# Patient Record
Sex: MALE | Race: WHITE | NOT HISPANIC OR LATINO | Employment: FULL TIME | URBAN - METROPOLITAN AREA
[De-identification: names, ages, dates, MRNs, and addresses within clinical notes are randomized per-mention and may not be internally consistent; named-entity substitution may affect disease eponyms.]

---

## 2018-04-05 ENCOUNTER — OFFICE VISIT (OUTPATIENT)
Dept: PRIMARY CARE CLINIC | Facility: CLINIC | Age: 38
End: 2018-04-05
Payer: COMMERCIAL

## 2018-04-05 VITALS
RESPIRATION RATE: 12 BRPM | HEIGHT: 72 IN | OXYGEN SATURATION: 100 % | DIASTOLIC BLOOD PRESSURE: 65 MMHG | HEART RATE: 80 BPM | WEIGHT: 172.75 LBS | TEMPERATURE: 98 F | SYSTOLIC BLOOD PRESSURE: 113 MMHG | BODY MASS INDEX: 23.4 KG/M2

## 2018-04-05 DIAGNOSIS — M25.50 ARTHRALGIA OF MULTIPLE JOINTS: ICD-10-CM

## 2018-04-05 DIAGNOSIS — Z00.00 PHYSICAL EXAM, ANNUAL: Primary | ICD-10-CM

## 2018-04-05 PROCEDURE — 99999 PR PBB SHADOW E&M-NEW PATIENT-LVL III: CPT | Mod: PBBFAC,,, | Performed by: NURSE PRACTITIONER

## 2018-04-05 PROCEDURE — 99385 PREV VISIT NEW AGE 18-39: CPT | Mod: SA,S$GLB,, | Performed by: NURSE PRACTITIONER

## 2018-04-05 RX ORDER — MELOXICAM 7.5 MG/1
TABLET ORAL
Qty: 60 TABLET | Refills: 2 | Status: SHIPPED | OUTPATIENT
Start: 2018-04-05 | End: 2019-09-18

## 2018-04-05 RX ORDER — MELOXICAM 15 MG/1
15 TABLET ORAL 2 TIMES DAILY
COMMUNITY
End: 2018-04-05

## 2018-04-05 NOTE — PROGRESS NOTES
Subjective:       Patient ID: Storm Hinds is a 38 y.o. male.    Chief Complaint: Establish Care    Patient presents to clinic today requesting to re-establish as a primary care patient.  He is well known to this provider and was previously followed as a primary care patient.  He has a known history of intermittent joint pain (knees, elbows, wrists and ankles after going up stairs/ladders multiple times at work)  and has been using Mobic on an as needed basis.  He is an  and works out of town in [place] but lives in [place] with his wife and children. He has no acute complaints today.     Diet/Exercise: He does not follow any specific diet or exercise plan  Eye: due for exam. Last one was 1 year ago.   Dental: He is followed by Dr. Aleena Tan for his dental care and does attend routine exams.    Immunizations: Up to date  Labs/Annual Exam: Due for labs and exam will be completed today.           Review of Systems   Constitutional: Positive for fatigue (experiences intermittent fatigue and daytime sleepiness when working off hour shifts. ). Negative for appetite change, chills, diaphoresis, fever and unexpected weight change.   HENT: Negative.  Negative for congestion.    Eyes: Negative.    Respiratory: Negative.    Cardiovascular: Negative.    Gastrointestinal: Negative.    Endocrine: Negative.    Genitourinary: Negative.    Musculoskeletal: Positive for arthralgias. Negative for back pain, gait problem, joint swelling, myalgias and neck pain.   Skin: Negative.    Allergic/Immunologic: Negative.    Neurological: Negative.    Hematological: Negative.    Psychiatric/Behavioral: Negative.    All other systems reviewed and are negative.      Objective:      Physical Exam   Constitutional: He is oriented to person, place, and time. He appears well-developed and well-nourished. No distress.   HENT:   Head: Normocephalic and atraumatic.   Right Ear: External ear normal.   Left Ear: External ear normal.   Nose:  Nose normal.   Mouth/Throat: Oropharynx is clear and moist. No oropharyngeal exudate.   Eyes: Conjunctivae and EOM are normal. Pupils are equal, round, and reactive to light. Right eye exhibits no discharge. Left eye exhibits no discharge. No scleral icterus.   Neck: Normal range of motion. Neck supple. No tracheal deviation present. No thyromegaly present.   Cardiovascular: Normal rate, regular rhythm, normal heart sounds and intact distal pulses.  Exam reveals no gallop and no friction rub.    No murmur heard.  Pulmonary/Chest: Effort normal and breath sounds normal. No respiratory distress.   Abdominal: Soft. Bowel sounds are normal. He exhibits no distension. There is no tenderness.   Musculoskeletal: Normal range of motion. He exhibits no edema, tenderness or deformity.   Lymphadenopathy:     He has no cervical adenopathy.   Neurological: He is alert and oriented to person, place, and time. He displays normal reflexes. No cranial nerve deficit or sensory deficit. Coordination normal.   Skin: Skin is warm and dry. Capillary refill takes less than 2 seconds. No rash noted. He is not diaphoretic. No erythema. No pallor.   Psychiatric: He has a normal mood and affect. His behavior is normal. Judgment and thought content normal.   Nursing note and vitals reviewed.      Assessment:       1. Physical exam, annual    2. Arthralgia of multiple joints        Plan:       Physical exam, annual  -     Lipid panel; Future; Expected date: 04/05/2018  -     CBC auto differential; Future; Expected date: 04/05/2018  -     Comprehensive metabolic panel; Future; Expected date: 04/05/2018  -     URINALYSIS; Future; Expected date: 04/05/201    Overall doing well.  Will undergo labs within the next week.  We will call with any acutely abnormal results.   Arthralgia of multiple joints  -     meloxicam (MOBIC) 7.5 MG tablet; Take 1 or 2 tablets daily by mouth as needed for joint pain.  Dispense: 60 tablet; Refill: 2  -      Sedimentation rate, manual; Future; Expected date: 04/05/2018  -     Rheumatoid factor; Future; Expected date: 04/05/2018  Will refill rx for as needed use.  Cautioned patient on risks associated with NSAID use including CV and renal risks and instructed patient to not take any other NSAID while taking Mobic.  He was encouraged to rotate medication such as acetaminophen arthritis in lieu of daily Mobic.  Will check SED rate and RF with labs.  Take RX with food to avoid GI upset/risk for bleeding.      I have reviewed the patient's past medical/surgical and social histories and updated as appropriate. Medications were reviewed and discussed as appropriate including side effects and risks versus benefit.     Plan of care was reviewed and agreed upon with the patient.  An opportunity to ask questions was provided and explanation given. Patient verbalized understanding on all information reviewed and discussed.  The patient will follow up 6 months or sooner. If symptoms worsen patient may call for ASAP appointment or report to the emergency department for further evaluation.

## 2018-04-05 NOTE — PATIENT INSTRUCTIONS
Erythrocyte Sedimentation Rate  Does this test have other names?  ESR, sed rate  What is this test?  Erythrocyte sedimentation rate (ESR) is a blood test. It measures how quickly erythrocytes, or red blood cells, separate from a blood sample that has been treated so the blood will not clot. During this test, a small amount of your blood will be put in an upright tube. A  will measure the rate that your red blood cells settle toward the bottom of the tube after 1 hour.  If you have a condition that causes inflammation or cell damage, your red blood cells tend to clump together. This makes them heavier, so they settle faster. The faster your red blood cells settle and fall, the higher your ESR. A high ESR tells your healthcare provider that you may have an active disease process in your body.   Why do I need this test?  You may need this blood test if you have symptoms of one of the diseases that may cause ESR to go up.  You may also need this test if you have already been diagnosed with a disease that causes a high ESR. The test can allow your healthcare provider to see how well you are responding to treatment.  The ESR blood test is most useful for diagnosing or monitoring diseases that cause pain and swelling from inflammation. Other symptoms may include fever and weight loss. These diseases include:  · Temporal arteritis  · Rheumatoid arthritis  · Polymyalgia rheumatica  ESR is not used as a screening test in people who do not have symptoms or to diagnose disease because many conditions can cause it to increase. It might also go up in many normal cases. ESR doesn't tell your healthcare provider whether you have a specific disease. It only suggests that you may have an active disease process in your body.   What other tests might I have along with this test?  You may have other tests if your healthcare provider is doing this test to diagnose a disease.  Your provider may do an ESR alone if he or she  is monitoring a disease you already have.  Because ESR tells your provider only what is happening right now, you may need to have the test repeated over time.   What do my test results mean?  A result for a lab test may be affected by many things, including the method the laboratory uses to do the test. If your test results are different from the normal value, you may not have a problem. To learn what the results mean for you, talk with your healthcare provider.  ESR is measured in millimeters per hour (mm/h). The normal values are:  · 0 to 10 mm/h in children  · 0 to 15 mm/h in men younger than 50  · 0 to 20 mm/h in men older than 50  · 0 to 20 mm/h in women younger than 50  · 0 to 30 mm/h in women older than 50  ESR above 100 mm/h is most likely caused by an active disease. For instance, you may have:  · A disease that causes inflammation in your body  · Active infection  · Cancer  · Heart disease  · Kidney disease  · Blood disease  · Diabetes  · Collagen vascular disease  How is this test done?  The test requires a blood sample, which is drawn through a needle from a vein in your arm.  Does this test pose any risks?  Taking a blood sample with a needle carries risks that include bleeding, infection, bruising, or feeling dizzy. When the needle pricks your arm, you may feel a slight stinging sensation or pain. Afterward, the site may be slightly sore.   What might affect my test results?  Many things that are not active diseases can increase your ESR. These include:  · Pregnancy  · Old age  · Being female  · Having a menstrual period  · Having recently eaten a fatty meal  · Being obese  · Taking certain medicines  How do I get ready for this test?  You don't need to prepare for this test. Be sure your healthcare provider knows about all medicines, herbs, vitamins, and supplements you are taking. This includes medicines that don't need a prescription and any illicit drugs you may use. Tell your provider if you ate a  fatty meal recently, if you are having your period, or if you may be pregnant.   © 9680-9239 [employer]. [address] [place] [address], [place] [address]. All rights reserved. This information is not intended as a substitute for professional medical care. Always follow your healthcare professional's instructions.

## 2018-04-07 ENCOUNTER — LAB VISIT (OUTPATIENT)
Dept: LAB | Facility: HOSPITAL | Age: 38
End: 2018-04-07
Attending: NURSE PRACTITIONER
Payer: COMMERCIAL

## 2018-04-07 DIAGNOSIS — Z00.00 PHYSICAL EXAM, ANNUAL: ICD-10-CM

## 2018-04-07 DIAGNOSIS — M25.50 ARTHRALGIA OF MULTIPLE JOINTS: ICD-10-CM

## 2018-04-07 LAB
ALBUMIN SERPL BCP-MCNC: 4.4 G/DL
ALP SERPL-CCNC: 55 U/L
ALT SERPL W/O P-5'-P-CCNC: 71 U/L
ANION GAP SERPL CALC-SCNC: 7 MMOL/L
AST SERPL-CCNC: 43 U/L
BASOPHILS # BLD AUTO: 0.02 K/UL
BASOPHILS NFR BLD: 0.3 %
BILIRUB SERPL-MCNC: 0.7 MG/DL
BUN SERPL-MCNC: 17 MG/DL
CALCIUM SERPL-MCNC: 9.7 MG/DL
CHLORIDE SERPL-SCNC: 103 MMOL/L
CHOLEST SERPL-MCNC: 247 MG/DL
CHOLEST/HDLC SERPL: 4 {RATIO}
CO2 SERPL-SCNC: 29 MMOL/L
CREAT SERPL-MCNC: 0.7 MG/DL
DIFFERENTIAL METHOD: ABNORMAL
EOSINOPHIL # BLD AUTO: 0.3 K/UL
EOSINOPHIL NFR BLD: 4.2 %
ERYTHROCYTE [DISTWIDTH] IN BLOOD BY AUTOMATED COUNT: 13.3 %
ERYTHROCYTE [SEDIMENTATION RATE] IN BLOOD BY WESTERGREN METHOD: 1 MM/HR
EST. GFR  (AFRICAN AMERICAN): >60 ML/MIN/1.73 M^2
EST. GFR  (NON AFRICAN AMERICAN): >60 ML/MIN/1.73 M^2
GLUCOSE SERPL-MCNC: 86 MG/DL
HCT VFR BLD AUTO: 40.8 %
HDLC SERPL-MCNC: 61 MG/DL
HDLC SERPL: 24.7 %
HGB BLD-MCNC: 13.7 G/DL
IMM GRANULOCYTES # BLD AUTO: 0.03 K/UL
IMM GRANULOCYTES NFR BLD AUTO: 0.5 %
LDLC SERPL CALC-MCNC: 152.4 MG/DL
LYMPHOCYTES # BLD AUTO: 2.4 K/UL
LYMPHOCYTES NFR BLD: 35.6 %
MCH RBC QN AUTO: 29.7 PG
MCHC RBC AUTO-ENTMCNC: 33.6 G/DL
MCV RBC AUTO: 88 FL
MONOCYTES # BLD AUTO: 0.6 K/UL
MONOCYTES NFR BLD: 8.6 %
NEUTROPHILS # BLD AUTO: 3.4 K/UL
NEUTROPHILS NFR BLD: 50.8 %
NONHDLC SERPL-MCNC: 186 MG/DL
NRBC BLD-RTO: 0 /100 WBC
PLATELET # BLD AUTO: 268 K/UL
PMV BLD AUTO: 9.7 FL
POTASSIUM SERPL-SCNC: 4.1 MMOL/L
PROT SERPL-MCNC: 7.5 G/DL
RBC # BLD AUTO: 4.62 M/UL
RHEUMATOID FACT SERPL-ACNC: <10 IU/ML
SODIUM SERPL-SCNC: 139 MMOL/L
TRIGL SERPL-MCNC: 168 MG/DL
WBC # BLD AUTO: 6.61 K/UL

## 2018-04-07 PROCEDURE — 86431 RHEUMATOID FACTOR QUANT: CPT

## 2018-04-07 PROCEDURE — 80061 LIPID PANEL: CPT

## 2018-04-07 PROCEDURE — 85651 RBC SED RATE NONAUTOMATED: CPT

## 2018-04-07 PROCEDURE — 36415 COLL VENOUS BLD VENIPUNCTURE: CPT

## 2018-04-07 PROCEDURE — 80053 COMPREHEN METABOLIC PANEL: CPT

## 2018-04-07 PROCEDURE — 85025 COMPLETE CBC W/AUTO DIFF WBC: CPT

## 2018-04-09 ENCOUNTER — PATIENT MESSAGE (OUTPATIENT)
Dept: PRIMARY CARE CLINIC | Facility: CLINIC | Age: 38
End: 2018-04-09

## 2018-04-09 DIAGNOSIS — E78.2 ELEVATED CHOLESTEROL WITH ELEVATED TRIGLYCERIDES: ICD-10-CM

## 2018-04-09 DIAGNOSIS — M25.50 ARTHRALGIA OF MULTIPLE JOINTS: Primary | ICD-10-CM

## 2018-04-09 DIAGNOSIS — R74.8 ELEVATED LIVER ENZYMES: ICD-10-CM

## 2018-04-09 RX ORDER — DICLOFENAC SODIUM 10 MG/G
2 GEL TOPICAL 3 TIMES DAILY PRN
Qty: 100 G | Refills: 1 | Status: SHIPPED | OUTPATIENT
Start: 2018-04-09 | End: 2018-04-19

## 2019-09-18 ENCOUNTER — HOSPITAL ENCOUNTER (OUTPATIENT)
Dept: RADIOLOGY | Facility: HOSPITAL | Age: 39
Discharge: HOME | End: 2019-09-18
Attending: FAMILY MEDICINE
Payer: COMMERCIAL

## 2019-09-18 ENCOUNTER — OFFICE VISIT (OUTPATIENT)
Dept: FAMILY MEDICINE | Facility: CLINIC | Age: 39
End: 2019-09-18
Payer: COMMERCIAL

## 2019-09-18 ENCOUNTER — HOSPITAL ENCOUNTER (OUTPATIENT)
Dept: CARDIOLOGY | Facility: HOSPITAL | Age: 39
Discharge: HOME | End: 2019-09-18
Attending: FAMILY MEDICINE
Payer: COMMERCIAL

## 2019-09-18 VITALS
OXYGEN SATURATION: 97 % | HEART RATE: 68 BPM | TEMPERATURE: 98 F | HEIGHT: 72 IN | RESPIRATION RATE: 16 BRPM | BODY MASS INDEX: 26.79 KG/M2 | DIASTOLIC BLOOD PRESSURE: 68 MMHG | WEIGHT: 197.81 LBS | SYSTOLIC BLOOD PRESSURE: 112 MMHG

## 2019-09-18 DIAGNOSIS — M54.50 ACUTE MIDLINE LOW BACK PAIN WITHOUT SCIATICA: ICD-10-CM

## 2019-09-18 DIAGNOSIS — R07.89 CHEST PRESSURE: ICD-10-CM

## 2019-09-18 DIAGNOSIS — Z76.89 ENCOUNTER TO ESTABLISH CARE: Primary | ICD-10-CM

## 2019-09-18 DIAGNOSIS — M62.838 MUSCLE SPASM: ICD-10-CM

## 2019-09-18 DIAGNOSIS — M54.6 ACUTE MIDLINE THORACIC BACK PAIN: ICD-10-CM

## 2019-09-18 DIAGNOSIS — M54.2 NECK PAIN: ICD-10-CM

## 2019-09-18 DIAGNOSIS — K21.9 GASTROESOPHAGEAL REFLUX DISEASE, ESOPHAGITIS PRESENCE NOT SPECIFIED: ICD-10-CM

## 2019-09-18 PROCEDURE — 72100 XR LUMBAR SPINE AP AND LATERAL: ICD-10-PCS | Mod: 26,,, | Performed by: RADIOLOGY

## 2019-09-18 PROCEDURE — 93005 ELECTROCARDIOGRAM TRACING: CPT

## 2019-09-18 PROCEDURE — 3008F BODY MASS INDEX DOCD: CPT | Mod: CPTII,S$GLB,, | Performed by: FAMILY MEDICINE

## 2019-09-18 PROCEDURE — 72100 X-RAY EXAM L-S SPINE 2/3 VWS: CPT | Mod: TC,FY

## 2019-09-18 PROCEDURE — 72040 XR CERVICAL SPINE AP LATERAL: ICD-10-PCS | Mod: 26,,, | Performed by: RADIOLOGY

## 2019-09-18 PROCEDURE — 72040 X-RAY EXAM NECK SPINE 2-3 VW: CPT | Mod: TC,FY

## 2019-09-18 PROCEDURE — 72070 X-RAY EXAM THORAC SPINE 2VWS: CPT | Mod: 26,,, | Performed by: RADIOLOGY

## 2019-09-18 PROCEDURE — 3008F PR BODY MASS INDEX (BMI) DOCUMENTED: ICD-10-PCS | Mod: CPTII,S$GLB,, | Performed by: FAMILY MEDICINE

## 2019-09-18 PROCEDURE — 99214 OFFICE O/P EST MOD 30 MIN: CPT | Mod: S$GLB,,, | Performed by: FAMILY MEDICINE

## 2019-09-18 PROCEDURE — 72070 X-RAY EXAM THORAC SPINE 2VWS: CPT | Mod: TC,FY

## 2019-09-18 PROCEDURE — 99214 PR OFFICE/OUTPT VISIT, EST, LEVL IV, 30-39 MIN: ICD-10-PCS | Mod: S$GLB,,, | Performed by: FAMILY MEDICINE

## 2019-09-18 PROCEDURE — 72040 X-RAY EXAM NECK SPINE 2-3 VW: CPT | Mod: 26,,, | Performed by: RADIOLOGY

## 2019-09-18 PROCEDURE — 72100 X-RAY EXAM L-S SPINE 2/3 VWS: CPT | Mod: 26,,, | Performed by: RADIOLOGY

## 2019-09-18 PROCEDURE — 72070 XR THORACIC SPINE AP LATERAL: ICD-10-PCS | Mod: 26,,, | Performed by: RADIOLOGY

## 2019-09-18 RX ORDER — DIAZEPAM 5 MG/1
5 TABLET ORAL DAILY PRN
Qty: 5 TABLET | Refills: 0 | Status: SHIPPED | OUTPATIENT
Start: 2019-09-18 | End: 2019-09-26 | Stop reason: SDUPTHER

## 2019-09-18 RX ORDER — METHOCARBAMOL 750 MG/1
TABLET, FILM COATED ORAL
COMMUNITY
Start: 2019-09-17 | End: 2019-09-18 | Stop reason: ALTCHOICE

## 2019-09-18 RX ORDER — HYDROCODONE BITARTRATE AND ACETAMINOPHEN 7.5; 325 MG/1; MG/1
TABLET ORAL
COMMUNITY
Start: 2019-09-17 | End: 2019-09-18 | Stop reason: ALTCHOICE

## 2019-09-18 RX ORDER — CYCLOBENZAPRINE HCL 10 MG
10 TABLET ORAL 2 TIMES DAILY PRN
Qty: 20 TABLET | Refills: 0 | Status: SHIPPED | OUTPATIENT
Start: 2019-09-18 | End: 2019-09-28
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
## 2019-09-18 NOTE — LETTER
September 18, 2019      [facility]  [address] [place] [id number]  Phone: [phone]  Fax: [phone]       Patient: Storm Hinds   YOB: 1980  Date of Visit: 09/18/2019    To Whom It May Concern:    Storm Hinds  was at [facility] on 09/18/2019. He may return to work/school on 9/23/19 with no restrictions. If you have any questions or concerns, or if I can be of further assistance, please do not hesitate to contact me.    Sincerely,    Katie Wasserman MD

## 2019-09-19 ENCOUNTER — TELEPHONE (OUTPATIENT)
Dept: FAMILY MEDICINE | Facility: CLINIC | Age: 39
End: 2019-09-19

## 2019-09-19 NOTE — TELEPHONE ENCOUNTER
----- Message from Jethro Stark sent at 9/19/2019  8:37 AM CDT -----  Contact: Sarah Griffith [phone]  Type: Needs Medical Advice    Who Called: Sarah Griffith [phone]    Symptoms (please be specific):  Still has pain.    How long has patient had these symptoms:  Since last visit    Medication:cyclobenzaprine (FLEXERIL) 10 MG tablet 20 tablet 0 9/18/2019 9/28/2019   Sig - Route: Take 1 tablet (10 mg total) by mouth 2 (two) times daily as needed for Muscle spasms. - Oral   Sent to pharmacy as: cyclobenzaprine (FLEXERIL) 10 MG tablet     Pharmacy name and phone #:      [address] [address] [place] [id number] [address]  Phone: [phone] Fax: [phone]    Best Call Back Number: Sarah Griffith [phone]    Additional Information: Advised still has pain taking this medication. Would like to know what else he could do for his condition regarding his pain. Please call.

## 2019-09-19 NOTE — TELEPHONE ENCOUNTER
CALL PT. THIS AFTERNOON NO ANSWER. WILL TRY LATER PT WOULD LIKE SOMETHING ELSE FOR PAIN THAN WHAT HE IS TAKING. PLEASE ADVISE.    THANKS.

## 2019-09-20 ENCOUNTER — TELEPHONE (OUTPATIENT)
Dept: FAMILY MEDICINE | Facility: CLINIC | Age: 39
End: 2019-09-20

## 2019-09-20 NOTE — TELEPHONE ENCOUNTER
----- Message from Aleena Espinosa sent at 9/20/2019  9:51 AM CDT -----  Contact: pt's wife  [phone]  Patient's wife called back to the office she just missed a call from the nurse will you be able to call back before 12:15 then after 2:15 today.

## 2019-09-20 NOTE — TELEPHONE ENCOUNTER
PT. WANT TO KNOW IF HE COULD TAKE THE VALIUM AND FLEXERIL TOGETHER. VALIUM ORDERED FOR BREAK THROUGH PAIN AS PRESCRIBED. WIFE STATES SHE WILL LET  KNOW AND SHE IS OK WITH THE ORDER.+

## 2019-09-23 ENCOUNTER — OFFICE VISIT (OUTPATIENT)
Dept: FAMILY MEDICINE | Facility: CLINIC | Age: 39
End: 2019-09-23
Payer: COMMERCIAL

## 2019-09-23 ENCOUNTER — HOSPITAL ENCOUNTER (OUTPATIENT)
Dept: RADIOLOGY | Facility: HOSPITAL | Age: 39
Discharge: HOME | End: 2019-09-23
Attending: FAMILY MEDICINE
Payer: COMMERCIAL

## 2019-09-23 ENCOUNTER — PATIENT MESSAGE (OUTPATIENT)
Dept: FAMILY MEDICINE | Facility: CLINIC | Age: 39
End: 2019-09-23

## 2019-09-23 ENCOUNTER — TELEPHONE (OUTPATIENT)
Dept: FAMILY MEDICINE | Facility: CLINIC | Age: 39
End: 2019-09-23

## 2019-09-23 VITALS
BODY MASS INDEX: 26.95 KG/M2 | RESPIRATION RATE: 18 BRPM | DIASTOLIC BLOOD PRESSURE: 83 MMHG | SYSTOLIC BLOOD PRESSURE: 130 MMHG | TEMPERATURE: 98 F | OXYGEN SATURATION: 96 % | HEIGHT: 72 IN | WEIGHT: 199 LBS | HEART RATE: 110 BPM

## 2019-09-23 DIAGNOSIS — R93.7 ABNORMAL X-RAY OF LUMBAR SPINE: ICD-10-CM

## 2019-09-23 DIAGNOSIS — R29.898 RIGHT LEG WEAKNESS: ICD-10-CM

## 2019-09-23 DIAGNOSIS — R20.0 RIGHT LEG NUMBNESS: ICD-10-CM

## 2019-09-23 DIAGNOSIS — M54.2 NECK PAIN: ICD-10-CM

## 2019-09-23 DIAGNOSIS — R29.898 RIGHT LEG WEAKNESS: Primary | ICD-10-CM

## 2019-09-23 DIAGNOSIS — R20.0 LEFT ARM NUMBNESS: ICD-10-CM

## 2019-09-23 DIAGNOSIS — M48.00 NEURAL FORAMINAL STENOSIS, MULTILEVEL: Primary | ICD-10-CM

## 2019-09-23 DIAGNOSIS — M54.41 ACUTE MIDLINE LOW BACK PAIN WITH RIGHT-SIDED SCIATICA: ICD-10-CM

## 2019-09-23 PROCEDURE — 72148 MRI LUMBAR SPINE WITHOUT CONTRAST: ICD-10-PCS | Mod: 26,,, | Performed by: RADIOLOGY

## 2019-09-23 PROCEDURE — 72148 MRI LUMBAR SPINE W/O DYE: CPT | Mod: 26,,, | Performed by: RADIOLOGY

## 2019-09-23 PROCEDURE — 96372 THER/PROPH/DIAG INJ SC/IM: CPT | Mod: S$GLB,,, | Performed by: FAMILY MEDICINE

## 2019-09-23 PROCEDURE — 99214 OFFICE O/P EST MOD 30 MIN: CPT | Mod: 25,S$GLB,, | Performed by: FAMILY MEDICINE

## 2019-09-23 PROCEDURE — 96372 PR INJECTION,THERAP/PROPH/DIAG2ST, IM OR SUBCUT: ICD-10-PCS | Mod: S$GLB,,, | Performed by: FAMILY MEDICINE

## 2019-09-23 PROCEDURE — 99214 PR OFFICE/OUTPT VISIT, EST, LEVL IV, 30-39 MIN: ICD-10-PCS | Mod: 25,S$GLB,, | Performed by: FAMILY MEDICINE

## 2019-09-23 PROCEDURE — 72141 MRI CERVICAL SPINE WITHOUT CONTRAST: ICD-10-PCS | Mod: 26,,, | Performed by: RADIOLOGY

## 2019-09-23 PROCEDURE — 3008F PR BODY MASS INDEX (BMI) DOCUMENTED: ICD-10-PCS | Mod: CPTII,S$GLB,, | Performed by: FAMILY MEDICINE

## 2019-09-23 PROCEDURE — 72148 MRI LUMBAR SPINE W/O DYE: CPT | Mod: TC

## 2019-09-23 PROCEDURE — 72141 MRI NECK SPINE W/O DYE: CPT | Mod: TC

## 2019-09-23 PROCEDURE — 3008F BODY MASS INDEX DOCD: CPT | Mod: CPTII,S$GLB,, | Performed by: FAMILY MEDICINE

## 2019-09-23 PROCEDURE — 72141 MRI NECK SPINE W/O DYE: CPT | Mod: 26,,, | Performed by: RADIOLOGY

## 2019-09-23 RX ORDER — GABAPENTIN 300 MG/1
300 CAPSULE ORAL NIGHTLY
Qty: 30 CAPSULE | Refills: 0 | Status: SHIPPED | OUTPATIENT
Start: 2019-09-23 | End: 2019-10-27 | Stop reason: SDUPTHER

## 2019-09-23 RX ORDER — NAPROXEN 500 MG/1
500 TABLET ORAL 2 TIMES DAILY WITH MEALS
Qty: 60 TABLET | Refills: 0 | Status: SHIPPED | OUTPATIENT
Start: 2019-09-23 | End: 2019-09-30

## 2019-09-23 RX ORDER — DEXAMETHASONE SODIUM PHOSPHATE 4 MG/ML
4 INJECTION, SOLUTION INTRA-ARTICULAR; INTRALESIONAL; INTRAMUSCULAR; INTRAVENOUS; SOFT TISSUE
Status: COMPLETED | OUTPATIENT
Start: 2019-09-23 | End: 2019-09-23

## 2019-09-23 RX ORDER — PREDNISONE 20 MG/1
20 TABLET ORAL DAILY
Qty: 5 TABLET | Refills: 0 | Status: SHIPPED | OUTPATIENT
Start: 2019-09-23 | End: 2019-09-28

## 2019-09-23 RX ADMIN — DEXAMETHASONE SODIUM PHOSPHATE 4 MG: 4 INJECTION, SOLUTION INTRA-ARTICULAR; INTRALESIONAL; INTRAMUSCULAR; INTRAVENOUS; SOFT TISSUE at 10:09
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
## 2019-09-24 ENCOUNTER — PATIENT MESSAGE (OUTPATIENT)
Dept: FAMILY MEDICINE | Facility: CLINIC | Age: 39
End: 2019-09-24

## 2019-09-26 ENCOUNTER — PATIENT MESSAGE (OUTPATIENT)
Dept: FAMILY MEDICINE | Facility: CLINIC | Age: 39
End: 2019-09-26

## 2019-09-26 ENCOUNTER — PATIENT MESSAGE (OUTPATIENT)
Dept: CARDIOLOGY | Facility: CLINIC | Age: 39
End: 2019-09-26

## 2019-09-26 DIAGNOSIS — M62.838 MUSCLE SPASM: ICD-10-CM

## 2019-09-26 RX ORDER — DIAZEPAM 5 MG/1
5 TABLET ORAL DAILY PRN
Qty: 5 TABLET | Refills: 0 | Status: SHIPPED | OUTPATIENT
Start: 2019-09-26 | End: 2019-10-21

## 2019-09-26 NOTE — TELEPHONE ENCOUNTER
This is the patient that wants short term paperwork. Let me know if you need me to do anything else. Thank you

## 2019-09-26 NOTE — TELEPHONE ENCOUNTER
Patient stated the medication was not sent to pharmacy. I looked and the medication is there but no confirmation noted pramod tmed  was sent. Please advise. Thank you

## 2019-09-27 RX ORDER — DIAZEPAM 5 MG/1
TABLET ORAL
Qty: 5 TABLET | Refills: 0 | OUTPATIENT
Start: 2019-09-27

## 2019-10-02 ENCOUNTER — OFFICE VISIT (OUTPATIENT)
Dept: NEUROSURGERY | Facility: CLINIC | Age: 39
End: 2019-10-02
Payer: COMMERCIAL

## 2019-10-02 ENCOUNTER — HOSPITAL ENCOUNTER (OUTPATIENT)
Dept: RADIOLOGY | Facility: HOSPITAL | Age: 39
Discharge: HOME | End: 2019-10-02
Attending: PHYSICIAN ASSISTANT
Payer: COMMERCIAL

## 2019-10-02 VITALS
BODY MASS INDEX: 27.55 KG/M2 | WEIGHT: 203.38 LBS | HEART RATE: 116 BPM | HEIGHT: 72 IN | DIASTOLIC BLOOD PRESSURE: 75 MMHG | SYSTOLIC BLOOD PRESSURE: 132 MMHG

## 2019-10-02 DIAGNOSIS — R29.818 FOCAL NEUROLOGICAL DEFICIT: ICD-10-CM

## 2019-10-02 DIAGNOSIS — R51.9 CHRONIC NONINTRACTABLE HEADACHE, UNSPECIFIED HEADACHE TYPE: ICD-10-CM

## 2019-10-02 DIAGNOSIS — G95.9 MYELOPATHY: Primary | ICD-10-CM

## 2019-10-02 DIAGNOSIS — M54.50 LUMBAR BACK PAIN: ICD-10-CM

## 2019-10-02 DIAGNOSIS — G95.9 MYELOPATHY: ICD-10-CM

## 2019-10-02 DIAGNOSIS — G89.29 CHRONIC NONINTRACTABLE HEADACHE, UNSPECIFIED HEADACHE TYPE: ICD-10-CM

## 2019-10-02 DIAGNOSIS — G44.89 CHRONIC MIXED HEADACHE SYNDROME: ICD-10-CM

## 2019-10-02 PROCEDURE — 99204 OFFICE O/P NEW MOD 45 MIN: CPT | Mod: S$GLB,,, | Performed by: PHYSICIAN ASSISTANT

## 2019-10-02 PROCEDURE — 99999 PR PBB SHADOW E&M-EST. PATIENT-LVL III: ICD-10-PCS | Mod: PBBFAC,,, | Performed by: PHYSICIAN ASSISTANT

## 2019-10-02 PROCEDURE — 3008F BODY MASS INDEX DOCD: CPT | Mod: CPTII,S$GLB,, | Performed by: PHYSICIAN ASSISTANT

## 2019-10-02 PROCEDURE — 70553 MRI BRAIN STEM W/O & W/DYE: CPT | Mod: TC

## 2019-10-02 PROCEDURE — 99999 PR PBB SHADOW E&M-EST. PATIENT-LVL III: CPT | Mod: PBBFAC,,, | Performed by: PHYSICIAN ASSISTANT

## 2019-10-02 PROCEDURE — 72146 MRI CHEST SPINE W/O DYE: CPT | Mod: TC

## 2019-10-02 PROCEDURE — A9585 GADOBUTROL INJECTION: HCPCS | Performed by: PHYSICIAN ASSISTANT

## 2019-10-02 PROCEDURE — 3008F PR BODY MASS INDEX (BMI) DOCUMENTED: ICD-10-PCS | Mod: CPTII,S$GLB,, | Performed by: PHYSICIAN ASSISTANT

## 2019-10-02 PROCEDURE — 25500020 PHARM REV CODE 255: Performed by: PHYSICIAN ASSISTANT

## 2019-10-02 PROCEDURE — 99204 PR OFFICE/OUTPT VISIT, NEW, LEVL IV, 45-59 MIN: ICD-10-PCS | Mod: S$GLB,,, | Performed by: PHYSICIAN ASSISTANT

## 2019-10-02 RX ORDER — GADOBUTROL 604.72 MG/ML
9 INJECTION INTRAVENOUS
Status: COMPLETED | OUTPATIENT
Start: 2019-10-02 | End: 2019-10-02

## 2019-10-02 RX ADMIN — GADOBUTROL 9 ML: 604.72 INJECTION INTRAVENOUS at 05:10

## 2019-10-02 NOTE — LETTER
October 2, 2019      Katie Wasserman MD  [address] [facility] [place] [address]           [place] [facility] Neurosurgery  [address] [place] [address]  Phone: [phone]  Fax: [phone]          Patient: Storm Hinds   MR Number: [id number]   YOB: 1980   Date of Visit: 10/2/2019       Dear Dr. Katie Wasserman:    Thank you for referring Storm Hinds to me for evaluation. Attached you will find relevant portions of my assessment and plan of care.    If you have questions, please do not hesitate to call me. I look forward to following Storm Hinds along with you.    Sincerely,    LIZ Pruitt  CC:  No Recipients    If you would like to receive this communication electronically, please contact [email or web address] or [phone] to request more information on [employer] access.    For providers and/or their staff who would like to refer a patient to [facility], please contact us through our one-stop-shop provider referral line, [facility], at [phone].    If you feel you have received this communication in error or would no longer like to receive these types of communications, please e-mail [email or web address]
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
## 2019-10-03 RX ORDER — TIZANIDINE 4 MG/1
4 TABLET ORAL EVERY 6 HOURS PRN
Qty: 40 TABLET | Refills: 1 | Status: SHIPPED | OUTPATIENT
Start: 2019-10-03 | End: 2019-10-21 | Stop reason: SDUPTHER

## 2019-10-03 RX ORDER — METHYLPREDNISOLONE 4 MG/1
TABLET ORAL
Qty: 1 PACKAGE | Refills: 0 | Status: SHIPPED | OUTPATIENT
Start: 2019-10-03 | End: 2019-10-21

## 2019-10-07 ENCOUNTER — LAB VISIT (OUTPATIENT)
Dept: LAB | Facility: HOSPITAL | Age: 39
End: 2019-10-07
Attending: FAMILY MEDICINE
Payer: COMMERCIAL

## 2019-10-07 ENCOUNTER — OFFICE VISIT (OUTPATIENT)
Dept: FAMILY MEDICINE | Facility: CLINIC | Age: 39
End: 2019-10-07
Payer: COMMERCIAL

## 2019-10-07 VITALS
HEIGHT: 72 IN | DIASTOLIC BLOOD PRESSURE: 84 MMHG | OXYGEN SATURATION: 97 % | RESPIRATION RATE: 16 BRPM | SYSTOLIC BLOOD PRESSURE: 129 MMHG | BODY MASS INDEX: 27.58 KG/M2 | WEIGHT: 203.63 LBS | HEART RATE: 110 BPM

## 2019-10-07 DIAGNOSIS — R29.818 FOCAL NEUROLOGICAL DEFICIT: ICD-10-CM

## 2019-10-07 DIAGNOSIS — M62.838 MUSCLE SPASM: ICD-10-CM

## 2019-10-07 DIAGNOSIS — M62.838 MUSCLE SPASM: Primary | ICD-10-CM

## 2019-10-07 PROCEDURE — 3008F BODY MASS INDEX DOCD: CPT | Mod: CPTII,S$GLB,, | Performed by: FAMILY MEDICINE

## 2019-10-07 PROCEDURE — 99214 PR OFFICE/OUTPT VISIT, EST, LEVL IV, 30-39 MIN: ICD-10-PCS | Mod: S$GLB,,, | Performed by: FAMILY MEDICINE

## 2019-10-07 PROCEDURE — 3008F PR BODY MASS INDEX (BMI) DOCUMENTED: ICD-10-PCS | Mod: CPTII,S$GLB,, | Performed by: FAMILY MEDICINE

## 2019-10-07 PROCEDURE — 36415 COLL VENOUS BLD VENIPUNCTURE: CPT

## 2019-10-07 PROCEDURE — 99214 OFFICE O/P EST MOD 30 MIN: CPT | Mod: S$GLB,,, | Performed by: FAMILY MEDICINE

## 2019-10-07 PROCEDURE — 86341 ISLET CELL ANTIBODY: CPT

## 2019-10-07 RX ORDER — BACLOFEN 5 MG/1
5 TABLET ORAL 3 TIMES DAILY
Qty: 90 TABLET | Refills: 0 | Status: SHIPPED | OUTPATIENT
Start: 2019-10-07 | End: 2019-10-07 | Stop reason: SDUPTHER

## 2019-10-07 RX ORDER — BACLOFEN 5 MG/1
5 TABLET ORAL 3 TIMES DAILY
Qty: 90 TABLET | Refills: 0 | Status: SHIPPED | OUTPATIENT
Start: 2019-10-07 | End: 2019-11-11 | Stop reason: SDUPTHER

## 2019-10-07 NOTE — LETTER
October 7, 2019    Storm Hinds  [address] [place] [address]         [facility] [address] [place] [address]  Phone: [phone]  Fax: [phone] October 7, 2019     Patient: Storm Hinds   YOB: 1980   Date of Visit: 10/7/2019       To Whom It May Concern:    It is my medical opinion that Storm Hinds should remain out of work until 10/21/19.    If you have any questions or concerns, please don't hesitate to call.    Sincerely,        Katie Wasserman MD

## 2019-10-08 NOTE — PROGRESS NOTES
[facility] - Clinic Note    Subjective      Mr. Hinds is a 39 y.o. male who presents to clinic for Follow-up and Back Pain (tremors on the L side of body)    Patient continues to have muscle spasms and back pain. The patient was evaluated by neuro surgery who determined that there was no neurosurgical intervention at this time.  They obtained MRIs of his thoracic spine and brain which did not show any deficits.  Since our last visit his neck pain has improved and the tingling in his arms has improved however now he has tremors in his right leg and extreme spasms in his left leg and his ft.  His foot contracts to an outward position.  He is taking tizanidine 4 times a day which helps for about an hour and then as it wears off his spasms return.  The Valium helped but made him foggy.  The gabapentin is helping some with pain at night.    PMH Storm has a past medical history of Acid reflux.   PSX Storm has no past surgical history on file.    Storm's family history includes Cancer in his father; Hypertension in his mother, paternal grandfather, and paternal grandmother; Stroke in his mother.   SH Storm reports that he has quit smoking. He has quit using smokeless tobacco. He reports that he does not drink alcohol or use drugs.   ALG Storm has No Known Allergies.   MED Storm has a current medication list which includes the following prescription(s): gabapentin, methylprednisolone, ranitidine, tizanidine, baclofen, diazepam, and diclofenac sodium.     Review of Systems   Constitutional: Negative for chills and fever.   Musculoskeletal: Positive for myalgias. Negative for neck pain.   Neurological: Positive for tremors and numbness.     Objective     /84 (BP Location: Right arm, Patient Position: Sitting, BP Method: X-Large (Automatic))   Pulse 110   Resp 16   Ht 6' (1.829 m)   Wt 92.4 kg (203 lb 9.6 oz)   SpO2 97%   BMI 27.61 kg/m²     Physical Exam   Constitutional: He appears well-developed  and well-nourished. No distress.   Eyes: Right eye exhibits no discharge. Left eye exhibits no discharge.   Cardiovascular: Normal rate, regular rhythm and normal heart sounds.   Pulmonary/Chest: Effort normal and breath sounds normal. He has no wheezes. He has no rales.   Neurological: He is alert. He exhibits abnormal muscle tone (Increased muscle tone in the left foot with an outward contraction).   Tremors in the right leg   Skin: Skin is warm and dry.   Psychiatric: He has a normal mood and affect.   Nursing note and vitals reviewed.     Assessment/Plan     1. Muscle spasm  Glutamic acid decarboxylase    Ambulatory referral to Neurology    baclofen (LIORESAL) 5 mg Tab tablet    DISCONTINUED: baclofen (LIORESAL) 5 mg Tab tablet   2. Focal neurological deficit  Glutamic acid decarboxylase    Ambulatory referral to Neurology     Unclear etiology of extreme muscle spasms.  Given new spasm of foot and numbness will refer to Neurology.  Will also check glutamic acid decarboxylase antibody for stiff person syndrome.  He has not had any injuries to suggest tetanus.  Will start baclofen 5 mg t.i.d. and follow up.    Follow up in about 2 weeks (around 10/21/2019) for Follow-up.    Future Appointments   Date Time Provider Department Center   10/21/2019  4:20 PM Katie Wasserman MD [facility]       Elliott Wasserman MD  Family Medicine  [facility]

## 2019-10-09 ENCOUNTER — TELEPHONE (OUTPATIENT)
Dept: NEUROLOGY | Facility: CLINIC | Age: 39
End: 2019-10-09

## 2019-10-09 NOTE — TELEPHONE ENCOUNTER
----- Message from Ligia Carrillo sent at 10/9/2019  3:47 PM CDT -----  Contact: Gladis Hinds (Spouse)  Gladis Hinds (Spouse) calling states PCP put in a referral for th ept to come in to see someone for muscles spasms on 10/7/19 and they have have not heard anything so they were wanting to schedule something cause pt is out of work till this is done....[phone]

## 2019-10-10 ENCOUNTER — PATIENT MESSAGE (OUTPATIENT)
Dept: FAMILY MEDICINE | Facility: CLINIC | Age: 39
End: 2019-10-10

## 2019-10-11 ENCOUNTER — PATIENT MESSAGE (OUTPATIENT)
Dept: FAMILY MEDICINE | Facility: CLINIC | Age: 39
End: 2019-10-11

## 2019-10-14 LAB — GAD65 AB SER-SCNC: 0 NMOL/L

## 2019-10-21 ENCOUNTER — OFFICE VISIT (OUTPATIENT)
Dept: FAMILY MEDICINE | Facility: CLINIC | Age: 39
End: 2019-10-21
Payer: COMMERCIAL

## 2019-10-21 ENCOUNTER — LAB VISIT (OUTPATIENT)
Dept: LAB | Facility: HOSPITAL | Age: 39
End: 2019-10-21
Attending: FAMILY MEDICINE
Payer: COMMERCIAL

## 2019-10-21 VITALS
TEMPERATURE: 98 F | BODY MASS INDEX: 27.85 KG/M2 | WEIGHT: 205.63 LBS | SYSTOLIC BLOOD PRESSURE: 107 MMHG | HEART RATE: 102 BPM | HEIGHT: 72 IN | RESPIRATION RATE: 15 BRPM | OXYGEN SATURATION: 97 % | DIASTOLIC BLOOD PRESSURE: 73 MMHG

## 2019-10-21 DIAGNOSIS — M62.838 MUSCLE SPASM: ICD-10-CM

## 2019-10-21 DIAGNOSIS — R29.818 FOCAL NEUROLOGICAL DEFICIT: Primary | ICD-10-CM

## 2019-10-21 DIAGNOSIS — R29.818 FOCAL NEUROLOGICAL DEFICIT: ICD-10-CM

## 2019-10-21 DIAGNOSIS — M48.00 NEURAL FORAMINAL STENOSIS, MULTILEVEL: ICD-10-CM

## 2019-10-21 DIAGNOSIS — R26.9 GAIT ABNORMALITY: ICD-10-CM

## 2019-10-21 LAB
ALBUMIN SERPL BCP-MCNC: 4.5 G/DL (ref 3.5–5.2)
ALP SERPL-CCNC: 69 U/L (ref 55–135)
ALT SERPL W/O P-5'-P-CCNC: 37 U/L (ref 10–44)
ANION GAP SERPL CALC-SCNC: 10 MMOL/L (ref 8–16)
AST SERPL-CCNC: 29 U/L (ref 10–40)
BASOPHILS # BLD AUTO: 0.01 K/UL (ref 0–0.2)
BASOPHILS NFR BLD: 0.1 % (ref 0–1.9)
BILIRUB SERPL-MCNC: 0.6 MG/DL (ref 0.1–1)
BUN SERPL-MCNC: 18 MG/DL (ref 6–20)
CALCIUM SERPL-MCNC: 9.2 MG/DL (ref 8.7–10.5)
CHLORIDE SERPL-SCNC: 106 MMOL/L (ref 95–110)
CK SERPL-CCNC: 78 U/L (ref 20–200)
CO2 SERPL-SCNC: 23 MMOL/L (ref 23–29)
CREAT SERPL-MCNC: 1.1 MG/DL (ref 0.5–1.4)
CRP SERPL-MCNC: 0.09 MG/DL (ref 0–0.75)
DIFFERENTIAL METHOD: ABNORMAL
EOSINOPHIL # BLD AUTO: 0.3 K/UL (ref 0–0.5)
EOSINOPHIL NFR BLD: 3.8 % (ref 0–8)
ERYTHROCYTE [DISTWIDTH] IN BLOOD BY AUTOMATED COUNT: 13.1 % (ref 11.5–14.5)
ERYTHROCYTE [SEDIMENTATION RATE] IN BLOOD BY WESTERGREN METHOD: 10 MM/HR (ref 0–10)
EST. GFR  (AFRICAN AMERICAN): >60 ML/MIN/1.73 M^2
EST. GFR  (NON AFRICAN AMERICAN): >60 ML/MIN/1.73 M^2
GLUCOSE SERPL-MCNC: 92 MG/DL (ref 70–110)
HCT VFR BLD AUTO: 40.7 % (ref 40–54)
HGB BLD-MCNC: 13.6 G/DL (ref 14–18)
IMM GRANULOCYTES # BLD AUTO: 0.03 K/UL (ref 0–0.04)
IMM GRANULOCYTES NFR BLD AUTO: 0.4 % (ref 0–0.5)
LYMPHOCYTES # BLD AUTO: 2.3 K/UL (ref 1–4.8)
LYMPHOCYTES NFR BLD: 27.2 % (ref 18–48)
MCH RBC QN AUTO: 29.6 PG (ref 27–31)
MCHC RBC AUTO-ENTMCNC: 33.4 G/DL (ref 32–36)
MCV RBC AUTO: 89 FL (ref 82–98)
MONOCYTES # BLD AUTO: 0.6 K/UL (ref 0.3–1)
MONOCYTES NFR BLD: 7.2 % (ref 4–15)
NEUTROPHILS # BLD AUTO: 5.2 K/UL (ref 1.8–7.7)
NEUTROPHILS NFR BLD: 61.3 % (ref 38–73)
NRBC BLD-RTO: 0 /100 WBC
PLATELET # BLD AUTO: 289 K/UL (ref 150–350)
PMV BLD AUTO: 8.6 FL (ref 9.2–12.9)
POTASSIUM SERPL-SCNC: 4 MMOL/L (ref 3.5–5.1)
PROT SERPL-MCNC: 8 G/DL (ref 6–8.4)
RBC # BLD AUTO: 4.6 M/UL (ref 4.6–6.2)
SODIUM SERPL-SCNC: 139 MMOL/L (ref 136–145)
T4 FREE SERPL-MCNC: 0.88 NG/DL (ref 0.71–1.51)
TSH SERPL DL<=0.005 MIU/L-ACNC: 0.32 UIU/ML (ref 0.34–5.6)
WBC # BLD AUTO: 8.43 K/UL (ref 3.9–12.7)

## 2019-10-21 PROCEDURE — 3008F BODY MASS INDEX DOCD: CPT | Mod: CPTII,S$GLB,, | Performed by: FAMILY MEDICINE

## 2019-10-21 PROCEDURE — 3008F PR BODY MASS INDEX (BMI) DOCUMENTED: ICD-10-PCS | Mod: CPTII,S$GLB,, | Performed by: FAMILY MEDICINE

## 2019-10-21 PROCEDURE — 82550 ASSAY OF CK (CPK): CPT

## 2019-10-21 PROCEDURE — 36415 COLL VENOUS BLD VENIPUNCTURE: CPT

## 2019-10-21 PROCEDURE — 80053 COMPREHEN METABOLIC PANEL: CPT

## 2019-10-21 PROCEDURE — 99214 OFFICE O/P EST MOD 30 MIN: CPT | Mod: S$GLB,,, | Performed by: FAMILY MEDICINE

## 2019-10-21 PROCEDURE — 85651 RBC SED RATE NONAUTOMATED: CPT

## 2019-10-21 PROCEDURE — 85025 COMPLETE CBC W/AUTO DIFF WBC: CPT

## 2019-10-21 PROCEDURE — 86038 ANTINUCLEAR ANTIBODIES: CPT

## 2019-10-21 PROCEDURE — 99214 PR OFFICE/OUTPT VISIT, EST, LEVL IV, 30-39 MIN: ICD-10-PCS | Mod: S$GLB,,, | Performed by: FAMILY MEDICINE

## 2019-10-21 PROCEDURE — 84439 ASSAY OF FREE THYROXINE: CPT

## 2019-10-21 PROCEDURE — 84443 ASSAY THYROID STIM HORMONE: CPT

## 2019-10-21 PROCEDURE — 86140 C-REACTIVE PROTEIN: CPT

## 2019-10-21 RX ORDER — TIZANIDINE 4 MG/1
4 TABLET ORAL EVERY 6 HOURS PRN
Qty: 40 TABLET | Refills: 1 | Status: SHIPPED | OUTPATIENT
Start: 2019-10-21 | End: 2019-11-21

## 2019-10-21 RX ORDER — ROPINIROLE 0.5 MG/1
TABLET, FILM COATED ORAL
COMMUNITY
Start: 2019-10-15 | End: 2019-11-21

## 2019-10-21 NOTE — PROGRESS NOTES
[facility] - Clinic Note    Subjective      Mr. Hinds is a 39 y.o. male who presents to clinic for Follow-up (ringing in ears x2w; requesting refill on Zanaflex) and Headache (x3d)    Patient presents for follow-up.  He saw Neurology in [place] and they started him on ropinirole.  They are testing him for Parkinson's.  He states that his muscle spasms and rigidity has improved since starting the ropinirole but is still present.  His tremors have improved since starting baclofen.  He still feels weak on the left side.  He also reports a headache and ringing in the ears since his last visit.  He had vitamin B12 and folate checked which were normal.    PMH Storm has a past medical history of Acid reflux.   PSXH Storm has no past surgical history on file.    Storm's family history includes Cancer in his father; Hypertension in his mother, paternal grandfather, and paternal grandmother; Stroke in his mother.   SH Storm reports that he has quit smoking. He has quit using smokeless tobacco. He reports that he does not drink alcohol or use drugs.   ALG Storm has No Known Allergies.   MED Storm has a current medication list which includes the following prescription(s): baclofen, gabapentin, ranitidine, ropinirole, tizanidine, and diclofenac sodium.     Review of Systems   Constitutional: Negative for chills and fever.   HENT: Negative for congestion and rhinorrhea.    Respiratory: Negative for cough.    Musculoskeletal: Positive for arthralgias and gait problem.     Objective     /73 (BP Location: Right arm, Patient Position: Sitting, BP Method: X-Large (Automatic))   Pulse 102   Temp 98.2 °F (36.8 °C) (Oral)   Resp 15   Ht 6' (1.829 m)   Wt 93.3 kg (205 lb 9.6 oz)   SpO2 97%   BMI 27.88 kg/m²     Physical Exam   Constitutional: He appears well-developed and well-nourished. No distress.   HENT:   Right Ear: External ear normal.   Left Ear: External ear normal.   Eyes: Right eye exhibits no  discharge. Left eye exhibits no discharge.   Cardiovascular: Normal rate, regular rhythm, normal heart sounds and intact distal pulses.   Pulmonary/Chest: Effort normal and breath sounds normal. He has no wheezes. He has no rales.   Musculoskeletal: He exhibits no edema.   Gait is still abnormal but improved from last visit.  Muscular spasm has improved.  Strength is reduced on the left side but improved from previous exam   Neurological: He is alert.   Skin: Skin is warm and dry.   Psychiatric: He has a normal mood and affect.   Nursing note and vitals reviewed.     Assessment/Plan     1. Focal neurological deficit  GABRIEL Screen w/Reflex    Comprehensive metabolic panel    TSH    T4, free    CBC auto differential    Sedimentation rate    C-reactive protein    CK   2. Muscle spasm  tiZANidine (ZANAFLEX) 4 MG tablet   3. Neural foraminal stenosis, multilevel  Ambulatory Referral to Physical/Occupational Therapy   4. Gait abnormality  Ambulatory Referral to Physical/Occupational Therapy     Unclear etiology of patient's symptoms but some modest improvement.  Will check blood work.  If negative will consider EMG.  Refer to physical therapy.    Follow up in about 2 weeks (around 11/4/2019) for follow up.    Future Appointments   Date Time Provider Department Center   10/21/2019  4:20 PM [facility]   10/21/2019  5:15 PM [facility]   11/5/2019  4:40 PM [facility]   12/4/2019  1:00 PM Param Pierre MD [facility] NEURO [facility]       Elliott Wasserman MD  Family Medicine  [facility]

## 2019-10-21 NOTE — LETTER
October 21, 2019    Storm Hinds  [address] [place] [place] [address]         [facility] [address] [place] [address]  Phone: [phone]  Fax: [phone] October 21, 2019     Patient: Storm Hinds   YOB: 1980   Date of Visit: 10/21/2019       To Whom It May Concern:    It is my medical opinion that Storm Hinds should remain out of work until 11/5/19.    If you have any questions or concerns, please don't hesitate to call.    Sincerely,        Elliott Wasserman MD

## 2019-10-22 ENCOUNTER — PATIENT MESSAGE (OUTPATIENT)
Dept: FAMILY MEDICINE | Facility: CLINIC | Age: 39
End: 2019-10-22

## 2019-10-22 LAB — ANA SER QL IF: NORMAL

## 2019-10-23 DIAGNOSIS — R26.9 GAIT ABNORMALITY: ICD-10-CM

## 2019-10-23 DIAGNOSIS — R29.818 FOCAL NEUROLOGICAL DEFICIT: Primary | ICD-10-CM

## 2019-10-27 DIAGNOSIS — R20.0 LEFT ARM NUMBNESS: ICD-10-CM

## 2019-10-27 DIAGNOSIS — R20.0 RIGHT LEG NUMBNESS: ICD-10-CM

## 2019-10-28 RX ORDER — GABAPENTIN 300 MG/1
CAPSULE ORAL
Qty: 30 CAPSULE | Refills: 0 | Status: SHIPPED | OUTPATIENT
Start: 2019-10-28 | End: 2019-12-23 | Stop reason: SDUPTHER

## 2019-10-28 RX ORDER — GABAPENTIN 300 MG/1
CAPSULE ORAL
Qty: 30 CAPSULE | Refills: 0 | Status: SHIPPED | OUTPATIENT
Start: 2019-10-28 | End: 2019-10-28 | Stop reason: SDUPTHER

## 2019-10-29 ENCOUNTER — TELEPHONE (OUTPATIENT)
Dept: FAMILY MEDICINE | Facility: CLINIC | Age: 39
End: 2019-10-29

## 2019-10-29 ENCOUNTER — PATIENT MESSAGE (OUTPATIENT)
Dept: FAMILY MEDICINE | Facility: CLINIC | Age: 39
End: 2019-10-29

## 2019-10-30 NOTE — TELEPHONE ENCOUNTER
Izabella Oliveira. Doesn't do nerve conduction studies. I've messaged via portal the patient if he sees a Neurologist and if so we can refer to him for this test.

## 2019-10-30 NOTE — TELEPHONE ENCOUNTER
Patient has an appt with Dr. Georgia Callejas in [place] on 11/19/2019 at 12:00 for nerve conduction studies. She is an [facility]. Patient notified.

## 2019-11-05 ENCOUNTER — OFFICE VISIT (OUTPATIENT)
Dept: FAMILY MEDICINE | Facility: CLINIC | Age: 39
End: 2019-11-05
Payer: COMMERCIAL

## 2019-11-05 VITALS
TEMPERATURE: 98 F | DIASTOLIC BLOOD PRESSURE: 77 MMHG | HEART RATE: 78 BPM | OXYGEN SATURATION: 99 % | RESPIRATION RATE: 16 BRPM | WEIGHT: 206 LBS | SYSTOLIC BLOOD PRESSURE: 118 MMHG | BODY MASS INDEX: 27.9 KG/M2 | HEIGHT: 72 IN

## 2019-11-05 DIAGNOSIS — R29.818 FOCAL NEUROLOGICAL DEFICIT: ICD-10-CM

## 2019-11-05 DIAGNOSIS — F43.21 SITUATIONAL DEPRESSION: Primary | ICD-10-CM

## 2019-11-05 DIAGNOSIS — R26.9 GAIT ABNORMALITY: ICD-10-CM

## 2019-11-05 PROCEDURE — 3008F PR BODY MASS INDEX (BMI) DOCUMENTED: ICD-10-PCS | Mod: CPTII,S$GLB,, | Performed by: FAMILY MEDICINE

## 2019-11-05 PROCEDURE — 3008F BODY MASS INDEX DOCD: CPT | Mod: CPTII,S$GLB,, | Performed by: FAMILY MEDICINE

## 2019-11-05 PROCEDURE — 99214 PR OFFICE/OUTPT VISIT, EST, LEVL IV, 30-39 MIN: ICD-10-PCS | Mod: S$GLB,,, | Performed by: FAMILY MEDICINE

## 2019-11-05 PROCEDURE — 99214 OFFICE O/P EST MOD 30 MIN: CPT | Mod: S$GLB,,, | Performed by: FAMILY MEDICINE

## 2019-11-05 RX ORDER — BUPROPION HYDROCHLORIDE 150 MG/1
150 TABLET ORAL DAILY
Qty: 30 TABLET | Refills: 1 | Status: SHIPPED | OUTPATIENT
Start: 2019-11-05 | End: 2020-01-02

## 2019-11-05 NOTE — LETTER
November 5, 2019    Storm Hinds  [address] [place] [address]         [facility] [address] [place] [address]  Phone: [phone]  Fax: [phone] November 5, 2019     Patient: Storm Hinds   YOB: 1980   Date of Visit: 11/5/2019       To Whom It May Concern:    It is my medical opinion that Storm Hinds should remain out of work until 11/26/19. He has future appointments and tests as below:    Future Appointments   Date Center   11/11/2019 [facility]   11/19/2019 [place]     11/21/2019 [facility]       If you have any questions or concerns, please don't hesitate to call.    Sincerely,        Elliott Wasserman MD

## 2019-11-06 NOTE — PROGRESS NOTES
[facility] - Clinic Note    Subjective      Mr. Hinds is a 39 y.o. male who presents to clinic for Follow-up and Headache (x2d ago)    Patient reports that since our last visit his symptoms have not really changed.  He has not started physical therapy because they have not called him yet.  He reports intermittent headaches.  He has not been feeling like doing anything.    PMH Storm has a past medical history of Acid reflux.   PSXH Storm has no past surgical history on file.   FH Storm's family history includes Cancer in his father; Hypertension in his mother, paternal grandfather, and paternal grandmother; Stroke in his mother.   SH Storm reports that he has quit smoking. He has quit using smokeless tobacco. He reports that he does not drink alcohol or use drugs.   ALG Storm has No Known Allergies.   MED Storm has a current medication list which includes the following prescription(s): baclofen, gabapentin, ranitidine, ropinirole, tizanidine, bupropion, and diclofenac sodium.     Review of Systems   Constitutional: Negative for chills and fever.   HENT: Negative for congestion and rhinorrhea.    Respiratory: Negative for cough.    Musculoskeletal: Positive for arthralgias and gait problem.   Psychiatric/Behavioral: Negative for suicidal ideas.     Objective     /77 (BP Location: Left arm, Patient Position: Sitting, BP Method: X-Large (Automatic))   Pulse 78   Temp 97.9 °F (36.6 °C) (Oral)   Resp 16   Ht 6' (1.829 m)   Wt 93.4 kg (206 lb)   SpO2 99%   BMI 27.94 kg/m²     Physical Exam   Constitutional: He appears well-developed and well-nourished. No distress.   HENT:   Right Ear: External ear normal.   Left Ear: External ear normal.   Eyes: Right eye exhibits no discharge. Left eye exhibits no discharge.   Cardiovascular: Normal rate, regular rhythm, normal heart sounds and intact distal pulses.   Pulmonary/Chest: Effort normal and breath sounds normal. He has no wheezes. He has no rales.    Musculoskeletal: He exhibits no edema.   Gait is still abnormal but improved from last visit.  Muscular spasm has improved.    Neurological: He is alert.   Skin: Skin is warm and dry.   Psychiatric: He has a normal mood and affect.   Nursing note and vitals reviewed.     Assessment/Plan     1. Situational depression  buPROPion (WELLBUTRIN XL) 150 MG TB24 tablet   2. Focal neurological deficit     3. Gait abnormality       Patient is scheduled to have an EMG and follow-up with Neurology later this month.  PHQ 9:  22. Will start Wellbutrin for patient's depression symptoms.    Follow up in about 3 weeks (around 11/26/2019) for follow up.    Future Appointments   Date Time Provider Department Center   11/11/2019  2:00 PM Chay Jones MD [facility] NEURO [facility]   11/19/2019 12:00 PM Georgia Callejas DO [address] DAGO [place] [facility]   11/26/2019  8:00 AM Elliott Wasserman MD [facility]         Elliott Wasserman MD  Family Medicine  [facility]

## 2019-11-08 ENCOUNTER — PATIENT OUTREACH (OUTPATIENT)
Dept: ADMINISTRATIVE | Facility: OTHER | Age: 39
End: 2019-11-08

## 2019-11-11 ENCOUNTER — PATIENT MESSAGE (OUTPATIENT)
Dept: FAMILY MEDICINE | Facility: CLINIC | Age: 39
End: 2019-11-11

## 2019-11-11 ENCOUNTER — PATIENT MESSAGE (OUTPATIENT)
Dept: NEUROLOGY | Facility: CLINIC | Age: 39
End: 2019-11-11

## 2019-11-11 ENCOUNTER — OFFICE VISIT (OUTPATIENT)
Dept: NEUROLOGY | Facility: CLINIC | Age: 39
End: 2019-11-11
Payer: COMMERCIAL

## 2019-11-11 VITALS
BODY MASS INDEX: 28.44 KG/M2 | HEART RATE: 112 BPM | SYSTOLIC BLOOD PRESSURE: 113 MMHG | WEIGHT: 210 LBS | HEIGHT: 72 IN | DIASTOLIC BLOOD PRESSURE: 76 MMHG

## 2019-11-11 DIAGNOSIS — G24.9 DYSTONIA: Primary | ICD-10-CM

## 2019-11-11 DIAGNOSIS — M62.838 MUSCLE SPASM: ICD-10-CM

## 2019-11-11 PROCEDURE — 3008F BODY MASS INDEX DOCD: CPT | Mod: CPTII,S$GLB,, | Performed by: PSYCHIATRY & NEUROLOGY

## 2019-11-11 PROCEDURE — 99205 OFFICE O/P NEW HI 60 MIN: CPT | Mod: S$GLB,,, | Performed by: PSYCHIATRY & NEUROLOGY

## 2019-11-11 PROCEDURE — 99999 PR PBB SHADOW E&M-EST. PATIENT-LVL III: ICD-10-PCS | Mod: PBBFAC,,, | Performed by: PSYCHIATRY & NEUROLOGY

## 2019-11-11 PROCEDURE — 99205 PR OFFICE/OUTPT VISIT, NEW, LEVL V, 60-74 MIN: ICD-10-PCS | Mod: S$GLB,,, | Performed by: PSYCHIATRY & NEUROLOGY

## 2019-11-11 PROCEDURE — 3008F PR BODY MASS INDEX (BMI) DOCUMENTED: ICD-10-PCS | Mod: CPTII,S$GLB,, | Performed by: PSYCHIATRY & NEUROLOGY

## 2019-11-11 PROCEDURE — 99999 PR PBB SHADOW E&M-EST. PATIENT-LVL III: CPT | Mod: PBBFAC,,, | Performed by: PSYCHIATRY & NEUROLOGY

## 2019-11-11 RX ORDER — BACLOFEN 5 MG/1
5 TABLET ORAL 3 TIMES DAILY
Qty: 90 TABLET | Refills: 0 | Status: SHIPPED | OUTPATIENT
Start: 2019-11-11 | End: 2019-12-23 | Stop reason: SDUPTHER

## 2019-11-11 RX ORDER — TRIHEXYPHENIDYL HYDROCHLORIDE 2 MG/1
TABLET ORAL
Qty: 60 TABLET | Refills: 11 | Status: SHIPPED | OUTPATIENT
Start: 2019-11-11 | End: 2019-12-06

## 2019-11-11 NOTE — LETTER
November 12, 2019      Elliott Wasserman MD  [address] [facility] [place] [address]           [facility]  [address]  [place] [address]  Phone: [phone]  Fax: [phone]          Patient: Storm Hinds   MR Number: [id number]   YOB: 1980   Date of Visit: 11/11/2019       Dear Dr. Elliott Wasserman:    Thank you for referring Storm Hinds to me for evaluation. Attached you will find relevant portions of my assessment and plan of care.    If you have questions, please do not hesitate to call me. I look forward to following Storm Hinds along with you.    Sincerely,    Niki Cunningham MD    Enclosure  CC:  No Recipients    If you would like to receive this communication electronically, please contact [email or web address] or [phone] to request more information on [email or web address] Link access.    For providers and/or their staff who would like to refer a patient to [facility], please contact us through our one-stop-shop provider referral line, [facility], at [phone].    If you feel you have received this communication in error or would no longer like to receive these types of communications, please e-mail [email or web address]

## 2019-11-12 ENCOUNTER — PATIENT OUTREACH (OUTPATIENT)
Dept: ADMINISTRATIVE | Facility: HOSPITAL | Age: 39
End: 2019-11-12

## 2019-11-12 PROBLEM — G24.9 DYSTONIA: Status: ACTIVE | Noted: 2019-11-12

## 2019-11-12 NOTE — PROGRESS NOTES
NEUROLOGY OUTPATIENT CLINIC NOTE    Patient Name:  Storm Hinds  Patient MRN:  [id number]    HPI:  Patient is a 39 y.o. male with PMHx of chronic low back pain and HLD who presents to [facility] 11/11/19 for 2 months of painful LLE spasticity affecting gait and keeping patient from working as an .  Patient states that he was going to his son's football game on 09/16/19 when he started to have some pain in the LLE.  He ignored it, but noticed at the game that the leg continued to get tighter and more painful throughout the game.  He agreed to go to the doctor to establish care and saw his new PCP Dr. Wasserman on 9/18/19.  XRs of the spine following this visit were unrevealing and he followed up with MRIs of the spine which also did not seem to explain the muscle spasms.  Patient notes that the leg stays in an extended posture with the knee almost locked (though he can force it to flex) and the foot with an upgoing great toe, but fanning of the other toes and slight eversion of the foot.  Wife states he has been walking around at home by leaning with his R side against the wall to allow his LLE to stay extended while bearing some weight.  Associated symptoms include LLE cramping and sharp/shooting pains, some subjective hyposthesia of LLE, LLE feeling cold intermittently, some erythema of LLE intermittently, diffuse tremors, mild RLE muscle spasms, intermittent jaw clenching, some constipation, decreased urinary output, intermittent tinnitus, and intermittent blurring of vision.  Patient has been started on baclofen and tizanidine which help to relieve the symptoms somewhat but also are very sedating to the patient--states that he would not be able to take these at work.  He saw a neurologist in [place] who they state did not spend much time with them, stated that he likely had early Parkinson's, and started ropinirole 0.5 mg tid.  Since starting the ropinirole, patient has had some  depressed mood and does not feel that it has made any difference in his LLE.  Patient denies family hx of PD, other neurologic disease, or autoimmune disease.  He does have the recent constipation and depression (now on bupriopion) but denies REM sleep behavior disorder or constipation.  He and his wife have noticed a fine tremor in all extremities, low amplitude but high frequency with no clear relieving or exacerbating factors.    ROS:  General:  No fever, no chills, + fatigue, no change in weight  HEENT:  No headache, no changes in vision  Respiratory:  No cough, no SOB  Cardiovascular:  No chest pain, no palpitations  GI:  No abdominal pain, no n/v/d, +occasional constipation relieved by laxative  :  +decreased UOP with voiding  Skin:  No rashes, no pruritus, no wounds  Musculoskeletal:  + myalgias, no arthralgias  Hematologic:  No easy bruising or bleeding  Endocrine:  No heat or cold intolerance, no polyuria/polydipsia  Neuro:  + tremors, no focal weakness, no paresthesias  Psych:  No anxiety, + depression    PMHx:  Patient Active Problem List   Diagnosis    Arthralgia of multiple joints    Elevated liver enzymes    Elevated cholesterol with elevated triglycerides     PSHx:  No past surgical history on file.    Medications:  Current Outpatient Medications on File Prior to Visit   Medication Sig Dispense Refill    buPROPion (WELLBUTRIN XL) 150 MG TB24 tablet Take 1 tablet (150 mg total) by mouth once daily. 30 tablet 1    gabapentin (NEURONTIN) 300 MG capsule TAKE 1 CAPSULE BY MOUTH ONCE DAILY IN THE EVENING 30 capsule 0    ranitidine (ZANTAC) 300 MG tablet Take 1 tablet (300 mg total) by mouth every evening. 30 tablet 11    rOPINIRole (REQUIP) 0.5 MG tablet       tiZANidine (ZANAFLEX) 4 MG tablet Take 1 tablet (4 mg total) by mouth every 6 (six) hours as needed. 40 tablet 1    diclofenac sodium (VOLTAREN) 1 % Gel Apply 2 g topically 3 (three) times daily as needed. 100 g 1     No current  facility-administered medications on file prior to visit.      Allergies:  Review of patient's allergies indicates:  No Known Allergies     Family Hx:  Leukemia--father  Stroke--maternal grandmother    Social Hx:  Patient is  with three kids.  Works as an , very much missing his work from being out the past 2 months.  Denies tobacco use, rare (almost no) EtOH use, no illicits.  Has a superstition that all the men in his family that get sick before or during March end up dying in March if they are not better, jokes that he has to get the LLE issues fixed before March or else he'll die.    Physical Exam:  /76   Pulse (!) 112   Ht 6' (1.829 m)   Wt 95.3 kg (210 lb)   BMI 28.48 kg/m²   General:  Well-developed, well-nourished, nad  HEENT:  NCAT, PERRL, EOMI, oropharygneal membranes non-erythematous/without exudate  Neck:  Supple, normal ROM without nuchal rigidity, but some jaw clenching  Respiratory:  Symmetric expansion, no increased wob  CVS:  No LE edema.  Peripheral pulses 2+ and symmetric--radial, dorsalis pedis, posterior tibial.  GI:  Abd soft, non-distended  Skin:  No visible rashes or wounds  Psych:  Pleasant, cooperative with exam.  Speech and thought content appropriate though seems to have some jaw clenching which affects how he is speaking off and on throughout exam.  Neurologic Exam:  Mental Status:  AAOx3.  Converses easily.  Able to spell 'world' forward and backward without error.  Cranial Nerves:  PERRLA, EOMI.  Facial movement intact and symmetric.  Tongue protrudes midline, palate raises symmetrically.  Trapezius 5/5 bilaterally.  Motor:  Markedly increased tone in LLE, mild increase in tone throughout other extremities. BUE 5/5 throughout.  RLE hip flexion 5/5, knee flexion/extension 5/5, dorsiflexion/plantarflexion 5/5.  LLE hip flexion limited 2/2 pain and muscle tone but 4-/5, knee flexion 5/5, knee extension limited by tone 3/5, dorsiflexion of the foot 3/5 and  limited by tone, plantarflexion of the foot 4/5.  Sensory:  Diminished vibratory sensation in LLE compared to RLE at digits.  Subjective decreased temperature sensation in LLE.  Reflexes:  Bilateral biceps, brachioradialis reflexes 2+.  Bilateral patellar reflexes 3+.  LLE with upgoing toe at baseline, no change with testing for Babinski.  No ankle clonus though difficult to test with increased tone.  Coordination:  +low amplitude, high frequency tremor in BUE.  Slight slowing in LUE on KAIN, finger tapping.  FNF intact without dysmetria or ataxia.  Gait:  LLE remains extended with patient compensating by leaning with trunk toward the right.  Avoids putting weight on LLE.    Labs:  Results: CBC:   Lab Results   Component Value Date/Time    WBC 8.43 10/21/2019 02:42 PM    RBC 4.60 10/21/2019 02:42 PM    HGB 13.6 (L) 10/21/2019 02:42 PM    HCT 40.7 10/21/2019 02:42 PM     10/21/2019 02:42 PM    MCV 89 10/21/2019 02:42 PM    MCH 29.6 10/21/2019 02:42 PM    MCHC 33.4 10/21/2019 02:42 PM     CMP:   Lab Results   Component Value Date/Time    GLU 92 10/21/2019 02:42 PM    CALCIUM 9.2 10/21/2019 02:42 PM    ALBUMIN 4.5 10/21/2019 02:42 PM    PROT 8.0 10/21/2019 02:42 PM     10/21/2019 02:42 PM    K 4.0 10/21/2019 02:42 PM    CO2 23 10/21/2019 02:42 PM     10/21/2019 02:42 PM    BUN 18 10/21/2019 02:42 PM    CREATININE 1.1 10/21/2019 02:42 PM    ALKPHOS 69 10/21/2019 02:42 PM    ALT 37 10/21/2019 02:42 PM    AST 29 10/21/2019 02:42 PM    BILITOT 0.6 10/21/2019 02:42 PM     Imagin19 MRI C spine w/o contrast:  1. Multilevel degenerative disc disease from C3-C5 resulting in moderate bilateral neural foraminal narrowing.  2. Degenerative disc disease at C5-C6 resulting in mild bilateral neural foraminal narrowing.  3. Degenerative disc disease at C6-C7 resulting in mild central spinal canal stenosis with severe narrowing of the right neural foramen and moderate narrowing the left neural  foramen.    09/23/19 MRI L spine w/o contrast:  1. Sacralization of L5.  2. Grade 1 retrolisthesis of L4 on L5.  3. Degenerative disc disease at L4-L5 with a small central focal disc protrusion resulting in mild central spinal canal stenosis with mild bilateral neural foraminal narrowing.  4. Signal abnormality within the posterior central L4-L5 disc consistent with a small annular fissure.    10/02/19 MRI T spine w/o contrast:  Essentially normal MRI of the thoracic spine.    10/02/19 MRI Brain w/ w/o contrast:  No acute intracranial process. Normal appearing MRI of the Brain.    Additional Diagnotic Testing:  N/a    ASSESSMENT/PLAN:  Patient is a 39 y.o. male with a PMHx of chronic low back pain and HLD who presents to [facility] clinic 11/11/19 due to 2 month course of painful LLE spasticity affecting gait.    Chief Complaint   Patient presents with    Consult     Problem List Items Addressed This Visit        1 - High    Dystonia - Primary    Overview     Onset of dystonic posture in LLE 09/16/19.         Current Assessment & Plan     -Discussed with movement disorder specialist Dr. Jackson  -Concern for dystonia, possible early sign of PD  -Patient has FIONA Scan scheduled, will follow up results  -Trial of trihexyphenidyl 2 mg bid (starting with half tablet bid for first week)  -If having residual symptoms, will consider starting sinemet  -Return to clinic in 2 months--will see with Dr. Jackson if possible   -Patient to wean off of tizanidine and then baclofen as tolerated         Relevant Medications    trihexyphenidyl (ARTANE) 2 MG tablet        Niki Cunningham MD  Pager:  [phone] [place] [address]  [phone]

## 2019-11-12 NOTE — ASSESSMENT & PLAN NOTE
-Discussed with movement disorder specialist Dr. Jackson  -Concern for dystonia, possible early sign of PD  -Patient has FIONA Scan scheduled, will follow up results  -Trial of trihexyphenidyl 2 mg bid (starting with half tablet bid for first week)  -If having residual symptoms, will consider starting sinemet  -Return to clinic in 2 months--will see with Dr. Jackson if possible   -Patient to wean off of tizanidine and then baclofen as tolerated

## 2019-11-13 ENCOUNTER — PATIENT MESSAGE (OUTPATIENT)
Dept: NEUROLOGY | Facility: CLINIC | Age: 39
End: 2019-11-13

## 2019-11-19 ENCOUNTER — PROCEDURE VISIT (OUTPATIENT)
Dept: PHYSICAL MEDICINE AND REHAB | Facility: CLINIC | Age: 39
End: 2019-11-19
Payer: COMMERCIAL

## 2019-11-19 ENCOUNTER — TELEPHONE (OUTPATIENT)
Dept: PHYSICAL MEDICINE AND REHAB | Facility: CLINIC | Age: 39
End: 2019-11-19

## 2019-11-19 DIAGNOSIS — R29.818 FOCAL NEUROLOGICAL DEFICIT: ICD-10-CM

## 2019-11-19 DIAGNOSIS — R26.9 GAIT ABNORMALITY: ICD-10-CM

## 2019-11-19 PROCEDURE — 95910 NRV CNDJ TEST 7-8 STUDIES: CPT | Mod: S$GLB,,, | Performed by: PHYSICAL MEDICINE & REHABILITATION

## 2019-11-19 PROCEDURE — 95910 PR NERVE CONDUCTION STUDY; 7-8 STUDIES: ICD-10-PCS | Mod: S$GLB,,, | Performed by: PHYSICAL MEDICINE & REHABILITATION

## 2019-11-19 PROCEDURE — 95886 MUSC TEST DONE W/N TEST COMP: CPT | Mod: S$GLB,,, | Performed by: PHYSICAL MEDICINE & REHABILITATION

## 2019-11-19 PROCEDURE — 95886 PR EMG COMPLETE, W/ NERVE CONDUCTION STUDIES, 5+ MUSCLES: ICD-10-PCS | Mod: S$GLB,,, | Performed by: PHYSICAL MEDICINE & REHABILITATION

## 2019-11-19 NOTE — PROCEDURES
Procedures       [facility]  Physical Medicine and Rehabilitation   [address] [place] [address], [address]  [place] [address]             Patient: Storm Hinds   Patient ID: [id number]   Sex:     Date of Birth:     Age:     Notes:     Last visit date: 11/19/2019         Visit date and time: 11/19/2019 12:04   Patient Age on Visit Date:     Referring Physician:     Diagnoses:         Temors. Weakness. Blurry vision. Difficulty swallowing in certain positions  Sensory NCS      Nerve / Sites Rec. Site Onset Lat Peak Lat Ref. NP Amp Ref. PP Amp Ref. Segments Distance Velocity     ms ms ms µV µV µV µV  cm m/s   R Sural - Ankle (Calf)      Calf Ankle 3.02 3.91 ?4.20 8.7 ?5.0 13.9 ?5.0 Calf - Ankle 14 46   L Sural - Ankle (Calf)      Calf Ankle 3.13 3.75 ?4.20 8.2 ?5.0 5.8 ?5.0 Calf - Ankle 14 45   R Superficial peroneal - Ankle      Lat leg Ankle 2.19 3.07 ?4.40 5.5 ?5.0 7.8 ?5.0 Lat leg - Ankle 14 64   L Superficial peroneal - Ankle      Lat leg Ankle 2.34 2.86 ?4.40 2.7 ?5.0 9.5 ?5.0 Lat leg - Ankle 14 60       Motor NCS      Nerve / Sites Muscle Latency Ref. Amplitude Ref. Amp % Duration Segments Distance Lat Diff Velocity Ref.     ms ms mV mV % ms  cm ms m/s m/s   R Peroneal - EDB      Ankle EDB 4.06 ?6.20 5.5 ?2.0 100 6.87 Ankle - EDB 8         Fib head EDB 11.72  4.9  89.1 6.88 Fib head - Ankle 35 7.66 46 ?39      Pop fossa EDB 12.92  4.7  86.1 6.93 Pop fossa - Fib head 10 1.20 83 ?39   L Peroneal - EDB      Ankle EDB 4.11 ?6.20 3.0 ?2.0 100 6.98 Ankle - EDB 8         Fib head EDB 12.14  2.7  89 7.19 Fib head - Ankle 37 8.02 46 ?39      Pop fossa EDB 13.85  2.5  82.1  Pop fossa - Fib head 10 1.72 58 ?39   R Tibial - AH      Ankle AH 3.33 ?6.00 11.7 ?3.0 100 7.40 Ankle - AH 8         Pop fossa AH 12.71  0.5  4.17 7.03 Pop fossa - Ankle 40 9.38 43 ?39   L Tibial - AH      Ankle AH 4.53 ?6.00 3.3 ?3.0 100 6.25 Ankle - AH 8         Pop fossa AH 13.23  2.0  61.7  Pop fossa - Ankle 34 8.70 39 ?39       F   Wave      Nerve Fmin Ref.    ms ms   R Tibial - AH 55.89 ?58.00   L Tibial - AH 52.60 ?58.00       EMG Summary Table     Spontaneous MUAP Recruitment   Muscle IA Fib PSW Fasc H.F. Amp Dur. PPP Pattern   L. Triceps brachii 1+ None None None None 2+ 2+ 2+ Reduced   R. Triceps brachii 1+ None None None None 2+ 2+ 2+ Reduced   L. Deltoid 1+ None None None None 1+ 1+ 1+ Reduced   R. Deltoid 1+ None None None None 1+ 1+ 1+ Discrete   L. Biceps brachii 1+ None 1+ None None 2+ 2+ 2+ Reduced   R. Biceps brachii 1+ None None None None 1+ 1+ 1+ Reduced   L. Extensor carpi radialis brevis 1+ None 1+ None None 2+ 2+ 2+ Reduced   R. Extensor carpi radialis brevis 1+ None 1+ None None 1+ 1+ 1+ Reduced   L. First dorsal interosseous 1+ None 2+ None None 2+ 2+ 2+ Reduced   R. First dorsal interosseous 1+ None 1+ None None 2+ 2+ 2+ Reduced   L. Rectus femoris 1+ None 2+ None None 2+ 2+ 2+ Reduced   R. Rectus femoris 1+ None None 1+ None 2+ 2+ 2+ Reduced   L. Biceps femoris (short head) 1+ None 2+ None None 2+ 2+ 2+ Reduced   R. Biceps femoris (short head) 1+ None None None None 2+ 2+ 2+ Reduced   L. Gastrocnemius (Medial head) 1+ None 1+ None None 2+ 2+ 2+ Reduced   R. Gastrocnemius (Medial head) 1+ None None None None 1+ 1+ 1+ Reduced   L. Tibialis anterior 1+ None 1+ None None 2+ 1+ 2+ Reduced   R. Tibialis anterior 1+ None None None None 2+ 2+ 2+ Reduced   L. Vastus lateralis 1+ None None None None 2+ 2+ 2+ Reduced   R. Vastus lateralis 1+ None 1+ None None 2+ 2+ 2+ Reduced       Summary    The motor conduction test was normal in all 4 of the tested nerves: R Peroneal - EDB, L Peroneal - EDB, R Tibial - AH, L Tibial - AH.    The sensory conduction test was performed on 4 nerve(s). The results were normal in 3 nerve(s): R Sural - Ankle (Calf), L Sural - Ankle (Calf), R Superficial peroneal - Ankle. Results outside the specified normal range were found in 1 nerve(s), as follows:   In the L Superficial peroneal - Ankle study  o the  peak amplitude result was reduced for Lat leg stimulation    The F wave study was normal in all 2 of the tested nerves: R Tibial - AH, L Tibial - AH.    The needle EMG study was abnormal in all 20 tested muscles.   Abnormal spontaneous/insertional activity was found in L. Triceps brachii, R. Triceps brachii, L. Deltoid, R. Deltoid, L. Biceps brachii, R. Biceps brachii, L. Extensor carpi radialis brevis, R. Extensor carpi radialis brevis, L. First dorsal interosseous, R. First dorsal interosseous, L. Rectus femoris, R. Rectus femoris, L. Biceps femoris (short head), R. Biceps femoris (short head), L. Gastrocnemius (Medial head), R. Gastrocnemius (Medial head), L. Tibialis anterior, R. Tibialis anterior, L. Vastus lateralis, R. Vastus lateralis.   The MUP waveform abnormality was found in L. Triceps brachii, R. Triceps brachii, L. Deltoid, R. Deltoid, L. Biceps brachii, R. Biceps brachii, L. Extensor carpi radialis brevis, R. Extensor carpi radialis brevis, L. First dorsal interosseous, R. First dorsal interosseous, L. Rectus femoris, R. Rectus femoris, L. Biceps femoris (short head), R. Biceps femoris (short head), L. Gastrocnemius (Medial head), R. Gastrocnemius (Medial head), L. Tibialis anterior, R. Tibialis anterior, L. Vastus lateralis, R. Vastus lateralis.   Abnormal interference pattern was found in L. Triceps brachii, R. Triceps brachii, L. Deltoid, R. Deltoid, L. Biceps brachii, R. Biceps brachii, L. Extensor carpi radialis brevis, R. Extensor carpi radialis brevis, L. First dorsal interosseous, R. First dorsal interosseous, L. Rectus femoris, R. Rectus femoris, L. Biceps femoris (short head), R. Biceps femoris (short head), L. Gastrocnemius (Medial head), R. Gastrocnemius (Medial head), L. Tibialis anterior, R. Tibialis anterior, L. Vastus lateralis, R. Vastus lateralis.          Conclusion:     ABNORMAL STUDY    Every muscle tested had large MUAPs indicative of a neurogenic process. There was ACTIVE  denervation noted as above. Fasiculations were NOT prominent. If imaging of spine is negative, consider a motor neuron disease           ____________________________  Georgia Martini D.O.

## 2019-11-20 ENCOUNTER — PATIENT MESSAGE (OUTPATIENT)
Dept: PHYSICAL MEDICINE AND REHAB | Facility: CLINIC | Age: 39
End: 2019-11-20

## 2019-11-20 ENCOUNTER — TELEPHONE (OUTPATIENT)
Dept: NEUROLOGY | Facility: CLINIC | Age: 39
End: 2019-11-20

## 2019-11-20 NOTE — TELEPHONE ENCOUNTER
----- Message from Olga Melgar RN sent at 11/19/2019 12:59 PM CST -----  Contact: pt @ [phone] or [phone]  Lio, Please contact the patient and request information on who is the company handling his short term disability and contact number. Usually there is a  that faxes a form to MD to complete.     Thanks in advance!  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 Pls fax to  (claim# [id number])

## 2019-11-21 ENCOUNTER — PATIENT MESSAGE (OUTPATIENT)
Dept: NEUROLOGY | Facility: CLINIC | Age: 39
End: 2019-11-21

## 2019-11-21 ENCOUNTER — PATIENT MESSAGE (OUTPATIENT)
Dept: PHYSICAL MEDICINE AND REHAB | Facility: CLINIC | Age: 39
End: 2019-11-21

## 2019-11-21 ENCOUNTER — OFFICE VISIT (OUTPATIENT)
Dept: FAMILY MEDICINE | Facility: CLINIC | Age: 39
End: 2019-11-21
Payer: COMMERCIAL

## 2019-11-21 VITALS
TEMPERATURE: 98 F | SYSTOLIC BLOOD PRESSURE: 125 MMHG | HEART RATE: 91 BPM | RESPIRATION RATE: 15 BRPM | OXYGEN SATURATION: 98 % | BODY MASS INDEX: 29.35 KG/M2 | HEIGHT: 72 IN | WEIGHT: 216.69 LBS | DIASTOLIC BLOOD PRESSURE: 80 MMHG

## 2019-11-21 DIAGNOSIS — G24.9 DYSTONIA: Primary | ICD-10-CM

## 2019-11-21 DIAGNOSIS — F43.21 SITUATIONAL DEPRESSION: ICD-10-CM

## 2019-11-21 PROCEDURE — 3008F BODY MASS INDEX DOCD: CPT | Mod: CPTII,S$GLB,, | Performed by: FAMILY MEDICINE

## 2019-11-21 PROCEDURE — 3008F PR BODY MASS INDEX (BMI) DOCUMENTED: ICD-10-PCS | Mod: CPTII,S$GLB,, | Performed by: FAMILY MEDICINE

## 2019-11-21 PROCEDURE — 99214 OFFICE O/P EST MOD 30 MIN: CPT | Mod: S$GLB,,, | Performed by: FAMILY MEDICINE

## 2019-11-21 PROCEDURE — 99214 PR OFFICE/OUTPT VISIT, EST, LEVL IV, 30-39 MIN: ICD-10-PCS | Mod: S$GLB,,, | Performed by: FAMILY MEDICINE

## 2019-11-21 NOTE — PROGRESS NOTES
[facility] - Clinic Note    Subjective      Mr. Hinds is a 39 y.o. male who presents to clinic for Follow-up    Patient has EMG done earlier this week which showed active denervation and abnormalities and many nerves in the upper and lower extremities.  He was supposed to have the heide test today but because of the way was ordered it was going to cause 3000 dollar so did not have the test done.  He is now off the ropinirole and Zanaflex.  He is taking baclofen as needed.  He is taking the Artane with some improvement in his symptoms.  He does have some difficulty with gait given symptoms in his legs.    PMH Storm has a past medical history of Acid reflux.   PSXH Storm has no past surgical history on file.    Storm's family history includes Cancer in his father; Hypertension in his mother, paternal grandfather, and paternal grandmother; Stroke in his mother.   SH Storm reports that he has quit smoking. He has quit using smokeless tobacco. He reports that he does not drink alcohol or use drugs.   ALG Storm has No Known Allergies.   MED Storm has a current medication list which includes the following prescription(s): baclofen, bupropion, gabapentin, ranitidine, trihexyphenidyl, and diclofenac sodium.     Review of Systems   Constitutional: Negative for chills and fever.   HENT: Negative for congestion and rhinorrhea.    Respiratory: Negative for cough.    Musculoskeletal: Positive for arthralgias and gait problem.   Psychiatric/Behavioral: Negative for suicidal ideas.     Objective     /80 (BP Location: Left arm, Patient Position: Sitting, BP Method: X-Large (Automatic))   Pulse 91   Temp 98.1 °F (36.7 °C) (Oral)   Resp 15   Ht 6' (1.829 m)   Wt 98.3 kg (216 lb 11.2 oz)   SpO2 98%   BMI 29.39 kg/m²     Physical Exam   Constitutional: He appears well-developed and well-nourished. No distress.   HENT:   Right Ear: External ear normal.   Left Ear: External ear normal.   Eyes: Right eye exhibits no  discharge. Left eye exhibits no discharge.   Cardiovascular: Normal rate.   Pulmonary/Chest: Effort normal and breath sounds normal.   Musculoskeletal: He exhibits no edema.   Gait is still abnormal. Muscular spasm has improved.    Neurological: He is alert.   Skin: Skin is warm and dry.   Psychiatric: He has a normal mood and affect.   Nursing note and vitals reviewed.     Assessment/Plan     1. Dystonia     2. Situational depression       Given results of EMG will contact neurologist to see what further needs to be done.  Per wife mood has improved since starting the Wellbutrin.  Will continue for now.  Had scheduled physical therapy for patient to help with his gait.  Recommended patient use a cane so he does not fall.    Follow up in about 3 weeks (around 12/12/2019) for Follow-up.    Future Appointments   Date Time Provider Department Center   12/2/2019 11:00 AM Tye Balbuena PT [facility]   12/12/2019  4:20 PM Elliott Wasserman MD [facility]         Elliott Wasserman MD  Family Medicine  [facility]

## 2019-11-21 NOTE — LETTER
November 21, 2019    Storm Hinds  [address] [place] [address]         [facility] - [facility]  [address] [place] [address] [place] [address]  Phone: [phone]  Fax: [phone] November 21, 2019     Patient: Storm Hinds   YOB: 1980   Date of Visit: 11/21/2019       To Whom It May Concern:    It is my medical opinion that Storm Hinds should remain out of work until 12/12/19.     Future Appointments   Date Time Provider Department Center   12/2/2019 11:00 AM Tye Balbuena PT [facility]       If you have any questions or concerns, please don't hesitate to call.    Sincerely,        Elliott Wasserman MD

## 2019-11-25 ENCOUNTER — PATIENT MESSAGE (OUTPATIENT)
Dept: NEUROLOGY | Facility: CLINIC | Age: 39
End: 2019-11-25

## 2019-11-27 ENCOUNTER — PATIENT MESSAGE (OUTPATIENT)
Dept: FAMILY MEDICINE | Facility: CLINIC | Age: 39
End: 2019-11-27

## 2019-11-27 ENCOUNTER — TELEPHONE (OUTPATIENT)
Dept: FAMILY MEDICINE | Facility: CLINIC | Age: 39
End: 2019-11-27

## 2019-11-27 NOTE — TELEPHONE ENCOUNTER
I have tried messaging the neurologists but have not got a response yet. Continue trying to reach out to them and I will do the same.

## 2019-12-02 ENCOUNTER — PATIENT MESSAGE (OUTPATIENT)
Dept: NEUROLOGY | Facility: CLINIC | Age: 39
End: 2019-12-02

## 2019-12-03 ENCOUNTER — TELEPHONE (OUTPATIENT)
Dept: NEUROLOGY | Facility: CLINIC | Age: 39
End: 2019-12-03

## 2019-12-03 ENCOUNTER — PATIENT MESSAGE (OUTPATIENT)
Dept: NEUROLOGY | Facility: OTHER | Age: 39
End: 2019-12-03

## 2019-12-03 ENCOUNTER — CLINICAL SUPPORT (OUTPATIENT)
Dept: REHABILITATION | Facility: HOSPITAL | Age: 39
End: 2019-12-03
Attending: FAMILY MEDICINE
Payer: COMMERCIAL

## 2019-12-03 DIAGNOSIS — M62.81 MUSCLE WEAKNESS (GENERALIZED): ICD-10-CM

## 2019-12-03 DIAGNOSIS — R26.9 ABNORMALITY OF GAIT AND MOBILITY: ICD-10-CM

## 2019-12-03 PROCEDURE — 97163 PT EVAL HIGH COMPLEX 45 MIN: CPT

## 2019-12-03 PROCEDURE — 97116 GAIT TRAINING THERAPY: CPT

## 2019-12-03 NOTE — PLAN OF CARE
[facility]  PHYSICAL THERAPY EVALUATION / PLAN OF CARE    Name: Storm Hinds  Clinic Number: [id number]    Diagnosis:   Encounter Diagnoses   Name Primary?    Muscle weakness (generalized)     Abnormality of gait and mobility      Physician: Elliott Wasserman MD  Treatment Orders: PT Eval and Treat  Past Medical History:   Diagnosis Date    Acid reflux        Evaluation Date: 12/03/19  Visit # authorized: 12  Authorization period: 12/31/19  Plan of care Expiration: 2/28/20  MD referral: eval and treat    History   Precautions: fall/safety  Medical Diagnosis: multilevel neural foraminal stenosis, gait abnormality  PT Diagnosis: weakness, gait and movement abnormality  History of Present Illness: Storm is a 39 y.o. male that presents to [facility] secondary to what presents as an undiagnosed MND. He is in the process of medical testing to determine cause of his symptoms and course of treatment. Thus far he has had MRI of brain and thoracic/lumbar/cervical spines, EMG and NCS.  Prior Therapy: none for current diagnosis  Nutrition:  Normal  Chief complaint: weakness, gait instability, tremors  Social History: patient lives with his wife and children  Place of Residence (Steps/Adaptations): single story home with 2 steps to enter, walk in shower with grab bars, no seat or shower chair  Previous functional status includes: patient was independent with ADL's and IADL's and was employed full time without restriction or limitations  Current functional status:  Patient is unable to work in his current field, has difficulty performing ADL's, and significant gait instability with increased fall risk  Exercise routine prior to onset: none  DME owned: [employer] (borrowed from acquaintance)  DME recommended: RW, shower chair, wheelchair  Work:  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 He reports symptoms present since september 16th which have gotten progressively worse. He has been to several doctors and is in the process of testing but he still does not know what is wrong with him. He reports significant difficulty performing ADL's and ambulating.     Pain:   Back 5/10, leg 4/10 (can get up to 9/10)  Low back and left leg from the knee down, achilles feels tight, Intermittent numbness in left hand and foot    Objective   Mental status: alert and oriented  - Follows commands: 100% of time   - Speech: no deficits however he speaks through clenched jaw  - Mood/behavior: calm, behavior appropriate to situation     Posture Alignment :slouched posture  Postural examination/scapula alignment: Rounded shoulder, Head forward, Slouched posture and decreased WB through LLE  Joint integrity: intact throughout  Edema: none noted    Sensation: Light Touch: Intact       Proprioception:   Intact  - appearance: well groomed     ROM:   UPPER EXTREMITY--AROM/PROM  (R) UE: WFLs  (L) UE: WFLs         RANGE OF MOTION--LOWER EXTREMITIES  RANGE OF MOTION--LOWER EXTREMITIES   (R) LE: WFLs   Hamstrings Length:moderate restriction  Ankle Dorsiflexion: WFLs     (L) LE: limited as follows: decreased A/PROM secondary to increased muscle tone present  Hamstrings Length: significant restriction  Ankle Dorsiflexion: limited secondary to increased tone with first toe extension and foot eversion posturing    Strength: manual muscle test grades below   Upper Extremity Strength  (R) UE  (L) UE    Shoulder flexion: 5/5 Shoulder flexion: 4+/5   Shoulder Abduction: 5/5 Shoulder abduction: 4+/5   Elbow flexion: 5/5 Elbow flexion: 5/5   Elbow extension: 5/5  Elbow extension: 5/5   Wrist flexion: 5/5 Wrist flexion: 4+/5   Wrist extension: 5/5 Wrist extension: 4+/5    5/5 : 4+/5     Lower Extremity Strength*  Right LE  Left LE    Knee extension: 5/5 Knee extension: 3+/5   Knee flexion: 5/5 Knee flexion: 3-/5   Hip flexion: 5/5 Hip flexion: 2+/5   Hip extension:  5/5 Hip extension: 2-/5   Hip abduction: 5/5 Hip abduction: 2-/5   Hip adduction: 5/5 Hip adduction 3-/5   Ankle dorsiflexion: 5/5 Ankle dorsiflexion: trace   Ankle plantarflexion: 5/5 Ankle plantarflexion: trace   * increased tone with tremors noted bilaterally however greater on left side    Upper abdominals 4/5   Lower abdominals 3+/5   Back extensors 4-/5     Visual/Auditory: tinnitus reported  Tracking:Intact  Acuity:Intact  R/L discrimination: Intact  Visual field: Intact    Coordination:   - fine motor: decreased on left (patient is right hand dominant)  - UE coordination: intact    - LE coordination:  impaired    Functional Mobility (Bed mobility, transfers)  Bed mobility: I  Roll to left: I  Roll to right: I  Supine to prone: I  Scooting to edge of bed: I  Supine to sit: Mod I  Sit to supine: Mod I  Sit to stand:  CGA to min and verbal cueing for technique  Car transfers: Min A    ADL's:  Patient reports he is able to perform his ADL's without assistance however with difficulty and requiring increase time to complete. He has a walk in shower that has several grab bars that he uses to pull himself up with after he sits down on the floor to shower. Shower chair recommended.    Gait Assessment:   - AD used: none at current time  - Assistance: patient uses assist of walls/furniture at home for balance, requires significant assist for community access    GAIT DEVIATIONS:  Storm displays the following deviations with ambulation: unsteady gait with dystonia and inability to weight shift onto left side and weight bear through his LLE. His leg basically collapses causing significant effort and compensation  and increasing risk for falls/injury.     Impairments contributing to deviations: impaired motor control,  impaired balance, impaired coordination, decreased flexibility, abnormal muscle tone, pain and decreased strength    Endurance Deficit: significant impairment in activity tolerance    Evaluation   Timed Up and Go NT   Single Limb Stance R LE 3 sec   Single Limb Stance L LE Unable to perform   Self Selected Walking Speed NT   30 second Chair Rise Unable to perform     Functional Limitations Reports -   Category: mobility  Tool: LEF's  Score: 73.75% impairment (21 points)    TREATMENT:    Patient received gait and transfer training using [employer] as well as RW on level surface in clinic with CGA for balance/safety.     Home Exercises and Patient Education Provided    Education provided re: RW for gait and transfers for energy conservation, joint protection, and decreased fall risk; shower chair  - fall prevention techniques  - progress towards goals   - role of therapy in multi - disciplinary team, goals for therapy  Pt educated on condition, POC, and expectations in therapy.  No spiritual or educational barriers to learning provided    Home exercises: none provided at this visit  Pt will be provided HEP during course of treatment with progressions as appropriate. Pt was advised to perform these exercises free of pain, and to stop performing them if pain occurs.   Storm demonstrated good  understanding of the education provided.     Assessment   This is a 39 y.o. male referred to outpatient physical therapy and presents with a medical diagnosis of insidious inset of progressive weakness and gait instability since September and demonstrates limitations as described in the problem list. Storm's immediate needs are to secure AE/AD to address safe gait and transfers. Patient demonstrates improved gait stability using RW for energy conservation, joint protection, and decrease fall risk. Discussed with patient and spouse who  are both very emotional at this time however verbalized agreement. PT will notify referring MD of assessed equipment needs.  Pt rehab potential is Guarded at this time pending further medical evaluation and work up. Pt will benefit from skilled outpatient Physical Therapy to address the deficits stated above and in the chart below, provide pt/family education, and to maximize pt's level of independence in the home and community environment. .     Anticipated barriers to physical therapy: progressive nature of symptoms    Medical necessity is demonstrated by the following IMPAIRMENTS/PROBLEM LIST:    weakness, impaired endurance, impaired self care skills, impaired functional mobility, gait instability, impaired balance, decreased lower extremity function, pain, abnormal tone, decreased ROM, impaired coordination, impaired fine motor, impaired joint extensibility and impaired muscle length    Pt's spiritual, cultural and educational needs considered and pt agreeable to plan of care and goals as stated below:     GOALS:    Long Term Goals: 6 weeks    Pain: Decrease pain to 3/10 to allow for improved ADL's and functional mobility  Strength: Improve strength in LLE and core muscles to by at least 1/2 muscle grade for improved transfer and gait stability  Flexibility: Improve LLE flexibility/ROM to within functional limits  Endurance: Tolerate 45 min treatment session with no more than 2 rest breaks   Functional scale: Improve score on LEFS to 62.5%   Walking: Increase walking distance and/or duration to > / = 300 ft using RW and SBA with minimal compensation and no LOB   Postures: Increase standing duration to 20 min with AD and no increased pain reported  Transfers: Perform car, sit to stand and supine <> sit transfers with supervision and using safe technique  Exercise: demonstrate independence with home exercise program to maintain gains made in therapy       Plan     Pt will be treated by physical therapy 2 times a  week for 6 weeks for Pt Education, HEP, therapeutic exercises, neuromuscular re-education, joint mobilizations, modalities prn to achieve established goals. Storm may at times be seen by a PTA as part of the Rehab Team.     Cont PT for 6 weeks.     Tye Balbuena, PT 12/5/2019     I certify the need for these services furnished under this plan of treatment and while under my care.  ____________________________________ Physician/Referring Practitioner   Date of Signature

## 2019-12-04 PROBLEM — R26.9 ABNORMALITY OF GAIT AND MOBILITY: Status: ACTIVE | Noted: 2019-12-04

## 2019-12-04 PROBLEM — M62.81 MUSCLE WEAKNESS (GENERALIZED): Status: ACTIVE | Noted: 2019-12-04

## 2019-12-04 NOTE — PROGRESS NOTES
PT met face to face with Lukasz Nunn PTA to discuss patient's treatment plan and progress towards established goals.  Treatment will be continued as described in initial report/eval and progress notes.  Patient will be seen by physical therapist every sixth visit and minimally once per month.    Additional information: n/a

## 2019-12-05 ENCOUNTER — HOSPITAL ENCOUNTER (EMERGENCY)
Facility: HOSPITAL | Age: 39
Discharge: HOME | End: 2019-12-05
Attending: EMERGENCY MEDICINE
Payer: COMMERCIAL

## 2019-12-05 ENCOUNTER — CLINICAL SUPPORT (OUTPATIENT)
Dept: REHABILITATION | Facility: HOSPITAL | Age: 39
End: 2019-12-05
Attending: FAMILY MEDICINE
Payer: COMMERCIAL

## 2019-12-05 VITALS
DIASTOLIC BLOOD PRESSURE: 73 MMHG | OXYGEN SATURATION: 96 % | HEIGHT: 72 IN | BODY MASS INDEX: 29.26 KG/M2 | RESPIRATION RATE: 21 BRPM | TEMPERATURE: 99 F | HEART RATE: 92 BPM | WEIGHT: 216 LBS | SYSTOLIC BLOOD PRESSURE: 115 MMHG

## 2019-12-05 DIAGNOSIS — M62.81 MUSCLE WEAKNESS (GENERALIZED): ICD-10-CM

## 2019-12-05 DIAGNOSIS — R26.9 ABNORMALITY OF GAIT AND MOBILITY: ICD-10-CM

## 2019-12-05 DIAGNOSIS — R11.0 NAUSEA: ICD-10-CM

## 2019-12-05 DIAGNOSIS — R25.2 SPASTICITY: Primary | ICD-10-CM

## 2019-12-05 DIAGNOSIS — G24.9 DYSTONIA: Primary | ICD-10-CM

## 2019-12-05 LAB
ALBUMIN SERPL BCP-MCNC: 4.7 G/DL (ref 3.5–5.2)
ALP SERPL-CCNC: 74 U/L (ref 55–135)
ALT SERPL W/O P-5'-P-CCNC: 46 U/L (ref 10–44)
ANION GAP SERPL CALC-SCNC: 14 MMOL/L (ref 8–16)
APTT BLDCRRT: 32.9 SEC (ref 21–32)
AST SERPL-CCNC: 43 U/L (ref 10–40)
BASOPHILS # BLD AUTO: 0.04 K/UL (ref 0–0.2)
BASOPHILS NFR BLD: 0.4 % (ref 0–1.9)
BILIRUB SERPL-MCNC: 0.8 MG/DL (ref 0.1–1)
BUN SERPL-MCNC: 15 MG/DL (ref 6–20)
CALCIUM SERPL-MCNC: 9 MG/DL (ref 8.7–10.5)
CHLORIDE SERPL-SCNC: 105 MMOL/L (ref 95–110)
CO2 SERPL-SCNC: 18 MMOL/L (ref 23–29)
CREAT SERPL-MCNC: 0.9 MG/DL (ref 0.5–1.4)
DIFFERENTIAL METHOD: ABNORMAL
EOSINOPHIL # BLD AUTO: 0.5 K/UL (ref 0–0.5)
EOSINOPHIL NFR BLD: 4.4 % (ref 0–8)
ERYTHROCYTE [DISTWIDTH] IN BLOOD BY AUTOMATED COUNT: 12.6 % (ref 11.5–14.5)
EST. GFR  (AFRICAN AMERICAN): >60 ML/MIN/1.73 M^2
EST. GFR  (NON AFRICAN AMERICAN): >60 ML/MIN/1.73 M^2
GLUCOSE SERPL-MCNC: 94 MG/DL (ref 70–110)
HCT VFR BLD AUTO: 43.1 % (ref 40–54)
HGB BLD-MCNC: 14.8 G/DL (ref 14–18)
IMM GRANULOCYTES # BLD AUTO: 0.03 K/UL (ref 0–0.04)
IMM GRANULOCYTES NFR BLD AUTO: 0.3 % (ref 0–0.5)
INFLUENZA A, MOLECULAR: NEGATIVE
INFLUENZA B, MOLECULAR: NEGATIVE
INR PPP: 1.2 (ref 0.8–1.2)
LIPASE SERPL-CCNC: 32 U/L (ref 4–60)
LYMPHOCYTES # BLD AUTO: 3.1 K/UL (ref 1–4.8)
LYMPHOCYTES NFR BLD: 29.9 % (ref 18–48)
MCH RBC QN AUTO: 29.8 PG (ref 27–31)
MCHC RBC AUTO-ENTMCNC: 34.3 G/DL (ref 32–36)
MCV RBC AUTO: 87 FL (ref 82–98)
MONOCYTES # BLD AUTO: 0.8 K/UL (ref 0.3–1)
MONOCYTES NFR BLD: 7.4 % (ref 4–15)
NEUTROPHILS # BLD AUTO: 6 K/UL (ref 1.8–7.7)
NEUTROPHILS NFR BLD: 57.6 % (ref 38–73)
NRBC BLD-RTO: 0 /100 WBC
PLATELET # BLD AUTO: 327 K/UL (ref 150–350)
PMV BLD AUTO: 9.1 FL (ref 9.2–12.9)
POCT GLUCOSE: 79 MG/DL (ref 70–110)
POTASSIUM SERPL-SCNC: 4.1 MMOL/L (ref 3.5–5.1)
PROT SERPL-MCNC: 8.1 G/DL (ref 6–8.4)
PROTHROMBIN TIME: 12.7 SEC (ref 9–12.5)
RBC # BLD AUTO: 4.97 M/UL (ref 4.6–6.2)
SODIUM SERPL-SCNC: 137 MMOL/L (ref 136–145)
SPECIMEN SOURCE: NORMAL
TROPONIN I SERPL DL<=0.01 NG/ML-MCNC: <0.01 NG/ML (ref 0.02–0.5)
TSH SERPL DL<=0.005 MIU/L-ACNC: 1.07 UIU/ML (ref 0.34–5.6)
WBC # BLD AUTO: 10.32 K/UL (ref 3.9–12.7)

## 2019-12-05 PROCEDURE — 96361 HYDRATE IV INFUSION ADD-ON: CPT

## 2019-12-05 PROCEDURE — 82962 GLUCOSE BLOOD TEST: CPT

## 2019-12-05 PROCEDURE — 99285 EMERGENCY DEPT VISIT HI MDM: CPT | Mod: 25

## 2019-12-05 PROCEDURE — 93005 ELECTROCARDIOGRAM TRACING: CPT

## 2019-12-05 PROCEDURE — 70450 CT HEAD/BRAIN W/O DYE: CPT | Mod: TC

## 2019-12-05 PROCEDURE — 85610 PROTHROMBIN TIME: CPT

## 2019-12-05 PROCEDURE — 97140 MANUAL THERAPY 1/> REGIONS: CPT

## 2019-12-05 PROCEDURE — 70450 CT HEAD WITHOUT CONTRAST: ICD-10-PCS | Mod: 26,,, | Performed by: RADIOLOGY

## 2019-12-05 PROCEDURE — 85025 COMPLETE CBC W/AUTO DIFF WBC: CPT

## 2019-12-05 PROCEDURE — 70450 CT HEAD/BRAIN W/O DYE: CPT | Mod: 26,,, | Performed by: RADIOLOGY

## 2019-12-05 PROCEDURE — 80053 COMPREHEN METABOLIC PANEL: CPT

## 2019-12-05 PROCEDURE — 85730 THROMBOPLASTIN TIME PARTIAL: CPT

## 2019-12-05 PROCEDURE — 84484 ASSAY OF TROPONIN QUANT: CPT

## 2019-12-05 PROCEDURE — 63600175 PHARM REV CODE 636 W HCPCS: Performed by: FAMILY MEDICINE

## 2019-12-05 PROCEDURE — 84443 ASSAY THYROID STIM HORMONE: CPT

## 2019-12-05 PROCEDURE — 36415 COLL VENOUS BLD VENIPUNCTURE: CPT

## 2019-12-05 PROCEDURE — 97110 THERAPEUTIC EXERCISES: CPT

## 2019-12-05 PROCEDURE — 97116 GAIT TRAINING THERAPY: CPT

## 2019-12-05 PROCEDURE — 96376 TX/PRO/DX INJ SAME DRUG ADON: CPT

## 2019-12-05 PROCEDURE — 87502 INFLUENZA DNA AMP PROBE: CPT

## 2019-12-05 PROCEDURE — 63600175 PHARM REV CODE 636 W HCPCS: Performed by: EMERGENCY MEDICINE

## 2019-12-05 PROCEDURE — 83690 ASSAY OF LIPASE: CPT

## 2019-12-05 PROCEDURE — 96374 THER/PROPH/DIAG INJ IV PUSH: CPT

## 2019-12-05 PROCEDURE — 96375 TX/PRO/DX INJ NEW DRUG ADDON: CPT

## 2019-12-05 RX ORDER — LORAZEPAM 1 MG/1
1 TABLET ORAL EVERY 12 HOURS PRN
Qty: 30 TABLET | Refills: 0 | Status: SHIPPED | OUTPATIENT
Start: 2019-12-05 | End: 2019-12-23 | Stop reason: SDUPTHER

## 2019-12-05 RX ORDER — ONDANSETRON 2 MG/ML
4 INJECTION INTRAMUSCULAR; INTRAVENOUS
Status: COMPLETED | OUTPATIENT
Start: 2019-12-05 | End: 2019-12-05

## 2019-12-05 RX ORDER — LORAZEPAM 2 MG/ML
1 INJECTION INTRAMUSCULAR
Status: COMPLETED | OUTPATIENT
Start: 2019-12-05 | End: 2019-12-05

## 2019-12-05 RX ORDER — LORAZEPAM 2 MG/ML
0.5 INJECTION INTRAMUSCULAR
Status: COMPLETED | OUTPATIENT
Start: 2019-12-05 | End: 2019-12-05

## 2019-12-05 RX ADMIN — SODIUM CHLORIDE 1000 ML: 0.9 INJECTION, SOLUTION INTRAVENOUS at 06:12

## 2019-12-05 RX ADMIN — LORAZEPAM 1 MG: 2 INJECTION INTRAMUSCULAR; INTRAVENOUS at 06:12

## 2019-12-05 RX ADMIN — LORAZEPAM 0.5 MG: 2 INJECTION INTRAMUSCULAR; INTRAVENOUS at 07:12

## 2019-12-05 RX ADMIN — ONDANSETRON 4 MG: 2 INJECTION INTRAMUSCULAR; INTRAVENOUS at 06:12

## 2019-12-05 NOTE — ED TRIAGE NOTES
Escorted from physical therapy by therapist, reports acute onset of diaphoretic, nausea, delay response during first session for undiagnosed episodes of weakness, pt has been follow up with neurology, MRI, spinal test, currently awaiting rule out of parkinson, pt has L sided weakness, increased tone, bp at physical therapy 129/86, currently alert, delay response in speech, tremors, L sided body stiffness, denies pain, pt's wife edward reports weakness since spt 16th 2019

## 2019-12-06 ENCOUNTER — TELEPHONE (OUTPATIENT)
Dept: NEUROLOGY | Facility: CLINIC | Age: 39
End: 2019-12-06

## 2019-12-06 ENCOUNTER — PATIENT MESSAGE (OUTPATIENT)
Dept: FAMILY MEDICINE | Facility: CLINIC | Age: 39
End: 2019-12-06

## 2019-12-06 DIAGNOSIS — G24.9 DYSTONIA: ICD-10-CM

## 2019-12-06 RX ORDER — TRIHEXYPHENIDYL HYDROCHLORIDE 2 MG/1
2 TABLET ORAL
Qty: 120 TABLET | Refills: 11 | Status: ON HOLD | OUTPATIENT
Start: 2019-12-06 | End: 2019-12-11 | Stop reason: HOSPADM

## 2019-12-06 NOTE — ED PROVIDER NOTES
Encounter Date: 12/5/2019       History     Chief Complaint   Patient presents with    Nausea     escorted from physical therapy, staff reports acute onset diaphoretic, nausea during first session, pt is in physical therapy for undiagnosed episodes of weakness,      39-year-old male presents brought in from here in the hospital at the rehab Department where he was receiving physical therapy, the wife states that he seems to be getting worse with increased difficulty speaking he is currently under evaluation and treatment for a progressing neuromuscular disorder he has been seen at [facility] by 2 sets of neurologist including MRI scans of the head in the entire spinal cord he has recently been placed on Artane, baclofen, and Neurontin, he has increased difficulty walking with spasticity of the lower legs and difficulty controlling the arms he denies any headache head trauma blurred vision nausea vomiting he is accompanied by his wife who is the main historian, onset of the problems was in September of this year        Review of patient's allergies indicates:  No Known Allergies  Past Medical History:   Diagnosis Date    Acid reflux     Depression     Muscle spasm     Pain     Tremors of nervous system      History reviewed. No pertinent surgical history.  Family History   Problem Relation Age of Onset    Hypertension Mother     Stroke Mother     Cancer Father     Hypertension Paternal Grandmother     Hypertension Paternal Grandfather      Social History     Tobacco Use    Smoking status: Former Smoker    Smokeless tobacco: Former User   Substance Use Topics    Alcohol use: No     Comment: use to    Drug use: No     Review of Systems   Constitutional: Negative for fever.   HENT: Negative for sore throat.    Respiratory: Negative for shortness of breath.    Cardiovascular: Negative for chest pain.   Gastrointestinal: Negative for nausea.   Genitourinary: Negative for dysuria.   Musculoskeletal:  Positive for gait problem and myalgias. Negative for arthralgias, back pain, joint swelling, neck pain and neck stiffness.   Skin: Negative for rash.   Neurological: Negative for weakness.   Hematological: Does not bruise/bleed easily.   Psychiatric/Behavioral: Negative for agitation, sleep disturbance and suicidal ideas. The patient is nervous/anxious.        Physical Exam     Initial Vitals [12/05/19 1735]   BP Pulse Resp Temp SpO2   (!) 121/93 107 (!) 22 98.5 °F (36.9 °C) 97 %      MAP       --         Physical Exam    Nursing note and vitals reviewed.  Constitutional: He appears well-developed and well-nourished. He is not diaphoretic. No distress.   HENT:   Head: Normocephalic and atraumatic.   Right Ear: External ear normal.   Left Ear: External ear normal.   Nose: Nose normal.   Mouth/Throat: Oropharynx is clear and moist. No oropharyngeal exudate.   Eyes: EOM are normal.   Neck: Normal range of motion. Neck supple. No tracheal deviation present.   Cardiovascular: Normal rate and regular rhythm.   No murmur heard.  Pulmonary/Chest: Breath sounds normal. No stridor. No respiratory distress. He has no rales.   Abdominal: Soft. He exhibits no distension and no mass. There is no tenderness. There is no rebound.   Musculoskeletal: Normal range of motion. He exhibits no edema.   Lymphadenopathy:     He has no cervical adenopathy.   Neurological: He is alert and oriented to person, place, and time.   Patient has some difficulty with speech there is generalized spasticity with 3+ deep tendon reflexes at the knees and ankles bilaterally there is some spasticity the arms however has no pronator drift and can accomplish finger-to-nose bilaterally, patient is tearful   Skin: Skin is warm and dry. Capillary refill takes less than 2 seconds. No pallor.   Psychiatric:   Patient seems sad and anxious and tearful         ED Course   Procedures  Labs Reviewed   APTT - Abnormal; Notable for the following components:        Result Value    aPTT 32.9 (*)     All other components within normal limits   CBC W/ AUTO DIFFERENTIAL - Abnormal; Notable for the following components:    MPV 9.1 (*)     All other components within normal limits   COMPREHENSIVE METABOLIC PANEL - Abnormal; Notable for the following components:    CO2 18 (*)     AST 43 (*)     ALT 46 (*)     All other components within normal limits   PROTIME-INR - Abnormal; Notable for the following components:    Prothrombin Time 12.7 (*)     All other components within normal limits   TROPONIN I - Abnormal; Notable for the following components:    Troponin I <0.01 (*)     All other components within normal limits   INFLUENZA A & B BY MOLECULAR   TSH   LIPASE   POCT GLUCOSE   POCT GLUCOSE MONITORING CONTINUOUS     EKG Readings: (Independently Interpreted)   Initial Reading: No STEMI. Rhythm: Sinus Tachycardia. Heart Rate: 104. Ectopy: No Ectopy. Conduction: Normal. ST Segments: Normal ST Segments. T Waves: Normal.       Imaging Results          CT Head Without Contrast (In process)                  Medical Decision Making:   ED Management:  Patient got improvement after the IV Ativan, I have discussed with him and his wife concerning a low dose of Ativan twice daily during this.  Of uncertain to help with anxiety and some a with spasticity they are aware of the addictive potential                                 Clinical Impression:       ICD-10-CM ICD-9-CM   1. Spasticity R25.2 781.0   2. Nausea R11.0 787.02   3. Muscle weakness (generalized) M62.81 728.87                             Valentin Cheng MD  12/06/19 0057       Valentin Cheng MD  12/06/19 0536

## 2019-12-06 NOTE — PROGRESS NOTES
Physical Therapy Daily Treatment Note   Name: Storm Hinds 1980  MRN: [id number]    Visit Date: 12/5/2019  Visit #: 2 / 12  Authorization period Expiration: 12/31/19    Plan of Care Expiration: 2/28/20  Precautions: fall/safety    Time In: 4:00 pm  Time Out: 5:10 pm  Total 1:1 Treatment Time: 43 min    Treatment Diagnosis:   Encounter Diagnoses   Name Primary?    Muscle weakness (generalized)     Abnormality of gait and mobility      Physician: Elliott Wasserman MD    Subjective   Pt reports: He is not sure he is ready for this but he knows he has to do it.     Pain Scale:  6/10 on VAS currently  Pain Location:  Low back and left leg from the knee down, achilles feels tight, Intermittent numbness in left hand and foot    Objective   Storm received therapeutic exercises to develop strength, ROM, flexibility and posture for 18 minutes including:    Initiated Nustep for reciprocal mobility activity using UE's/LE's, L0, however this was discontinued after the first minute secondary to patient having difficulty performing activity and   reporting increased tightness and pain in his left lower leg (primarily achilles and calf)    UBE with no added resistance 2.5/2.5 min  Seated knee extension x 5 ea  Seated heel/toe raises x 5  Assisted supine heel slides on L x 5  Bridging with assist to stabilize LLE x 5  TKE's x 5 ea    Storm received gait training x 10 min using RW on level surface in clinc with CGA for safety 70 ft x 2. Patient demonstrating step to gait pattern with stiff guarded posture, decreased L hip/knee flex during swing phase, decrease LLE WB, decreased weight shift to L, left foot in max evertion and fisrt toe in max extension, steps are discontinuous and labored.    Storm received transfer training using RW and CGA for safety, verbal cueing required for hand placement and safe technique.     Storm received the following manual therapy techniques: stretching and PROM  with tone inhibitory techniques to trunk and LLE for 15  minutes. Patients tone increases with active and passive movement limiting available ROM.     Home Exercises and Education Provided     Education provided re: RW for gait and transfers for energy conservation, joint protection, and decreased fall risk; shower chair  - fall prevention techniques  - progress towards goals   - role of therapy in multi - disciplinary team, goals for therapy  Pt educated on condition, POC, and expectations in therapy.  No spiritual or educational barriers to learning provided    Home exercises:  Pt will be provided HEP during course of treatment with progressions as appropriate. Pt was advised to perform these exercises free of pain, and to stop performing them if pain occurs.   Storm demonstrated good  understanding of the education provided.     Assessment   Storm reported not feeling well at completion of treatment session when ambulating with PT into waiting room where his wife was waiting for him. He became diaphoretic with c/o nausea and feeling fuzzy in his head. At that point he was assisted to chair to rest. He was given a cup of water which he drank, BP taken at that time was 129/86. Patient was alert and oriented however noted to have increased tremors in his upper body and was slow to process and respond to questions. PT asked spouse if he has had any similar episode like this to which she responded no. PT recommended that he go to the ER for evaluation, spouse in agreement. He was assisted to wheelchair and transported to ER. PT stayed with patient and spouse until he was brought to the back for assessment.    Pt prognosis is Guarded at this time pending further medical evaluation and work up. Pt will continue to benefit from skilled outpatient physical therapy to address the deficits listed in the problem list chart on initial evaluation, provide pt/family education and to maximize pt's level of independence in the home  and community environment.     Medical necessity is demonstrated by the impairments and functional limitations listed on the Initial Evaluation.     Anticipated barriers to physical therapy: progressive/aggressive nature of symptoms  Pt's spiritual, cultural and educational needs considered and pt agreeable to plan of care and goals.    Plan   Continue with established Plan of Care towards Physical Therapy goals.   Discussed Plan of Care with patient: Yes    Tye Balbuena, PT  12/6/2019

## 2019-12-06 NOTE — TELEPHONE ENCOUNTER
Symptoms worsening, will try to increase trihexyphenidyl and look for urgent appt with me and Dr. Jackson, potentially 12/11/19.  Patient and wife have my contact information for any issues over the weekend.  Encouraged to use ER if needed for spasticity symptoms, but recently went in and received some lorazepam.    [facility] [place], [place] MD Marisa  PGY4, Neurology  Pager:  [phone]

## 2019-12-06 NOTE — ED NOTES
Pt awake and alert. Pt not speaking. resp e/u. Skin wdp. sr up x2. Wife at bs. Awaiting dispo. Will continue to monitor.

## 2019-12-06 NOTE — DISCHARGE INSTRUCTIONS
You are to follow up with your neurologist use the Ativan as directed in addition to current medicines

## 2019-12-06 NOTE — TELEPHONE ENCOUNTER
----- Message from Niki Cunningham MD sent at 12/6/2019  2:32 PM CST -----  This patient was seen initially by Dr. Jackson with Dr. Jones--has a dystonic LLE.  Has worsening symptoms and would like an urgent appointment.  Is there anyway Dr. Jackson would be able to staff a clinic visit with me for this patient on December 11th?  I see he has a 1 pm patient currently for that day, hopefully this one would just be checking in and adjusting medications.    Thanks!  Niki

## 2019-12-09 ENCOUNTER — HOSPITAL ENCOUNTER (INPATIENT)
Facility: HOSPITAL | Age: 39
LOS: 3 days | Discharge: HOME | DRG: 092 | End: 2019-12-12
Attending: EMERGENCY MEDICINE | Admitting: INTERNAL MEDICINE
Payer: COMMERCIAL

## 2019-12-09 DIAGNOSIS — R26.9 ABNORMALITY OF GAIT AND MOBILITY: ICD-10-CM

## 2019-12-09 DIAGNOSIS — R25.9 ABNORMAL INVOLUNTARY MOVEMENTS: ICD-10-CM

## 2019-12-09 DIAGNOSIS — R25.2 SPASTICITY: ICD-10-CM

## 2019-12-09 DIAGNOSIS — R13.10 DYSPHAGIA, UNSPECIFIED TYPE: ICD-10-CM

## 2019-12-09 DIAGNOSIS — R47.9 DIFFICULTY WITH SPEECH: ICD-10-CM

## 2019-12-09 DIAGNOSIS — G24.9 DYSTONIA: ICD-10-CM

## 2019-12-09 DIAGNOSIS — Z01.89 ENCOUNTER FOR LUMBAR PUNCTURE: ICD-10-CM

## 2019-12-09 DIAGNOSIS — R13.12 OROPHARYNGEAL DYSPHAGIA: ICD-10-CM

## 2019-12-09 DIAGNOSIS — R07.9 CHEST PAIN, UNSPECIFIED TYPE: ICD-10-CM

## 2019-12-09 DIAGNOSIS — R13.10 DYSPHAGIA: ICD-10-CM

## 2019-12-09 DIAGNOSIS — R07.9 CHEST PAIN: ICD-10-CM

## 2019-12-09 DIAGNOSIS — G62.9 POLYNEUROPATHY: Primary | ICD-10-CM

## 2019-12-09 LAB
ALBUMIN SERPL BCP-MCNC: 4.1 G/DL (ref 3.5–5.2)
ALP SERPL-CCNC: 81 U/L (ref 55–135)
ALT SERPL W/O P-5'-P-CCNC: 29 U/L (ref 10–44)
ANION GAP SERPL CALC-SCNC: 10 MMOL/L (ref 8–16)
AST SERPL-CCNC: 25 U/L (ref 10–40)
BASOPHILS # BLD AUTO: 0.03 K/UL (ref 0–0.2)
BASOPHILS NFR BLD: 0.4 % (ref 0–1.9)
BILIRUB SERPL-MCNC: 0.5 MG/DL (ref 0.1–1)
BILIRUB UR QL STRIP: NEGATIVE
BNP SERPL-MCNC: <10 PG/ML (ref 0–99)
BUN SERPL-MCNC: 14 MG/DL (ref 6–20)
CALCIUM SERPL-MCNC: 9.6 MG/DL (ref 8.7–10.5)
CHLORIDE SERPL-SCNC: 105 MMOL/L (ref 95–110)
CLARITY UR REFRACT.AUTO: CLEAR
CO2 SERPL-SCNC: 25 MMOL/L (ref 23–29)
COLOR UR AUTO: YELLOW
CREAT SERPL-MCNC: 0.9 MG/DL (ref 0.5–1.4)
DIFFERENTIAL METHOD: ABNORMAL
EOSINOPHIL # BLD AUTO: 0.4 K/UL (ref 0–0.5)
EOSINOPHIL NFR BLD: 4.6 % (ref 0–8)
ERYTHROCYTE [DISTWIDTH] IN BLOOD BY AUTOMATED COUNT: 12.8 % (ref 11.5–14.5)
EST. GFR  (AFRICAN AMERICAN): >60 ML/MIN/1.73 M^2
EST. GFR  (NON AFRICAN AMERICAN): >60 ML/MIN/1.73 M^2
GLUCOSE SERPL-MCNC: 112 MG/DL (ref 70–110)
GLUCOSE UR QL STRIP: NEGATIVE
HCT VFR BLD AUTO: 40.9 % (ref 40–54)
HGB BLD-MCNC: 13.9 G/DL (ref 14–18)
HGB UR QL STRIP: NEGATIVE
IMM GRANULOCYTES # BLD AUTO: 0.03 K/UL (ref 0–0.04)
IMM GRANULOCYTES NFR BLD AUTO: 0.4 % (ref 0–0.5)
KETONES UR QL STRIP: NEGATIVE
LEUKOCYTE ESTERASE UR QL STRIP: NEGATIVE
LYMPHOCYTES # BLD AUTO: 2.1 K/UL (ref 1–4.8)
LYMPHOCYTES NFR BLD: 25.2 % (ref 18–48)
MCH RBC QN AUTO: 29.4 PG (ref 27–31)
MCHC RBC AUTO-ENTMCNC: 34 G/DL (ref 32–36)
MCV RBC AUTO: 87 FL (ref 82–98)
MONOCYTES # BLD AUTO: 0.6 K/UL (ref 0.3–1)
MONOCYTES NFR BLD: 7.2 % (ref 4–15)
NEUTROPHILS # BLD AUTO: 5.1 K/UL (ref 1.8–7.7)
NEUTROPHILS NFR BLD: 62.2 % (ref 38–73)
NITRITE UR QL STRIP: NEGATIVE
NRBC BLD-RTO: 0 /100 WBC
PH UR STRIP: 7 [PH] (ref 5–8)
PLATELET # BLD AUTO: 284 K/UL (ref 150–350)
PMV BLD AUTO: 9.5 FL (ref 9.2–12.9)
POTASSIUM SERPL-SCNC: 3.9 MMOL/L (ref 3.5–5.1)
PROT SERPL-MCNC: 7.6 G/DL (ref 6–8.4)
PROT UR QL STRIP: NEGATIVE
RBC # BLD AUTO: 4.72 M/UL (ref 4.6–6.2)
SODIUM SERPL-SCNC: 140 MMOL/L (ref 136–145)
SP GR UR STRIP: 1.01 (ref 1–1.03)
TROPONIN I SERPL DL<=0.01 NG/ML-MCNC: <0.006 NG/ML (ref 0–0.03)
URN SPEC COLLECT METH UR: NORMAL
WBC # BLD AUTO: 8.18 K/UL (ref 3.9–12.7)

## 2019-12-09 PROCEDURE — 25500020 PHARM REV CODE 255: Performed by: INTERNAL MEDICINE

## 2019-12-09 PROCEDURE — 81003 URINALYSIS AUTO W/O SCOPE: CPT

## 2019-12-09 PROCEDURE — 93005 ELECTROCARDIOGRAM TRACING: CPT

## 2019-12-09 PROCEDURE — 99222 1ST HOSP IP/OBS MODERATE 55: CPT | Mod: ,,, | Performed by: PSYCHIATRY & NEUROLOGY

## 2019-12-09 PROCEDURE — A9585 GADOBUTROL INJECTION: HCPCS | Performed by: INTERNAL MEDICINE

## 2019-12-09 PROCEDURE — 11000001 HC ACUTE MED/SURG PRIVATE ROOM

## 2019-12-09 PROCEDURE — 25000003 PHARM REV CODE 250: Performed by: STUDENT IN AN ORGANIZED HEALTH CARE EDUCATION/TRAINING PROGRAM

## 2019-12-09 PROCEDURE — 99285 PR EMERGENCY DEPT VISIT,LEVEL V: ICD-10-PCS | Mod: ,,, | Performed by: EMERGENCY MEDICINE

## 2019-12-09 PROCEDURE — 96361 HYDRATE IV INFUSION ADD-ON: CPT

## 2019-12-09 PROCEDURE — 63600175 PHARM REV CODE 636 W HCPCS: Performed by: STUDENT IN AN ORGANIZED HEALTH CARE EDUCATION/TRAINING PROGRAM

## 2019-12-09 PROCEDURE — 99223 PR INITIAL HOSPITAL CARE,LEVL III: ICD-10-PCS | Mod: ,,, | Performed by: INTERNAL MEDICINE

## 2019-12-09 PROCEDURE — 84484 ASSAY OF TROPONIN QUANT: CPT

## 2019-12-09 PROCEDURE — 80053 COMPREHEN METABOLIC PANEL: CPT

## 2019-12-09 PROCEDURE — 99285 EMERGENCY DEPT VISIT HI MDM: CPT | Mod: ,,, | Performed by: EMERGENCY MEDICINE

## 2019-12-09 PROCEDURE — 99223 1ST HOSP IP/OBS HIGH 75: CPT | Mod: ,,, | Performed by: INTERNAL MEDICINE

## 2019-12-09 PROCEDURE — 96374 THER/PROPH/DIAG INJ IV PUSH: CPT

## 2019-12-09 PROCEDURE — 99285 EMERGENCY DEPT VISIT HI MDM: CPT | Mod: 25

## 2019-12-09 PROCEDURE — 99222 PR INITIAL HOSPITAL CARE,LEVL II: ICD-10-PCS | Mod: ,,, | Performed by: PSYCHIATRY & NEUROLOGY

## 2019-12-09 PROCEDURE — 85025 COMPLETE CBC W/AUTO DIFF WBC: CPT

## 2019-12-09 PROCEDURE — 93010 ELECTROCARDIOGRAM REPORT: CPT | Mod: ,,, | Performed by: INTERNAL MEDICINE

## 2019-12-09 PROCEDURE — 93010 EKG 12-LEAD: ICD-10-PCS | Mod: ,,, | Performed by: INTERNAL MEDICINE

## 2019-12-09 PROCEDURE — 83880 ASSAY OF NATRIURETIC PEPTIDE: CPT

## 2019-12-09 RX ORDER — LORAZEPAM 1 MG/1
1 TABLET ORAL EVERY 12 HOURS PRN
Status: DISCONTINUED | OUTPATIENT
Start: 2019-12-09 | End: 2019-12-12 | Stop reason: HOSPADM

## 2019-12-09 RX ORDER — TRIHEXYPHENIDYL HYDROCHLORIDE 2 MG/1
2 TABLET ORAL
Status: DISCONTINUED | OUTPATIENT
Start: 2019-12-09 | End: 2019-12-10 | Stop reason: DRUGHIGH

## 2019-12-09 RX ORDER — CARBIDOPA AND LEVODOPA 25; 100 MG/1; MG/1
1 TABLET ORAL 3 TIMES DAILY
Status: DISCONTINUED | OUTPATIENT
Start: 2019-12-09 | End: 2019-12-12 | Stop reason: HOSPADM

## 2019-12-09 RX ORDER — GABAPENTIN 300 MG/1
300 CAPSULE ORAL DAILY
Status: DISCONTINUED | OUTPATIENT
Start: 2019-12-10 | End: 2019-12-12 | Stop reason: HOSPADM

## 2019-12-09 RX ORDER — HEPARIN SODIUM 5000 [USP'U]/ML
5000 INJECTION, SOLUTION INTRAVENOUS; SUBCUTANEOUS EVERY 8 HOURS
Status: DISCONTINUED | OUTPATIENT
Start: 2019-12-09 | End: 2019-12-09

## 2019-12-09 RX ORDER — LORAZEPAM 2 MG/ML
1 INJECTION INTRAMUSCULAR
Status: COMPLETED | OUTPATIENT
Start: 2019-12-09 | End: 2019-12-09

## 2019-12-09 RX ORDER — IBUPROFEN 200 MG
16 TABLET ORAL
Status: DISCONTINUED | OUTPATIENT
Start: 2019-12-09 | End: 2019-12-12 | Stop reason: HOSPADM

## 2019-12-09 RX ORDER — IBUPROFEN 200 MG
24 TABLET ORAL
Status: DISCONTINUED | OUTPATIENT
Start: 2019-12-09 | End: 2019-12-12 | Stop reason: HOSPADM

## 2019-12-09 RX ORDER — MORPHINE SULFATE 2 MG/ML
1 INJECTION, SOLUTION INTRAMUSCULAR; INTRAVENOUS EVERY 6 HOURS PRN
Status: DISCONTINUED | OUTPATIENT
Start: 2019-12-09 | End: 2019-12-12 | Stop reason: HOSPADM

## 2019-12-09 RX ORDER — DICLOFENAC SODIUM 10 MG/G
2 GEL TOPICAL DAILY
Status: DISCONTINUED | OUTPATIENT
Start: 2019-12-10 | End: 2019-12-12 | Stop reason: HOSPADM

## 2019-12-09 RX ORDER — GADOBUTROL 604.72 MG/ML
10 INJECTION INTRAVENOUS
Status: COMPLETED | OUTPATIENT
Start: 2019-12-09 | End: 2019-12-09

## 2019-12-09 RX ORDER — MORPHINE SULFATE 2 MG/ML
1 INJECTION, SOLUTION INTRAMUSCULAR; INTRAVENOUS EVERY 6 HOURS PRN
Status: DISCONTINUED | OUTPATIENT
Start: 2019-12-09 | End: 2019-12-09

## 2019-12-09 RX ORDER — TRIHEXYPHENIDYL HYDROCHLORIDE 2 MG/1
2 TABLET ORAL
Status: DISCONTINUED | OUTPATIENT
Start: 2019-12-09 | End: 2019-12-09

## 2019-12-09 RX ORDER — TRIHEXYPHENIDYL HYDROCHLORIDE 2 MG/1
4 TABLET ORAL
Status: DISCONTINUED | OUTPATIENT
Start: 2019-12-10 | End: 2019-12-10 | Stop reason: DRUGHIGH

## 2019-12-09 RX ORDER — GLUCAGON 1 MG
1 KIT INJECTION
Status: DISCONTINUED | OUTPATIENT
Start: 2019-12-09 | End: 2019-12-12 | Stop reason: HOSPADM

## 2019-12-09 RX ORDER — SODIUM CHLORIDE 0.9 % (FLUSH) 0.9 %
10 SYRINGE (ML) INJECTION
Status: DISCONTINUED | OUTPATIENT
Start: 2019-12-09 | End: 2019-12-12 | Stop reason: HOSPADM

## 2019-12-09 RX ORDER — FAMOTIDINE 20 MG/1
20 TABLET, FILM COATED ORAL 2 TIMES DAILY
Status: DISCONTINUED | OUTPATIENT
Start: 2019-12-09 | End: 2019-12-12 | Stop reason: HOSPADM

## 2019-12-09 RX ORDER — BUPROPION HYDROCHLORIDE 150 MG/1
150 TABLET ORAL DAILY
Status: DISCONTINUED | OUTPATIENT
Start: 2019-12-10 | End: 2019-12-12 | Stop reason: HOSPADM

## 2019-12-09 RX ADMIN — SODIUM CHLORIDE 1000 ML: 0.9 INJECTION, SOLUTION INTRAVENOUS at 08:12

## 2019-12-09 RX ADMIN — BACLOFEN 5 MG: 10 TABLET ORAL at 09:12

## 2019-12-09 RX ADMIN — FAMOTIDINE 20 MG: 20 TABLET ORAL at 09:12

## 2019-12-09 RX ADMIN — LORAZEPAM 1 MG: 2 INJECTION INTRAMUSCULAR; INTRAVENOUS at 01:12

## 2019-12-09 RX ADMIN — GADOBUTROL 10 ML: 604.72 INJECTION INTRAVENOUS at 10:12

## 2019-12-09 RX ADMIN — TRIHEXYPHENIDYL HYDROCHLORIDE 2 MG: 2 TABLET ORAL at 09:12

## 2019-12-09 RX ADMIN — CARBIDOPA AND LEVODOPA 1 TABLET: 25; 100 TABLET ORAL at 09:12

## 2019-12-09 RX ADMIN — SODIUM CHLORIDE, SODIUM LACTATE, POTASSIUM CHLORIDE, AND CALCIUM CHLORIDE 500 ML: .6; .31; .03; .02 INJECTION, SOLUTION INTRAVENOUS at 01:12

## 2019-12-09 NOTE — ED NOTES
Tele box [id number] applied to pt. Vanda in war room states able to see pt on monitor, rhythm NSR with HR 87.

## 2019-12-09 NOTE — ASSESSMENT & PLAN NOTE
Young otherwise healthy M with progressive spasticity in LLE (more noticeable x3 months), painful muscle spasms, generalized tremors, with progressive worsening, now involving motor component of speech and swallowing. Family Hx positive for cancer, and patient has significant Hx of smoking and drinking. Unfortunately FIONA scan was not obtained due to high co-pay, MRI of Brain and C/L spine obtained in October without any finding that could justify the severity of symptoms. Differential diagnosis are including but not limited to early-onset atypical PD, PERM syndrome (considering progressive rigidity and brainstem dysfunction), partial SPS, and paraneoplastic SPS. The fact that he has mixed findings on exam and has responded to both Artane and benzodiazepines makes the diagnosis challenging.    - Please increase trihexyphenidyl to 5 mg BID (if available)  - Please started on Sinemet 25 -100 mg TID, will adjust based on response and/or possible side effects  - Please obtain ESR, CRP, TSH, Anti-TPO, and paraneoplastic panel in serum (includes Anti-RADHA and Anti-amphiphysin antibodies)  - Recommend swallow evaluation by SLP   - Recommend obtaining respiratory mechanics (VC + NIF)

## 2019-12-09 NOTE — SUBJECTIVE & OBJECTIVE
Past Medical History:   Diagnosis Date    Acid reflux     Depression     Muscle spasm     Pain     Tremors of nervous system        History reviewed. No pertinent surgical history.    Review of patient's allergies indicates:  No Known Allergies    Current Neurological Medications: Baclofen, Bupropion, Gabapentin, Trihexiphenidyl    No current facility-administered medications on file prior to encounter.      Current Outpatient Medications on File Prior to Encounter   Medication Sig    baclofen (LIORESAL) 5 mg Tab tablet Take 1 tablet (5 mg total) by mouth 3 (three) times daily.    buPROPion (WELLBUTRIN XL) 150 MG TB24 tablet Take 1 tablet (150 mg total) by mouth once daily.    diclofenac sodium (VOLTAREN) 1 % Gel Apply 2 g topically 3 (three) times daily as needed.    gabapentin (NEURONTIN) 300 MG capsule TAKE 1 CAPSULE BY MOUTH ONCE DAILY IN THE EVENING    LORazepam (ATIVAN) 1 MG tablet Take 1 tablet (1 mg total) by mouth every 12 (twelve) hours as needed for Anxiety.    ranitidine (ZANTAC) 300 MG tablet Take 1 tablet (300 mg total) by mouth every evening.    trihexyphenidyl (ARTANE) 2 MG tablet Take 1 tablet (2 mg total) by mouth 4 (four) times daily with meals and nightly. (2 mg three times daily x 1 wk, if well-tolerated increase)     Family History     Problem Relation (Age of Onset)    Cancer Father    Hypertension Mother, Paternal Grandmother, Paternal Grandfather    Stroke Mother        Tobacco Use    Smoking status: Former Smoker    Smokeless tobacco: Former User   Substance and Sexual Activity    Alcohol use: No     Comment: use to    Drug use: No    Sexual activity: Not on file     Review of Systems   Constitutional: Positive for activity change and diaphoresis. Negative for appetite change, chills, fever and unexpected weight change.   HENT: Positive for trouble swallowing. Negative for drooling.    Eyes: Positive for visual disturbance. Negative for photophobia.   Respiratory: Negative  for choking and shortness of breath.    Cardiovascular: Negative for chest pain and palpitations.   Gastrointestinal: Negative for abdominal pain, blood in stool, constipation, diarrhea, nausea and vomiting.   Genitourinary: Positive for difficulty urinating. Negative for frequency and hematuria.   Musculoskeletal: Positive for arthralgias, gait problem, myalgias and neck pain. Negative for joint swelling and neck stiffness.   Skin: Negative for color change and rash.   Allergic/Immunologic: Negative for immunocompromised state.   Neurological: Positive for tremors, speech difficulty, weakness and numbness. Negative for dizziness, seizures, facial asymmetry and headaches.   Psychiatric/Behavioral: Positive for sleep disturbance. Negative for agitation, confusion and hallucinations.     Objective:     Vital Signs (Most Recent):  Temp: 97.7 °F (36.5 °C) (12/09/19 1204)  Pulse: 100 (12/09/19 1315)  Resp: 15 (12/09/19 1315)  BP: (!) 123/58 (12/09/19 1315)  SpO2: 95 % (12/09/19 1315) Vital Signs (24h Range):  Temp:  [97.7 °F (36.5 °C)] 97.7 °F (36.5 °C)  Pulse:  [] 100  Resp:  [13-18] 15  SpO2:  [95 %-97 %] 95 %  BP: (123-140)/(58-76) 123/58     Weight: 95.3 kg (210 lb)  Body mass index is 28.48 kg/m².    Physical Exam   Constitutional: He appears well-developed and well-nourished. He is cooperative. No distress.   Young male with mild distress when talking   HENT:   Head: Normocephalic and atraumatic.   Eyes: Pupils are equal, round, and reactive to light. EOM are normal.   Neck: Normal range of motion. Muscular tenderness present. No neck rigidity. Normal range of motion present.   Cardiovascular: Normal rate.   Pulmonary/Chest: Effort normal. No tachypnea. No respiratory distress.   Mildly short of breath with exertion during physical examination   Abdominal: Soft. He exhibits no distension. There is no tenderness.   Musculoskeletal: He exhibits deformity. He exhibits no edema.   Neurological: He is alert. He  displays tremor. He displays no seizure activity.   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 No rash noted. He is not diaphoretic. No erythema.   Psychiatric: He has a normal mood and affect. His behavior is normal. His speech is slurred. He is not actively hallucinating.   Vitals reviewed.      NEUROLOGICAL EXAMINATION:     MENTAL STATUS   Speech: slurred     CRANIAL NERVES     CN III, IV, VI   Pupils are equal, round, and reactive to light.  Extraocular motions are normal.       Significant Labs:   CBC:   Recent Labs   Lab 12/09/19  1251   WBC 8.18   HGB 13.9*   HCT 40.9        CMP:   Recent Labs   Lab 12/09/19  1251   *      K 3.9      CO2 25   BUN 14   CREATININE 0.9   CALCIUM 9.6   PROT 7.6   ALBUMIN 4.1   BILITOT 0.5   ALKPHOS 81   AST 25   ALT 29   ANIONGAP 10   EGFRNONAA >60.0       Significant Imaging:     MRI C spine w/o contrast (09/23/19):  1.  Multilevel degenerative disc disease from C3-C5 resulting in moderate bilateral neural foraminal narrowing.  2. Degenerative disc disease at C5-C6 resulting in mild bilateral neural foraminal narrowing.  3. Degenerative disc disease at C6-C7 resulting in mild central spinal canal stenosis with severe narrowing of the right neural foramen and moderate narrowing the left neural foramen.     MRI L spine w/o contrast (09/23/19):  1. Sacralization of L5.  2. Grade 1 retrolisthesis of L4 on L5.  3. Degenerative disc disease at L4-L5 with a small central focal disc protrusion resulting in mild central spinal canal stenosis with mild bilateral neural foraminal narrowing.  4. Signal abnormality within the posterior central L4-L5 disc consistent with a small annular fissure.     MRI T spine w/o contrast (10/02/19):  Essentially normal MRI of the thoracic spine.     MRI Brain w/ w/o contrast (10/02/19):  No acute intracranial process. Normal appearing MRI of the Brain.

## 2019-12-09 NOTE — ASSESSMENT & PLAN NOTE
The patient is a 39 year old male with past medical history of smoking, chronic low back pain, HLD, recent LLE dystonia, presenting for worsening expressive aphasia for 4 days. The patient had first onset of LLE dystonia in 9/2019, now progressing with spasticity, with acute onset of aphasia this past Thursday proma. The patient has seen multiple neurologists, trying ropinirole without improvement, and has recently started trihexyphenidyl after 11/11 neurology appt. Patient possibly may have responded well to artane. Patient with smoking history, [place] travel with tick seen on clothes, father had leukemia. No neurological illness in family. He was not able to afford FIONA scan suggested by neurologist. Neurology consulted, differential including PD, PERM syndrome, partial SPS, paraneoplastic SPS.    PLAN  -neurology following, recs appreciated  -Sinemet  TID ordered  -trihexylphenidyl 6mg with breakfast, 4mg with lunch and dinner per available pharmacy dosing  -ESR, CRP, TSH, Anti-TPO, paraneoplastic panel w Anti-RADHA and Anti-amphiphysin Ab  -SLP consult, NPO until evaluated for oropharyngieal dysphagia  -VC and NIF resp mechanics ordered  -PT/OT for gait  -patient my be candidate for botox for LLE dystonia in future

## 2019-12-09 NOTE — HPI
The patient is a 38 y/o M with no significant PMHx except recent-onset dystonia of LLE, presenting to the ED with worsening of symptoms.    The history is taken from his spouse at the bedside and the patient is minimally contributing due to difficulty with speech. They both remember the acute onset of pain and muscle spasm in the left lower extremity on September 16th when they were attending their son's football game.  However inquiring about previous symptoms, they both admit to several falls within the few months prior due to his ankle rotating externally (possibly unnoticed footdrop). Since September 16th his symptoms have been progressively worsening to the point that he is ambulating with a walker now. They also report difficulty swallowing solid food, tremors in distal extremities, intermittent sweating, lightheadedness, and blurred vision. He denies any accidents with bladder or bowels, however reports intermittent difficulty with urination. They also report a recent-onset difficulty with speech which he describes as having to physically fight to get the words out (against clenched jaw), but sometimes has difficulty finding the words as well.  Wife mentions a few episodes of forgetting things within 2 minutes of being told. Patient was started on baclofen and tizanidine with moderate response however could not continue due to drowsiness. He was seen in the Neurology Clinic at [facility] [place] on 11/11, evaluated by Doctors Marisa, Robert, and Manuel. He was started on Artane due to presumed early-onset Parkinson's disease which he responded well to up until last week, when he was started on Ativan due to worsening of symptoms which helped. that he has been worsening overall.  He was planned to CT at that scan as outpatient ([facility]), however that was not obtained due to high co-pay.     Patient is an , works outside, travels to [place] frequently, he mentions that he has seen tics on his clothes but  hasn't noticed being bitten by them.  He is a former smoker (3x13 PPD), and former heavy drinker (a case of beer daily x10 years), quit both 10 years ago.  He denies family history of Parkinson's disease but reports leukemia in father and prostate cancer in grandfather.

## 2019-12-09 NOTE — ASSESSMENT & PLAN NOTE
Secondary to dystonic LLE  Ambulates with walker at home    - PT/OT to evaluate and treat  - Might benefit from Botox injection in the future

## 2019-12-09 NOTE — ASSESSMENT & PLAN NOTE
Insidious-onset, progressive, to solids only  Likely secondary to abnormal laryngeal motor coordination/strength     - Recommend SLP evaluation

## 2019-12-09 NOTE — ED PROVIDER NOTES
Encounter Date: 12/9/2019       History     Chief Complaint   Patient presents with    Altered Mental Status     Pt c/o confusion since Thursday with gradual worsening.  Pt also c/o left leg pain & weakness.  Pt was seen on Thursday at [facility] for same.      HPI   38 yo M hx of depression, reflex, progressive spasticity and muscle pain currently being worked up for neuromuscular disease with neurology, presenting with worsening dysarthria, worsening leg pain and some L sided chest pain since Thursday (4 days). He is not disoriented or altered, however he is having worsening expressive aphasia with difficulty getting words out. Wife accompanies him today and helps provide history. His L sided chest pain is intermittent, non radiating, not associated with certain activities and not relieved with rest. He has cramping to his L calf/ankle/foot that has been ongoing prior to the last few days. He also reports that over the past few days he has to eat and drink sitting straight upright as otherwise he feels like he will aspirate. Denies fever/chills, shortness of breath or cough.     Review of patient's allergies indicates:  No Known Allergies  Past Medical History:   Diagnosis Date    Acid reflux     Depression     Muscle spasm     Pain     Tremors of nervous system      History reviewed. No pertinent surgical history.  Family History   Problem Relation Age of Onset    Hypertension Mother     Stroke Mother     Cancer Father     Hypertension Paternal Grandmother     Hypertension Paternal Grandfather      Social History     Tobacco Use    Smoking status: Former Smoker    Smokeless tobacco: Former User   Substance Use Topics    Alcohol use: No     Comment: use to    Drug use: No     Review of Systems   Constitutional: Positive for activity change. Negative for fatigue and fever.   HENT: Negative for congestion and rhinorrhea.    Eyes: Negative for photophobia and visual disturbance.   Respiratory: Positive  for chest tightness. Negative for cough, shortness of breath and wheezing.    Cardiovascular: Positive for chest pain. Negative for palpitations and leg swelling.   Gastrointestinal: Negative for abdominal distention, abdominal pain, diarrhea, nausea and vomiting.   Genitourinary: Negative for difficulty urinating, frequency and hematuria.   Musculoskeletal: Positive for gait problem.   Skin: Negative for color change and rash.   Neurological: Positive for speech difficulty and weakness. Negative for dizziness, facial asymmetry and light-headedness.   Psychiatric/Behavioral: The patient is nervous/anxious.        Physical Exam     Initial Vitals [12/09/19 1204]   BP Pulse Resp Temp SpO2   (!) 140/76 (!) 118 18 97.7 °F (36.5 °C) 97 %      MAP       --         Physical Exam    Nursing note and vitals reviewed.  Constitutional: He appears well-developed and well-nourished. He is not diaphoretic. He appears distressed (appears anxious).   HENT:   Head: Normocephalic and atraumatic.   Nose: Nose normal.   Mouth/Throat: No oropharyngeal exudate.   Slightly dry mucous membranes   Eyes: Conjunctivae and EOM are normal.   Cardiovascular: Regular rhythm, normal heart sounds and intact distal pulses.   Tachycardic to 110s   Pulmonary/Chest: Breath sounds normal. No respiratory distress.   Abdominal: Soft. Bowel sounds are normal. He exhibits no distension. There is no tenderness.   Musculoskeletal: He exhibits tenderness. He exhibits no edema.   Neurological: He is alert and oriented to person, place, and time. No cranial nerve deficit. GCS score is 15. GCS eye subscore is 4. GCS verbal subscore is 5. GCS motor subscore is 6.   Expressive aphasia noted, takes a long time for patient to answer questions, uses one-word or short phrases  A&O x 3 and appropriate  L ankle plantar flexed and held in spasm. Unable to actively or passively dorsiflex foot   Distal pulses 2+  Has some rigidity toward the end range of upper extremities  on active ROM  With deep breath has diaphragmatic staccato-ing to end of inspiration. Not present when breathing normally   Skin: Skin is warm and dry. Capillary refill takes less than 2 seconds. No erythema. No pallor.         ED Course   Procedures  Labs Reviewed   CBC W/ AUTO DIFFERENTIAL   COMPREHENSIVE METABOLIC PANEL   TROPONIN I   B-TYPE NATRIURETIC PEPTIDE   URINALYSIS, REFLEX TO URINE CULTURE          Imaging Results    None          Medical Decision Making:   Initial Assessment:   38 yo M undergoing workup for neuromuscular disorder presenting with progression of symptoms to include expressive aphasia, worsening L foot spasm/pain, and some L sided chest pain. Ddx includes worsening of chronic condition- may be ALS, MS, environmental exposure, infectious disease. Patient follows with neurology here. EKG with sinus tachycardia. Troponin negative, BNP < 20. Remainder of labs largely unremarkable.  Provided IVF and some ativan as patient reports this helps with his symptoms. Have consulted neurology who will evaluate the patient.   Olamide Hamilton DO HO-II  2:26 PM    Update: Neurology has evaluated the patient. Recommend admission with formal swallow study and therapy evals, trial of sinemet. Discussed with internal medicine and patient admitted to Dr. Morgan. Patient and his wife are amenable to plan and questions answered.  Olamide Hamilton DO HO-II  3:55 PM                     ED Course as of Dec 09 1422   Mon Dec 09, 2019   1342 Sinus tachycardia, rate 109, no ST changes, no ischemia, normal intervals.  Compared to prior EKG from 12/05/2019, grossly stable without significant change.    [SS]      ED Course User Index  [SS] Jose Cool MD                Clinical Impression:       ICD-10-CM ICD-9-CM   1. Chest pain R07.9 786.50   2. Dysphagia R13.10 787.20                             Olamide Hamilton DO  Resident  12/09/19 6069

## 2019-12-09 NOTE — PLAN OF CARE
12/09/19 1632   Post-Acute Status   Post-Acute Authorization Placement   Post-Acute Placement Status Awaiting Internal Medical Clearance     Patient not medically ready at this time, Post acute needs to be determined. SW will continue to follow up.    Sadie Last LMSW  [facility]   [id number]

## 2019-12-09 NOTE — ED TRIAGE NOTES
Pt with Hx of muscle spasms, acid reflux and depression arrived to ED via POV with CC of worsening of alter mental status started last night per pt wife at bedside. Pt reports left chest pain describes as sharpness 5/10 on inhalation. Pt reports taken ativan and gabapentin for pain.     Patient identifiers verified and correct for Storm Hinds.    LOC: The patient is awake and oriented to self. Pt has delayed speech and seems difficult to get words out.   APPEARANCE: Patient resting comfortably and in no acute distress. Pt is clean and well groomed. No JVD visible. Pt reports pain level of 5/10.  SKIN: Skin is warm dry and intact, and color is consistent with ethnicity. No tenting observed and capillary refill <3 seconds. No clubbing noted to nail beds. No breakdown or brusing visible and mucus membranes moist and acyanotic.  MUSCULOSKELETAL: Generalized weakness with left ankle pain and limited ROM.  RESPIRATORY: Airway is open and patent. Respirations-unlabored, regular rate, equal bilaterally on inspiration and expiration. No accessory muscle use noted. Lungs clear to auscultation in all fields bilaterally anterior and posterior.   CARDIAC: Patient has regular heart rate and rhythm.  No peripheral edema noted, and patient has c/o chest pain.  ABDOMEN: Soft and non-tender to palpation with no distention noted. Normoactive bowel sounds X4 quadrants. Pt has no complaints of abnormal bowel movements. Pt reports normal appetite.   NEUROLOGIC: Eyes open spontaneously and facial expression symmetrical. Pt behavior appropriate to situation, and pt follows commands.  Pt reports sensation present in all extremities. PERRLA  : No complaints of frequency, burning, urgency or blood in the urine.

## 2019-12-10 PROBLEM — R47.9 DIFFICULTY WITH SPEECH: Status: ACTIVE | Noted: 2019-12-10

## 2019-12-10 PROBLEM — R13.19 ESOPHAGEAL DYSPHAGIA: Status: ACTIVE | Noted: 2019-12-09

## 2019-12-10 PROBLEM — R25.9 ABNORMAL INVOLUNTARY MOVEMENTS: Status: ACTIVE | Noted: 2019-11-12

## 2019-12-10 LAB
ANION GAP SERPL CALC-SCNC: 7 MMOL/L (ref 8–16)
BASOPHILS # BLD AUTO: 0.02 K/UL (ref 0–0.2)
BASOPHILS # BLD AUTO: 0.02 K/UL (ref 0–0.2)
BASOPHILS NFR BLD: 0.3 % (ref 0–1.9)
BASOPHILS NFR BLD: 0.3 % (ref 0–1.9)
BUN SERPL-MCNC: 14 MG/DL (ref 6–20)
CALCIUM SERPL-MCNC: 9 MG/DL (ref 8.7–10.5)
CERULOPLASMIN SERPL-MCNC: 32 MG/DL (ref 15–45)
CHLORIDE SERPL-SCNC: 104 MMOL/L (ref 95–110)
CO2 SERPL-SCNC: 28 MMOL/L (ref 23–29)
CREAT SERPL-MCNC: 1 MG/DL (ref 0.5–1.4)
CRP SERPL-MCNC: 2.4 MG/L (ref 0–8.2)
DIFFERENTIAL METHOD: ABNORMAL
DIFFERENTIAL METHOD: ABNORMAL
EOSINOPHIL # BLD AUTO: 0.3 K/UL (ref 0–0.5)
EOSINOPHIL # BLD AUTO: 0.3 K/UL (ref 0–0.5)
EOSINOPHIL NFR BLD: 4.9 % (ref 0–8)
EOSINOPHIL NFR BLD: 5.1 % (ref 0–8)
ERYTHROCYTE [DISTWIDTH] IN BLOOD BY AUTOMATED COUNT: 12.8 % (ref 11.5–14.5)
ERYTHROCYTE [DISTWIDTH] IN BLOOD BY AUTOMATED COUNT: 13 % (ref 11.5–14.5)
ERYTHROCYTE [SEDIMENTATION RATE] IN BLOOD BY WESTERGREN METHOD: 7 MM/HR (ref 0–23)
EST. GFR  (AFRICAN AMERICAN): >60 ML/MIN/1.73 M^2
EST. GFR  (NON AFRICAN AMERICAN): >60 ML/MIN/1.73 M^2
GLUCOSE SERPL-MCNC: 92 MG/DL (ref 70–110)
HCT VFR BLD AUTO: 39.1 % (ref 40–54)
HCT VFR BLD AUTO: 39.9 % (ref 40–54)
HGB BLD-MCNC: 13 G/DL (ref 14–18)
HGB BLD-MCNC: 13.1 G/DL (ref 14–18)
IMM GRANULOCYTES # BLD AUTO: 0.02 K/UL (ref 0–0.04)
IMM GRANULOCYTES # BLD AUTO: 0.02 K/UL (ref 0–0.04)
IMM GRANULOCYTES NFR BLD AUTO: 0.3 % (ref 0–0.5)
IMM GRANULOCYTES NFR BLD AUTO: 0.3 % (ref 0–0.5)
LYMPHOCYTES # BLD AUTO: 2.1 K/UL (ref 1–4.8)
LYMPHOCYTES # BLD AUTO: 2.2 K/UL (ref 1–4.8)
LYMPHOCYTES NFR BLD: 31.2 % (ref 18–48)
LYMPHOCYTES NFR BLD: 32.1 % (ref 18–48)
MCH RBC QN AUTO: 29.2 PG (ref 27–31)
MCH RBC QN AUTO: 29.8 PG (ref 27–31)
MCHC RBC AUTO-ENTMCNC: 32.6 G/DL (ref 32–36)
MCHC RBC AUTO-ENTMCNC: 33.5 G/DL (ref 32–36)
MCV RBC AUTO: 89 FL (ref 82–98)
MCV RBC AUTO: 90 FL (ref 82–98)
MONOCYTES # BLD AUTO: 0.6 K/UL (ref 0.3–1)
MONOCYTES # BLD AUTO: 0.6 K/UL (ref 0.3–1)
MONOCYTES NFR BLD: 8.6 % (ref 4–15)
MONOCYTES NFR BLD: 8.9 % (ref 4–15)
NEUTROPHILS # BLD AUTO: 3.6 K/UL (ref 1.8–7.7)
NEUTROPHILS # BLD AUTO: 3.6 K/UL (ref 1.8–7.7)
NEUTROPHILS NFR BLD: 53.8 % (ref 38–73)
NEUTROPHILS NFR BLD: 54.2 % (ref 38–73)
NRBC BLD-RTO: 0 /100 WBC
NRBC BLD-RTO: 0 /100 WBC
PATH REV BLD -IMP: NORMAL
PLATELET # BLD AUTO: 265 K/UL (ref 150–350)
PLATELET # BLD AUTO: 267 K/UL (ref 150–350)
PMV BLD AUTO: 9.4 FL (ref 9.2–12.9)
PMV BLD AUTO: 9.4 FL (ref 9.2–12.9)
POTASSIUM SERPL-SCNC: 4.1 MMOL/L (ref 3.5–5.1)
RBC # BLD AUTO: 4.4 M/UL (ref 4.6–6.2)
RBC # BLD AUTO: 4.45 M/UL (ref 4.6–6.2)
SODIUM SERPL-SCNC: 139 MMOL/L (ref 136–145)
THYROPEROXIDASE IGG SERPL-ACNC: <6 IU/ML
TSH SERPL DL<=0.005 MIU/L-ACNC: 0.74 UIU/ML (ref 0.4–4)
WBC # BLD AUTO: 6.61 K/UL (ref 3.9–12.7)
WBC # BLD AUTO: 6.76 K/UL (ref 3.9–12.7)

## 2019-12-10 PROCEDURE — 94010 BREATHING CAPACITY TEST: CPT

## 2019-12-10 PROCEDURE — 86140 C-REACTIVE PROTEIN: CPT

## 2019-12-10 PROCEDURE — 84165 PROTEIN E-PHORESIS SERUM: CPT | Mod: 26,,, | Performed by: PATHOLOGY

## 2019-12-10 PROCEDURE — 86038 ANTINUCLEAR ANTIBODIES: CPT

## 2019-12-10 PROCEDURE — 84165 PROTEIN E-PHORESIS SERUM: CPT

## 2019-12-10 PROCEDURE — 97161 PT EVAL LOW COMPLEX 20 MIN: CPT

## 2019-12-10 PROCEDURE — 11000001 HC ACUTE MED/SURG PRIVATE ROOM

## 2019-12-10 PROCEDURE — 97116 GAIT TRAINING THERAPY: CPT

## 2019-12-10 PROCEDURE — 85025 COMPLETE CBC W/AUTO DIFF WBC: CPT

## 2019-12-10 PROCEDURE — 86256 FLUORESCENT ANTIBODY TITER: CPT | Mod: 91

## 2019-12-10 PROCEDURE — 63600175 PHARM REV CODE 636 W HCPCS: Performed by: STUDENT IN AN ORGANIZED HEALTH CARE EDUCATION/TRAINING PROGRAM

## 2019-12-10 PROCEDURE — 84446 ASSAY OF VITAMIN E: CPT

## 2019-12-10 PROCEDURE — 25000003 PHARM REV CODE 250: Performed by: STUDENT IN AN ORGANIZED HEALTH CARE EDUCATION/TRAINING PROGRAM

## 2019-12-10 PROCEDURE — 86703 HIV-1/HIV-2 1 RESULT ANTBDY: CPT

## 2019-12-10 PROCEDURE — 86592 SYPHILIS TEST NON-TREP QUAL: CPT

## 2019-12-10 PROCEDURE — 36415 COLL VENOUS BLD VENIPUNCTURE: CPT

## 2019-12-10 PROCEDURE — 95816 EEG AWAKE AND DROWSY: CPT

## 2019-12-10 PROCEDURE — 82300 ASSAY OF CADMIUM: CPT

## 2019-12-10 PROCEDURE — 94150 VITAL CAPACITY TEST: CPT

## 2019-12-10 PROCEDURE — 84165 PATHOLOGIST INTERPRETATION SPE: ICD-10-PCS | Mod: 26,,, | Performed by: PATHOLOGY

## 2019-12-10 PROCEDURE — 25500020 PHARM REV CODE 255: Performed by: INTERNAL MEDICINE

## 2019-12-10 PROCEDURE — 99233 PR SUBSEQUENT HOSPITAL CARE,LEVL III: ICD-10-PCS | Mod: ,,, | Performed by: INTERNAL MEDICINE

## 2019-12-10 PROCEDURE — 92610 EVALUATE SWALLOWING FUNCTION: CPT

## 2019-12-10 PROCEDURE — 80048 BASIC METABOLIC PNL TOTAL CA: CPT

## 2019-12-10 PROCEDURE — 99233 SBSQ HOSP IP/OBS HIGH 50: CPT | Mod: ,,, | Performed by: INTERNAL MEDICINE

## 2019-12-10 PROCEDURE — 86376 MICROSOMAL ANTIBODY EACH: CPT

## 2019-12-10 PROCEDURE — 85060 BLOOD SMEAR INTERPRETATION: CPT | Mod: ,,, | Performed by: PATHOLOGY

## 2019-12-10 PROCEDURE — 94761 N-INVAS EAR/PLS OXIMETRY MLT: CPT

## 2019-12-10 PROCEDURE — 86618 LYME DISEASE ANTIBODY: CPT

## 2019-12-10 PROCEDURE — 99233 SBSQ HOSP IP/OBS HIGH 50: CPT | Mod: ,,, | Performed by: PSYCHIATRY & NEUROLOGY

## 2019-12-10 PROCEDURE — 82164 ANGIOTENSIN I ENZYME TEST: CPT

## 2019-12-10 PROCEDURE — 82390 ASSAY OF CERULOPLASMIN: CPT

## 2019-12-10 PROCEDURE — 85060 PATHOLOGIST REVIEW: ICD-10-PCS | Mod: ,,, | Performed by: PATHOLOGY

## 2019-12-10 PROCEDURE — 86334 IMMUNOFIX E-PHORESIS SERUM: CPT | Mod: 26,,, | Performed by: PATHOLOGY

## 2019-12-10 PROCEDURE — 82525 ASSAY OF COPPER: CPT

## 2019-12-10 PROCEDURE — 86334 IMMUNOFIX E-PHORESIS SERUM: CPT

## 2019-12-10 PROCEDURE — 84443 ASSAY THYROID STIM HORMONE: CPT

## 2019-12-10 PROCEDURE — 85652 RBC SED RATE AUTOMATED: CPT

## 2019-12-10 PROCEDURE — 95816 EEG AWAKE AND DROWSY: CPT | Mod: 26,,, | Performed by: PSYCHIATRY & NEUROLOGY

## 2019-12-10 PROCEDURE — 86334 PATHOLOGIST INTERPRETATION IFE: ICD-10-PCS | Mod: 26,,, | Performed by: PATHOLOGY

## 2019-12-10 PROCEDURE — 86255 FLUORESCENT ANTIBODY SCREEN: CPT | Mod: 91

## 2019-12-10 PROCEDURE — 99233 PR SUBSEQUENT HOSPITAL CARE,LEVL III: ICD-10-PCS | Mod: ,,, | Performed by: PSYCHIATRY & NEUROLOGY

## 2019-12-10 PROCEDURE — 97166 OT EVAL MOD COMPLEX 45 MIN: CPT

## 2019-12-10 PROCEDURE — 82726 LONG CHAIN FATTY ACIDS: CPT

## 2019-12-10 PROCEDURE — 95816 PR EEG,W/AWAKE & DROWSY RECORD: ICD-10-PCS | Mod: 26,,, | Performed by: PSYCHIATRY & NEUROLOGY

## 2019-12-10 RX ORDER — GADOBUTROL 604.72 MG/ML
10 INJECTION INTRAVENOUS
Status: COMPLETED | OUTPATIENT
Start: 2019-12-11 | End: 2019-12-10

## 2019-12-10 RX ORDER — TRIHEXYPHENIDYL HYDROCHLORIDE 5 MG/1
5 TABLET ORAL
Status: DISCONTINUED | OUTPATIENT
Start: 2019-12-10 | End: 2019-12-11

## 2019-12-10 RX ORDER — NAPROXEN 375 MG/1
375 TABLET ORAL 2 TIMES DAILY WITH MEALS
Status: DISCONTINUED | OUTPATIENT
Start: 2019-12-10 | End: 2019-12-12 | Stop reason: HOSPADM

## 2019-12-10 RX ADMIN — CARBIDOPA AND LEVODOPA 1 TABLET: 25; 100 TABLET ORAL at 08:12

## 2019-12-10 RX ADMIN — GADOBUTROL 10 ML: 604.72 INJECTION INTRAVENOUS at 11:12

## 2019-12-10 RX ADMIN — BACLOFEN 5 MG: 10 TABLET ORAL at 08:12

## 2019-12-10 RX ADMIN — BACLOFEN 5 MG: 10 TABLET ORAL at 09:12

## 2019-12-10 RX ADMIN — MORPHINE SULFATE 1 MG: 2 INJECTION, SOLUTION INTRAMUSCULAR; INTRAVENOUS at 05:12

## 2019-12-10 RX ADMIN — CARBIDOPA AND LEVODOPA 1 TABLET: 25; 100 TABLET ORAL at 09:12

## 2019-12-10 RX ADMIN — TRIHEXYPHENIDYL HYDROCHLORIDE 4 MG: 2 TABLET ORAL at 09:12

## 2019-12-10 RX ADMIN — FAMOTIDINE 20 MG: 20 TABLET ORAL at 08:12

## 2019-12-10 RX ADMIN — CARBIDOPA AND LEVODOPA 1 TABLET: 25; 100 TABLET ORAL at 03:12

## 2019-12-10 RX ADMIN — BUPROPION HYDROCHLORIDE 150 MG: 150 TABLET, FILM COATED, EXTENDED RELEASE ORAL at 09:12

## 2019-12-10 RX ADMIN — BACLOFEN 5 MG: 10 TABLET ORAL at 03:12

## 2019-12-10 RX ADMIN — FAMOTIDINE 20 MG: 20 TABLET ORAL at 09:12

## 2019-12-10 RX ADMIN — GABAPENTIN 300 MG: 300 CAPSULE ORAL at 09:12

## 2019-12-10 RX ADMIN — IOHEXOL 100 ML: 350 INJECTION, SOLUTION INTRAVENOUS at 05:12

## 2019-12-10 RX ADMIN — MORPHINE SULFATE 1 MG: 2 INJECTION, SOLUTION INTRAMUSCULAR; INTRAVENOUS at 08:12

## 2019-12-10 NOTE — PLAN OF CARE
Radhaal completed and POC established     Jose Chappell, PT, DPT  12/10/2019         Problem: Physical Therapy Goal  Goal: Physical Therapy Goal  Description  Goals to be met by: 1/10/2020    Patient will increase functional independence with mobility by performin. Supine to sit with Modified [place]  2. Sit to supine with Modified [place]  3. Sit to stand transfer with Supervision  4. Gait  x 30 feet with Supervision using LRAD  5. Lower extremity exercise program x15 reps per handout, with independence    Outcome: Ongoing, Progressing

## 2019-12-10 NOTE — PHARMACY MED REC
"Admission Medication Reconciliation - Pharmacy Consult Note    The home medication history was taken by María Christopher, Pharmacy Technician.  Based on information gathered and subsequent review by the clinical pharmacist, the items below may need attention.    You may go to "Admission" then "Reconcile Home Medications" tabs to review and/or act upon these items.      No issues noted with the medication reconciliation.      Please address this information as you see fit.  Feel free to contact us if you have any questions or require assistance.    Marya Schreiber  EXT [id number]   .    .            "

## 2019-12-10 NOTE — PLAN OF CARE
Elliott Wasserman MD  [address] [facility] [place] [address]    Future Appointments   Date Time Provider Department Center   12/12/2019  4:20 PM Elliott Wasserman MD [facility]       [facility] - [place], [place] - [address]  [address]  [place] [address]  Phone: [phone] Fax: [phone]    [facility] - [place], [place] - [address]  [address]  [place] [address]  Phone: [phone] Fax: [phone]    Payor: CIGNA / Plan: CIG[employer] CHOICE FUND OPEN ACCESS  PLUS / Product Type: Commercial /        12/10/19 1212   Discharge Assessment   Assessment Type Discharge Planning Assessment   Confirmed/corrected address and phone number on facesheet? Yes   Assessment information obtained from? Patient;Caregiver  (patient spouse at the bedside )   Prior to hospitilization cognitive status: Alert/Oriented   Prior to hospitalization functional status: Assistive Equipment   Current cognitive status: Alert/Oriented   Current Functional Status: Assistive Equipment   Lives With spouse;child(zita), dependent   Able to Return to Prior Arrangements yes   Is patient able to care for self after discharge? Yes   Who are your caregiver(s) and their phone number(s)?   (Gladis Hinds  spouse [phone])   Patient currently receives any other outside agency services? No   Equipment Currently Used at Home walker, rolling  (since Thursday )   Do you have any problems affording any of your prescribed medications? TBD   Does the patient have transportation home? Yes   Transportation Anticipated family or friend will provide   Discharge Plan A Home with family   Discharge Plan B Home with family;Home Health   DME Needed Upon Discharge  none   Patient/Family in Agreement with Plan yes

## 2019-12-10 NOTE — CONSULTS
[facility]  Neurology  Consult Note    Patient Name: Storm Hinds  MRN: [id number]  Admission Date: 12/9/2019  Hospital Length of Stay: 0 days  Code Status: Full Code   Attending Provider: Mariela Morgan MD   Consulting Provider: Candace Wright MD  Primary Care Physician: Elliott Wasserman MD  Principal Problem:Dystonia    Inpatient consult to neurology  Consult performed by: Candace Wright MD  Consult ordered by: Celso Chacon DO  Reason for consult: progressive movement disorder         Subjective:     Chief Complaint:  Painful stiffness in LLE + difficulty with speech and swallowing    HPI:   The patient is a 40 y/o M with no significant PMHx except recent-onset dystonia of LLE, presenting to the ED with worsening of symptoms.    The history is taken from his spouse at the bedside and the patient is minimally contributing due to difficulty with speech. They both remember the acute onset of pain and muscle spasm in the left lower extremity on September 16th when they were attending their son's football game.  However inquiring about previous symptoms, they both admit to several falls within the few months prior due to his ankle rotating externally (possibly unnoticed footdrop). Since September 16th his symptoms have been progressively worsening to the point that he is ambulating with a walker now. They also report difficulty swallowing solid food, tremors in distal extremities, intermittent sweating, lightheadedness, and blurred vision. He denies any accidents with bladder or bowels, however reports intermittent difficulty with urination. They also report a recent-onset difficulty with speech which he describes as having to physically fight to get the words out (against clenched jaw), but sometimes has difficulty finding the words as well.  Wife mentions a few episodes of forgetting things within 2 minutes of being told. Patient was started on baclofen and tizanidine with moderate  response however could not continue due to drowsiness. He was seen in the Neurology Clinic at [facility] on 11/11, evaluated by Doctors Marisa, Robert, and Manuel. He was started on Artane due to presumed early-onset Parkinson's disease which he responded well to up until last week, when he was started on Ativan due to worsening of symptoms which helped. that he has been worsening overall.  He was planned to CT at that scan as outpatient ([facility]), however that was not obtained due to high co-pay.     Patient is an , works outside, travels to [place] frequently, he mentions that he has seen tics on his clothes but hasn't noticed being bitten by them.  He is a former smoker (3x13 PPD), and former heavy drinker (a case of beer daily x10 years), quit both 10 years ago.  He denies family history of Parkinson's disease but reports leukemia in father and prostate cancer in grandfather.       Past Medical History:   Diagnosis Date    Acid reflux     Depression     Muscle spasm     Pain     Tremors of nervous system        History reviewed. No pertinent surgical history.    Review of patient's allergies indicates:  No Known Allergies    Current Neurological Medications: Baclofen, Bupropion, Gabapentin, Trihexiphenidyl    No current facility-administered medications on file prior to encounter.      Current Outpatient Medications on File Prior to Encounter   Medication Sig    baclofen (LIORESAL) 5 mg Tab tablet Take 1 tablet (5 mg total) by mouth 3 (three) times daily.    buPROPion (WELLBUTRIN XL) 150 MG TB24 tablet Take 1 tablet (150 mg total) by mouth once daily.    diclofenac sodium (VOLTAREN) 1 % Gel Apply 2 g topically 3 (three) times daily as needed.    gabapentin (NEURONTIN) 300 MG capsule TAKE 1 CAPSULE BY MOUTH ONCE DAILY IN THE EVENING    LORazepam (ATIVAN) 1 MG tablet Take 1 tablet (1 mg total) by mouth every 12 (twelve) hours as needed for Anxiety.    ranitidine (ZANTAC) 300 MG tablet Take 1  tablet (300 mg total) by mouth every evening.    trihexyphenidyl (ARTANE) 2 MG tablet Take 1 tablet (2 mg total) by mouth 4 (four) times daily with meals and nightly. (2 mg three times daily x 1 wk, if well-tolerated increase)     Family History     Problem Relation (Age of Onset)    Cancer Father    Hypertension Mother, Paternal Grandmother, Paternal Grandfather    Stroke Mother        Tobacco Use    Smoking status: Former Smoker    Smokeless tobacco: Former User   Substance and Sexual Activity    Alcohol use: No     Comment: use to    Drug use: No    Sexual activity: Not on file     Review of Systems   Constitutional: Positive for activity change and diaphoresis. Negative for appetite change, chills, fever and unexpected weight change.   HENT: Positive for trouble swallowing. Negative for drooling.    Eyes: Positive for visual disturbance. Negative for photophobia.   Respiratory: Negative for choking and shortness of breath.    Cardiovascular: Negative for chest pain and palpitations.   Gastrointestinal: Negative for abdominal pain, blood in stool, constipation, diarrhea, nausea and vomiting.   Genitourinary: Positive for difficulty urinating. Negative for frequency and hematuria.   Musculoskeletal: Positive for arthralgias, gait problem, myalgias and neck pain. Negative for joint swelling and neck stiffness.   Skin: Negative for color change and rash.   Allergic/Immunologic: Negative for immunocompromised state.   Neurological: Positive for tremors, speech difficulty, weakness and numbness. Negative for dizziness, seizures, facial asymmetry and headaches.   Psychiatric/Behavioral: Positive for sleep disturbance. Negative for agitation, confusion and hallucinations.     Objective:     Vital Signs (Most Recent):  Temp: 97.7 °F (36.5 °C) (12/09/19 1204)  Pulse: 100 (12/09/19 1315)  Resp: 15 (12/09/19 1315)  BP: (!) 123/58 (12/09/19 1315)  SpO2: 95 % (12/09/19 1315) Vital Signs (24h Range):  Temp:  [97.7 °F  (36.5 °C)] 97.7 °F (36.5 °C)  Pulse:  [] 100  Resp:  [13-18] 15  SpO2:  [95 %-97 %] 95 %  BP: (123-140)/(58-76) 123/58     Weight: 95.3 kg (210 lb)  Body mass index is 28.48 kg/m².    Physical Exam   Constitutional: He appears well-developed and well-nourished. He is cooperative. No distress.   Young male with mild distress when talking   HENT:   Head: Normocephalic and atraumatic.   Eyes: Pupils are equal, round, and reactive to light. EOM are normal.   Neck: Normal range of motion. Muscular tenderness present. No neck rigidity. Normal range of motion present.   Cardiovascular: Normal rate.   Pulmonary/Chest: Effort normal. No tachypnea. No respiratory distress.   Mildly short of breath with exertion during physical examination   Abdominal: Soft. He exhibits no distension. There is no tenderness.   Musculoskeletal: He exhibits deformity. He exhibits no edema.   Neurological: He is alert. He displays tremor. He displays no seizure activity.   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 No rash noted. He is not diaphoretic. No erythema.   Psychiatric: He has a normal mood and affect. His behavior is normal. His speech is slurred. He is not actively hallucinating.   Vitals reviewed.      Significant Labs:   CBC:   Recent Labs   Lab 12/09/19  1251   WBC 8.18   HGB 13.9*   HCT 40.9        CMP:   Recent Labs   Lab 12/09/19  1251   *      K 3.9      CO2 25   BUN 14   CREATININE 0.9   CALCIUM 9.6   PROT 7.6   ALBUMIN 4.1   BILITOT 0.5   ALKPHOS 81   AST 25   ALT 29   ANIONGAP 10   EGFRNONAA >60.0       Significant Imaging:     MRI C spine w/o contrast (09/23/19):  1. Multilevel degenerative disc disease from C3-C5 resulting in moderate bilateral neural foraminal narrowing.  2. Degenerative disc disease at C5-C6 resulting in mild bilateral neural foraminal narrowing.  3. Degenerative disc disease at C6-C7 resulting in mild central spinal canal stenosis with severe narrowing of the right neural foramen and moderate narrowing the left neural foramen.     MRI L spine w/o contrast (09/23/19):  1. Sacralization of L5.  2. Grade 1 retrolisthesis of L4 on L5.  3. Degenerative disc disease at L4-L5 with a small central focal disc protrusion resulting in mild central spinal canal stenosis with mild bilateral neural foraminal narrowing.  4. Signal abnormality within the posterior central L4-L5 disc consistent with a small annular fissure.     MRI T spine w/o contrast (10/02/19):  Essentially normal MRI of the thoracic spine.     MRI Brain w/ w/o contrast (10/02/19):  No acute intracranial process. Normal appearing MRI of the Brain.    Assessment and Plan:     * Dystonia  Young otherwise healthy M with progressive spasticity in LLE (more noticeable x3 months), painful muscle spasms, generalized tremors, with progressive worsening, now involving motor component of speech and swallowing. Family Hx  positive for cancer, and patient has significant Hx of smoking and drinking. Unfortunately FIONA scan was not obtained due to high co-pay, MRI of Brain and C/L spine obtained in October without any finding that could justify the severity of symptoms. Differential diagnosis are including but not limited to early-onset atypical PD, PERM syndrome (considering progressive rigidity and brainstem dysfunction), partial SPS, and paraneoplastic SPS. The fact that he has mixed findings on exam and has responded to both Artane and benzodiazepines makes the diagnosis challenging.    - Please increase trihexyphenidyl to 5 mg BID (if available)  - Please started on Sinemet 25 -100 mg TID, will adjust based on response and/or possible side effects  - Please obtain ESR, CRP, TSH, Anti-TPO, and paraneoplastic panel in serum (includes Anti-RADHA and Anti-amphiphysin antibodies)  - Recommend swallow evaluation by SLP   - Recommend obtaining respiratory mechanics (VC + NIF)     Oropharyngeal dysphagia  Insidious-onset, progressive, to solids only  Likely secondary to abnormal laryngeal motor coordination/strength     - Recommend SLP evaluation     Abnormality of gait and mobility  Secondary to dystonic LLE  Ambulates with walker at home    - PT/OT to evaluate and treat  - Might benefit from Botox injection in the future        VTE Risk Mitigation (From admission, onward)         Ordered     Place sequential compression device  Until discontinued      12/09/19 1921                Thank you for your consult. I will follow-up with patient. Please contact us if you have any additional questions.    Candace Wright MD  Neurology  [facility]

## 2019-12-10 NOTE — HPI
The patient is a 39 year old male with past medical history MDD, reflux, smoking, recent LLE dystonia onset, presenting for worsening of recent symptoms and new speech difficulty. The patient's history is aided by significant other. The patient was his son's football game in September when he suddenly experienced pain and spasms in his left leg. The patient symptoms have been gradually worsening since then. Starting 4 days ago the patient started experiencing difficulty with speech when working with a rehab therapist. The problem waxes and wanes but overall worsening since then. In association he has difficulty with swallowing. Between squeezing out short clear audible phrases on questioning, the patient's face, jaw, and neck clenches. Sometimes the patient has word finding difficulty. The patient denies any loss of bowel or urinary control, but reports some decreased urine output possibly attributed to him by decreased po intake. The patient has seen multiple neurologists who have considered early onset Parkinson's and start ropinirole at one time without reported success. MRI of brain and spine in October unremarkable. Trihexylphenidyl was started with possible mild improvement of symptoms.     Patient in the past smoked up to 3 packs per day of tobacco quitting 10 years ago. He travels to [place] at times within the past year and has possibly seen tick on clothes but denies any rash or noting tic directly on skin. He does not maintain recent alcohol drinking. He denies any family history of neurological or autoimmune disorders, but father had leukemia. Denies fever or chills.

## 2019-12-10 NOTE — PLAN OF CARE
Problem: SLP Goal  Goal: SLP Goal  Description  Goals to be met 12/17  1 pt will tolerate regular diet and thin liquids  2 pt will participate in speech lang eval    Outcome: Ongoing, Progressing     Bedside swallow eval completed. Pt safe for regular diet and thin liquids. Avoid dry, chewy foods. Upright for all meals. QUINTEN Griffiths, CCC-SLP  12/10/2019

## 2019-12-10 NOTE — ASSESSMENT & PLAN NOTE
Spastic speech pattern, fluctuating  Improved this morning but can not relate that to medication since this was intermittent to begin with

## 2019-12-10 NOTE — PLAN OF CARE
Pt arrived to unit stable. No signs or symptoms of distress/discomfort noted. C/o pain to left ankle. Vitals stable. Safety maintained. Will continue to monitor.

## 2019-12-10 NOTE — ASSESSMENT & PLAN NOTE
Secondary to dystonic LLE  Ambulates with walker at home    - PT/OT to evaluate and treat  - Might benefit from Botox injection in the future (depending on how symptoms evolve)

## 2019-12-10 NOTE — PROGRESS NOTES
[facility] Hospital Medicine  Progress Note    Patient Name: Storm Hinds  MRN: [id number]  Patient Class: IP- Inpatient   Admission Date: 12/9/2019  Length of Stay: 1 days  Attending Physician: Mariela Morgan MD  Primary Care Provider: Elliott Wasserman MD    [facility] Medicine Team: [facility] HOSP MED 3 Celso Chacon DO    Subjective:     Principal Problem:Abnormal involuntary movements        HPI:  The patient is a 39 year old male with past medical history MDD, reflux, smoking, recent LLE dystonia onset, presenting for worsening of recent symptoms and new speech difficulty. The patient's history is aided by significant other. The patient was his son's football game in September when he suddenly experienced pain and spasms in his left leg. The patient symptoms have been gradually worsening since then. Starting 4 days ago the patient started experiencing difficulty with speech when working with a rehab therapist. The problem waxes and wanes but overall worsening since then. In association he has difficulty with swallowing. Between squeezing out short clear audible phrases on questioning, the patient's face, jaw, and neck clenches. Sometimes the patient has word finding difficulty. The patient denies any loss of bowel or urinary control, but reports some decreased urine output possibly attributed to him by decreased po intake. The patient has seen multiple neurologists who have considered early onset Parkinson's and start ropinirole at one time without reported success. MRI of brain and spine in October unremarkable. Trihexylphenidyl was started with possible mild improvement of symptoms.     Patient in the past smoked up to 3 packs per day of tobacco quitting 10 years ago. He travels to [place] at times within the past year and has possibly seen tick on clothes but denies any rash or noting tic directly on skin. He does not maintain recent alcohol drinking. He denies any family history of  neurological or autoimmune disorders, but father had leukemia. Denies fever or chills.    Overview/Hospital Course:  The patient will be admitted for neurological workup of dystonia and expressive aphasia. MRI barbra ordered, ESR, CRP, paraneoplastic panel, SLP, resp mechanics, TSH, will be started on sinemet and trihexylphenidyl. 12/10 Patient tremors, bradykinesia, speech difficulty improved since sinemet started. Thyroid labs and ESR/CRP within normal limits. Respiratory mechanics NIF of -50 and VC of 1750ml. [place] of labs ordered to rule out metal or tick exposure, autoimmune causes, Pan imaging ordered.     Past Medical History:   Diagnosis Date    Acid reflux     Depression     Muscle spasm     Pain     Tremors of nervous system        History reviewed. No pertinent surgical history.    Review of patient's allergies indicates:  No Known Allergies    No current facility-administered medications on file prior to encounter.      Current Outpatient Medications on File Prior to Encounter   Medication Sig    baclofen (LIORESAL) 5 mg Tab tablet Take 1 tablet (5 mg total) by mouth 3 (three) times daily. (Patient taking differently: Take 10 mg by mouth daily as needed. )    buPROPion (WELLBUTRIN XL) 150 MG TB24 tablet Take 1 tablet (150 mg total) by mouth once daily.    gabapentin (NEURONTIN) 300 MG capsule TAKE 1 CAPSULE BY MOUTH ONCE DAILY IN THE EVENING    LORazepam (ATIVAN) 1 MG tablet Take 1 tablet (1 mg total) by mouth every 12 (twelve) hours as needed for Anxiety. (Patient taking differently: Take 1 mg by mouth every 12 (twelve) hours. )    ranitidine (ZANTAC) 300 MG tablet Take 1 tablet (300 mg total) by mouth every evening.    trihexyphenidyl (ARTANE) 2 MG tablet Take 1 tablet (2 mg total) by mouth 4 (four) times daily with meals and nightly. (2 mg three times daily x 1 wk, if well-tolerated increase) (Patient taking differently: Take 2 mg by mouth 3 (three) times daily. )    diclofenac sodium  (VOLTAREN) 1 % Gel Apply 2 g topically 3 (three) times daily as needed.     Family History     Problem Relation (Age of Onset)    Cancer Father    Hypertension Mother, Paternal Grandmother, Paternal Grandfather    Stroke Mother        Tobacco Use    Smoking status: Former Smoker    Smokeless tobacco: Former User   Substance and Sexual Activity    Alcohol use: No     Comment: use to    Drug use: No    Sexual activity: Not on file     Review of Systems   Constitutional: Positive for activity change. Negative for appetite change, chills, fatigue and fever.   HENT: Positive for trouble swallowing. Negative for sore throat.    Eyes: Negative for photophobia and visual disturbance.   Respiratory: Negative for cough and shortness of breath.    Cardiovascular: Negative for chest pain and palpitations.   Gastrointestinal: Negative for abdominal distention, abdominal pain, blood in stool, constipation, diarrhea, nausea and vomiting.   Genitourinary: Positive for difficulty urinating. Negative for flank pain, frequency and hematuria.   Musculoskeletal: Negative for myalgias.   Skin: Negative for color change and pallor.   Neurological: Positive for tremors (improving), speech difficulty, weakness and numbness. Negative for dizziness and headaches.   Psychiatric/Behavioral: Negative for agitation and behavioral problems. The patient is nervous/anxious.      Objective:     Vital Signs (Most Recent):  Temp: 97.7 °F (36.5 °C) (12/10/19 1155)  Pulse: 77 (12/10/19 1155)  Resp: 16 (12/10/19 1155)  BP: 101/61 (12/10/19 1155)  SpO2: 97 % (12/10/19 1155) Vital Signs (24h Range):  Temp:  [95.9 °F (35.5 °C)-98.2 °F (36.8 °C)] 97.7 °F (36.5 °C)  Pulse:  [71-98] 77  Resp:  [14-20] 16  SpO2:  [94 %-98 %] 97 %  BP: (101-133)/(59-84) 101/61     Weight: 95.3 kg (210 lb)  Body mass index is 28.48 kg/m².      INTERVAL HISTORY: NAEO, Patient symptoms improving on sinemet and arnate, more labs and imaging ordered per neurology    Physical Exam    Constitutional: He is oriented to person, place, and time. He appears well-developed and well-nourished. He appears distressed (improved).   HENT:   Head: Normocephalic and atraumatic.   Eyes: EOM are normal. No scleral icterus.   Neck: Neck supple. No tracheal deviation present.   Cardiovascular: Normal rate, regular rhythm and normal heart sounds.   No murmur heard.  Pulmonary/Chest: Breath sounds normal. He has no wheezes. He has no rales.   Abdominal: Soft. Bowel sounds are normal. He exhibits no distension. There is no tenderness.   Musculoskeletal: He exhibits deformity. He exhibits no edema.   Neurological: He is alert and oriented to person, place, and time. He exhibits abnormal muscle tone (improving). Coordination (improving) abnormal.   Expressive aphasia.  Dorsiflexion 3/5 and plantar flexion 4/5 on LLE, tremor noted bilateral LE, L>R, 1st digit dorsiflexed,   RUE  weakness, rigidity on UE    Skin: Skin is warm and dry.   Psychiatric: His behavior is normal. His mood appears anxious. His speech is slurred.         CRANIAL NERVES     CN III, IV, VI   Extraocular motions are normal.        Significant Labs:   Recent Lab Results       12/10/19  0720        Anion Gap 7     Baso # 0.02      0.02     Basophil% 0.3      0.3     BUN, Bld 14     Calcium 9.0     Chloride 104     CO2 28     Creatinine 1.0     CRP 2.4     Differential Method Automated      Automated     eGFR if African American >60.0     eGFR if non  >60.0  Comment:  Calculation used to obtain the estimated glomerular filtration  rate (eGFR) is the CKD-EPI equation.        Eos # 0.3      0.3     Eosinophil% 5.1      4.9     Glucose 92     Gran # (ANC) 3.6      3.6     Gran% 54.2      53.8     Hematocrit 39.1      39.9     Hemoglobin 13.1      13.0     Immature Grans (Abs) 0.02  Comment:  Mild elevation in immature granulocytes is non specific and   can be seen in a variety of conditions including stress response,   acute  inflammation, trauma and pregnancy. Correlation with other   laboratory and clinical findings is essential.        0.02  Comment:  Mild elevation in immature granulocytes is non specific and   can be seen in a variety of conditions including stress response,   acute inflammation, trauma and pregnancy. Correlation with other   laboratory and clinical findings is essential.       Immature Granulocytes 0.3      0.3     Lymph # 2.1      2.2     Lymph% 31.2      32.1     MCH 29.8      29.2     MCHC 33.5      32.6     MCV 89      90     Mono # 0.6      0.6     Mono% 8.9      8.6     MPV 9.4      9.4     nRBC 0      0     Pathologist Review Review required     Platelets 267      265     Potassium 4.1     RBC 4.40      4.45     RDW 13.0      12.8     Sed Rate 7     Sodium 139     Thyroperoxidase Antibodies <6.0     TSH 0.744     WBC 6.61      6.76           Significant Imaging: I have reviewed and interpreted all pertinent imaging results/findings within the past 24 hours.      Assessment/Plan:      * Abnormal involuntary movements    The patient is a 39 year old male with past medical history of smoking, chronic low back pain, HLD, recent LLE dystonia, presenting for worsening expressive aphasia for 4 days. The patient had first onset of LLE dystonia in 9/2019, now progressing with spasticity, with acute onset of aphasia this past Thursday proma. The patient has seen multiple neurologists, trying ropinirole without improvement, and has recently started trihexyphenidyl after 11/11 neurology appt. Patient possibly may have responded well to artane. Patient with smoking history, [place] travel with tick seen on clothes, father had leukemia. No neurological illness in family. He was not able to afford FIONA scan suggested by neurologist. Neurology consulted, differential including PD, PERM syndrome, partial SPS, paraneoplastic SPS.    PLAN  -neurology following, recs appreciated  -Sinemet  TID for sx  relief  -Trihexylphenidyl 5mg bid sx relief  -ESR, CRP, TSH, Anti-TPO WNL  -await paraneoplastic panel w Anti-RADHA and Anti-amphiphysin Ab  - MRI of C/T/L spine w/wo contrast ordered  - RPR, HIV 1/2 ab, TB, Lyme Ab, GABRIEL, ACE, Heavy metal screen, Copper, Ceruloplasmin, SPEP/UPEP/DELILAH, Vitamin E, Acanthocytes in Peripheral smear, LVCFA ordered  - EEG to exclude epileptic component to the rhythmic repetitive movements of L foot ordered  - CT scan of C/A/P  to r/o possible underlying malignancy  ordered  - Possible LP per neuro tomorrow  -SLP consult, diet ordered, esophagram ordered per rec  -resp mechanics NIF of -50 and VC of 1750ml  -PT/OT for gait  -patient my be candidate for botox for LLE dystonia in future    Esophageal dysphagia  See dystonia  Esophagram ordered    Abnormality of gait and mobility  See dystonia        VTE Risk Mitigation (From admission, onward)         Ordered     Place sequential compression device  Until discontinued      12/09/19 7478                      Celso Chacon DO  Department of Hospital Medicine   [facility]

## 2019-12-10 NOTE — PROGRESS NOTES
[facility]  Neurology  Progress Note    Patient Name: Storm Hinds  MRN: [id number]  Admission Date: 12/9/2019  Hospital Length of Stay: 1 days  Code Status: Full Code   Attending Provider: Mariela Morgan MD  Primary Care Physician: Elliott Wasserman MD   Principal Problem:Abnormal involuntary movements      Subjective:     Brief HPI:  A 38 y/o M with no significant PMHx, p/w progressive spasticity in LLE (more noticeable x3 months), painful muscle spasms, generalized tremors, with progressive worsening, now involving motor component of speech and swallowing. He also reports intermittent diaphoresis, lightheadedness, blurred vision, and difficulty urinating. Family Hx positive for cancer, and patient has significant Hx of smoking and drinking. Unfortunately FIONA scan was not obtained due to high co-pay, MRI of Brain and C/L spine obtained in October without any finding that could justify the severity of symptoms. He was tried on Baclofen and Tizanidine with mild response (limited by drowsiness). He has responded moderately well to Artane and Ativan that were started recently.     Interval History:   12/10: The patient was started on sinemet last night. Artane was also increased to total of 10 mg daily (increase was not taken effect yet at the time of exam). Speech is improved this morning, unclear if it's in the setting of his usual fluctuations. Planning extensive for infectious, toxic and malignancy work up.    Current Neurological Medications: Baclofen, Bupropion, Gabapentin, Trihexyphenidyl, Carbidopa-levodopa    Current Facility-Administered Medications   Medication Dose Route Frequency Provider Last Rate Last Dose    acetaminophen suppository 325 mg  325 mg Rectal Q8H PRN Celso Chacon DO        baclofen split tablet 5 mg  5 mg Oral TID Celso Chacon DO   5 mg at 12/09/19 2129    buPROPion TB24 tablet 150 mg  150 mg Oral Daily Celso Chacon DO        carbidopa-levodopa   mg per tablet 1 tablet  1 tablet Oral TID Celso Chacon, DO   1 tablet at 12/09/19 2129    dextrose 10% (D10W) Bolus  12.5 g Intravenous PRN Mariela Morgan MD        dextrose 10% (D10W) Bolus  25 g Intravenous PRN Mariela Morgan MD        diclofenac sodium 1 % gel 2 g  2 g Topical (Top) Daily Celso Chacon, DO        famotidine tablet 20 mg  20 mg Oral BID Celso Chacon, DO   20 mg at 12/09/19 2129    gabapentin capsule 300 mg  300 mg Oral Daily Celso Chacon, DO        glucagon (human recombinant) injection 1 mg  1 mg Intramuscular PRN Celso Chacon, DO        glucose chewable tablet 16 g  16 g Oral PRN Celso Chacon, DO        glucose chewable tablet 24 g  24 g Oral PRN Celso Parsonsay, DO        LORazepam tablet 1 mg  1 mg Oral Q12H PRN Celso Chacon, DO        morphine injection 1 mg  1 mg Intravenous Q6H PRN Celso Chacon, DO        sodium chloride 0.9% flush 10 mL  10 mL Intravenous PRN Celso Chacon, DO        trihexyphenidyl tablet 2 mg  2 mg Oral TID WM Celso Chacon, DO   2 mg at 12/09/19 2131    trihexyphenidyl tablet 4 mg  4 mg Oral Daily with breakfast Celso Chacon,            Review of Systems   Constitutional: Positive for activity change. Negative for chills and fever.   HENT: Positive for trouble swallowing. Negative for drooling.    Eyes: Positive for visual disturbance. Negative for photophobia.   Respiratory: Negative for choking and shortness of breath.    Cardiovascular: Negative for chest pain and palpitations.   Gastrointestinal: Negative for abdominal pain, nausea and vomiting.   Genitourinary: Positive for decreased urine volume and difficulty urinating.   Musculoskeletal: Positive for arthralgias, gait problem, myalgias and neck pain. Negative for neck stiffness.   Skin: Negative for color change and rash.   Allergic/Immunologic: Negative for immunocompromised state.   Neurological: Positive for tremors, speech difficulty, weakness and numbness. Negative for dizziness,  seizures and facial asymmetry.   Psychiatric/Behavioral: Negative for agitation, confusion, dysphoric mood and hallucinations.     Objective:     Vital Signs (Most Recent):  Temp: 96.8 °F (36 °C) (12/10/19 0441)  Pulse: 72 (12/10/19 0441)  Resp: 17 (12/10/19 0441)  BP: 113/64 (12/10/19 0441)  SpO2: (!) 94 % (12/10/19 0441) Vital Signs (24h Range):  Temp:  [96.1 °F (35.6 °C)-98.2 °F (36.8 °C)] 96.8 °F (36 °C)  Pulse:  [] 72  Resp:  [13-20] 17  SpO2:  [94 %-98 %] 94 %  BP: (103-146)/(58-84) 113/64     Weight: 95.3 kg (210 lb)  Body mass index is 28.48 kg/m².    Physical Exam   Constitutional: He is oriented to person, place, and time. He appears well-developed and well-nourished. He is cooperative.   Young male in mild distress when trying to speak   HENT:   Head: Normocephalic and atraumatic.   Eyes: Pupils are equal, round, and reactive to light. EOM are normal.   Neck: Normal range of motion. Muscular tenderness present. No neck rigidity. Normal range of motion present.   Cardiovascular: Normal rate.   Pulmonary/Chest: Effort normal. No tachypnea. No respiratory distress.   Mildly short of breath with exertion during physical examination    Abdominal: Soft. Normal appearance. He exhibits no distension.   Musculoskeletal: He exhibits no edema.        Left foot: There is decreased range of motion and deformity.   Neurological: He is alert and oriented to person, place, and time. He displays no seizure activity.   Spastic speech  PERRL, no nystagmus  EOM intact but diplopia on midline gaze + R lateral gaze  Facial expression intact but sensation decreased on R lower face  Strength in glossal, masticatory muscles, and H&N muscles 5/5  No tongue or uvula deviation  L shoulder abduction 4/5 (painful ROM), LUE 4+/5 distally, normal tone  LLE 4/5 proximally, 3/5 dorsiflexion, 4/5 plantar flexion, increased tone (dystonic plantar flexion on L foot)  RUE and RLE 5/5, normal tone  Bradykinesia and dysdiadochokinesia on  BUE  Pill-rolling tremor on LUE has subsided  Sensation to light touch decreased on R shoulder and arm + LLE  DTRs 2+ throughout except 3+ on LLE + ankle clonus  L striatal toe and R down going toes   Coordination has improved on BUE (L>R)  low frequency low amplitude tremor on BUE  deferred gait   Skin: Skin is warm. No rash noted. He is not diaphoretic.   Psychiatric: He has a normal mood and affect. His behavior is normal. His speech is slurred. He is not actively hallucinating.       Significant Labs:     CBC:   Recent Labs   Lab 12/09/19  1251   WBC 8.18   HGB 13.9*   HCT 40.9        CMP:   Recent Labs   Lab 12/09/19  1251   *      K 3.9      CO2 25   BUN 14   CREATININE 0.9   CALCIUM 9.6   PROT 7.6   ALBUMIN 4.1   BILITOT 0.5   ALKPHOS 81   AST 25   ALT 29   ANIONGAP 10   EGFRNONAA >60.0     Urine Studies:   Recent Labs   Lab 12/09/19  1425   COLORU Yellow   APPEARANCEUA Clear   PHUR 7.0   SPECGRAV 1.015   PROTEINUA Negative   GLUCUA Negative   KETONESU Negative   BILIRUBINUA Negative   OCCULTUA Negative   NITRITE Negative   LEUKOCYTESUR Negative     Pending labs:  Paraneoplastic panel in serum  Anti-TPO  ESR  CRP    Significant Imaging:   MRI Brain w/ w/o contrast (12/9/19):  No acute intracranial abnormality  Unchanged from prior exam    MRI C spine w/o contrast (09/23/19):  1. Multilevel degenerative disc disease from C3-C5 resulting in moderate bilateral neural foraminal narrowing.  2. Degenerative disc disease at C5-C6 resulting in mild bilateral neural foraminal narrowing.  3. Degenerative disc disease at C6-C7 resulting in mild central spinal canal stenosis with severe narrowing of the right neural foramen and moderate narrowing the left neural foramen.     MRI L spine w/o contrast (09/23/19):  1. Sacralization of L5.  2. Grade 1 retrolisthesis of L4 on L5.  3. Degenerative disc disease at L4-L5 with a small central focal disc protrusion resulting in mild central spinal canal  stenosis with mild bilateral neural foraminal narrowing.  4. Signal abnormality within the posterior central L4-L5 disc consistent with a small annular fissure.     MRI T spine w/o contrast (10/02/19):  Essentially normal MRI of the thoracic spine.     MRI Brain w/ w/o contrast (10/02/19):  No acute intracranial process. Normal appearing MRI of the Brain    Assessment and Plan:     * Abnormal involuntary movements  Young otherwise healthy M with progressive spasticity in LLE (more noticeable x3 months) and striatal L toe, painful muscle spasms (L>R), BUE tremors, BLE involuntary movements, spastic speech and esophageal dysphagia, as well as sensory changes, which does not localize to a specific location or category of neurological disorder. This and the rapidity of progression is worrisome for a toxic (heavy metals,..) vs infectious (Lyme's, Tuberculosis, HIV,...) vs paraneoplastic vs hereditary (Jourdan's, Neuroacanthocytosis,..) phenomenon.      - Please continue trihexyphenidyl to 5 mg BID + Sinemet 25 -100 mg TID for some symptomatic relief in the meanwhile that diagnostic work up is being carried on  - Obtain MRI of C/T/L spine w/wo contrast  - Obtain RPR, HIV 1/2 ab, TB, Lyme Ab, GABRIEL, ACE, Heavy metal screen, Copper, Ceruloplasmin, SPEP/UPEP/DELILAH, Vitamin E, Acanthocytes in PBS, very long chain fatty acids  - Obtain a routine EEG to exclude epileptic component to the rhythmic repetitive movements of L foot (order is in)  - Obtain CT scan of C/A/P for investigating underlying malignancy   - Tentative plans for LP tomorrow after MRI is done  - Will follow up on paraneoplastic panel in serum and the rest of labs      Esophageal dysphagia  Insidious-onset, progressive, to solids only  Likely secondary to abnormal motor coordination or muscle spasms     - Recommended SLP evaluation who recommended regular diet with thin liquids  - Recommend obtaining MBSS to evaluate esophageal involvement    Difficulty with  speech  Spastic speech pattern, fluctuating  Improved this morning but can not relate that to medication since this was intermittent to begin with    Abnormality of gait and mobility  Secondary to dystonic LLE  Ambulates with walker at home    - PT/OT to evaluate and treat  - Might benefit from Botox injection in the future (depending on how symptoms evolve)        VTE Risk Mitigation (From admission, onward)         Ordered     Place sequential compression device  Until discontinued      12/09/19 8692                Candace Wright MD  Neurology  [facility]

## 2019-12-10 NOTE — ASSESSMENT & PLAN NOTE
The patient is a 39 year old male with past medical history of smoking, chronic low back pain, HLD, recent LLE dystonia, presenting for worsening expressive aphasia for 4 days. The patient had first onset of LLE dystonia in 9/2019, now progressing with spasticity, with acute onset of aphasia this past Thursday proma. The patient has seen multiple neurologists, trying ropinirole without improvement, and has recently started trihexyphenidyl after 11/11 neurology appt. Patient possibly may have responded well to artane. Patient with smoking history, [place] travel with tick seen on clothes, father had leukemia. No neurological illness in family. He was not able to afford FIONA scan suggested by neurologist. Neurology consulted, differential including PD, PERM syndrome, partial SPS, paraneoplastic SPS.    PLAN  -neurology following, recs appreciated  -Sinemet  TID for sx relief  -Trihexylphenidyl 5mg bid sx relief  -ESR, CRP, TSH, Anti-TPO WNL  -await paraneoplastic panel w Anti-RADHA and Anti-amphiphysin Ab  - MRI of C/T/L spine w/wo contrast ordered  - RPR, HIV 1/2 ab, TB, Lyme Ab, GABRIEL, ACE, Heavy metal screen, Copper, Ceruloplasmin, SPEP/UPEP/DELILAH, Vitamin E, Acanthocytes in Peripheral smear, LVCFA ordered  - EEG to exclude epileptic component to the rhythmic repetitive movements of L foot ordered  - CT scan of C/A/P to r/o possible underlying malignancy ordered  - Possible LP per neuro tomorrow  -SLP consult, diet ordered, esophagram ordered per rec  -resp mechanics NIF of -50 and VC of 1750ml  -PT/OT for gait  -patient my be candidate for botox for LLE dystonia in future

## 2019-12-10 NOTE — PT/OT/SLP EVAL
Occupational Therapy   Evaluation    Name: Storm Hinds  MRN: [id number]  Admitting Diagnosis:  Abnormal involuntary movements      Recommendations:     Discharge Recommendations:    Discharge Equipment Recommendations:  none  Barriers to discharge:  None    Assessment:     Storm Hinds is a 39 y.o. male with a medical diagnosis of Abnormal involuntary movements. Pt presents with ataxic movement and tremors.  Pt was fully indep prior to Aug and with continued medical testing for diagnosis.  Pt with noted decreased functional performance and will provide training and education for gains.  Pt will benefit from Home Health for [place] home performance and safety     Plan:     During this hospitalization, patient does require further acute OT services.  3x week    · Plan of Care Reviewed with: patient    Subjective     Chief Complaint: poor functional mobility   Patient/Family Comments/goals: return to home and PLOF     Occupational Profile:  Living Environment: Pt lives with spouse and 3 children in 1 story house with 2 steps to enter   Previous level of function: Prior to Sept pt was indep in self care home, employment, and community   Equipment Used at home:  walker, rolling, cane, quad, shower chair  Assistance upon Discharge: family able to assist     Pain/Comfort:  · Pain Rating 1: 6/10  · Location - Orientation 1: generalized  · Location 1: back  · Pain Addressed 1: Reposition, Distraction    Patients cultural, spiritual, Congregational conflicts given the current situation: no    Objective:     Communicated with: RN prior to session.  Patient found supine with telemetry, peripheral IV, CPM upon OT entry to room.    General Precautions: Standard, fall   Orthopedic Precautions:N/A   Braces: N/A     Occupational Performance:    Bed Mobility:    · Patient completed Rolling/Turning to Right with contact guard assistance  · Patient completed Scooting/Bridging with contact guard assistance  · Patient completed Supine  to Sit with contact guard assistance  · Patient completed Sit to Supine with contact guard assistance    Functional Mobility/Transfers:  · Patient completed Sit <> Stand Transfer with contact guard assistance  with  rolling walker   · Patient completed Bed <> Chair Transfer using Step Transfer technique with contact guard assistance with rolling walker    Activities of Daily Living:  · Upper Body Dressing: contact guard assistance    · Lower Body Dressing: minimum assistance    · Toileting: minimum assistance      Cognitive/Visual Perceptual:  Cognitive/Psychosocial Skills:     -       Oriented to: Person, Place, Time and Situation   -       Follows Commands/attention:Follows one-step commands  -       Communication: expressive aphasia and dysarthria  -       Memory: No Deficits noted  -       Safety awareness/insight to disability: intact   -       Mood/Affect/Coping skills/emotional control: Appropriate to situation    Physical Exam:  Upper Extremity Range of Motion:     -       Right Upper Extremity: WFL  -       Left Upper Extremity: WFL  Upper Extremity Strength:    -       Right Upper Extremity: WFL  -       Left Upper Extremity: WFL    AMPAC 6 Click ADL:  AMPAC Total Score: 19    Treatment & Education:  Evaluation complete and goals set.  Pt educated on safety, role of OT, importance of increased participation in self care for gains , expectations for participation, expectations for gains, POC, energy conservation, caregiver strain. White board updated.     Education:    Patient left supine with all lines intact    GOALS:   Multidisciplinary Problems     Occupational Therapy Goals     Not on file          Multidisciplinary Problems (Resolved)        Problem: Occupational Therapy Goal    Goal Priority Disciplines Outcome Interventions   Occupational Therapy Goal   (Resolved)     OT, PT/OT Met                    History:     Past Medical History:   Diagnosis Date    Acid reflux     Depression     Muscle  spasm     Pain     Tremors of nervous system        History reviewed. No pertinent surgical history.    Time Tracking:     OT Date of Treatment: 12/10/19  OT Start Time: 0820  OT Stop Time: 0835  OT Total Time (min): 15 min    Billable Minutes:Evaluation 15    Ricarda Gross, OT  12/10/2019

## 2019-12-10 NOTE — ASSESSMENT & PLAN NOTE
Insidious-onset, progressive, to solids only  Likely secondary to abnormal motor coordination or muscle spasms     - Recommended SLP evaluation who recommended regular diet with thin liquids  - Recommend obtaining MBSS to evaluate esophageal involvement

## 2019-12-10 NOTE — PLAN OF CARE
Patient encouraged to utilized call bell for assistance with needs. Safety measures implemented. No acute distress noted. Neuro checks monitored every 2 hours. Patient stable.

## 2019-12-10 NOTE — PT/OT/SLP EVAL
"Speech Language Pathology Evaluation  Bedside Swallow    Patient Name:  Storm Hinds   MRN:  [id number]  Admitting Diagnosis: Abnormal involuntary movements    Recommendations:                 General Recommendations:  Dysphagia therapy, Speech language evaluation and Cognitive-linguistic evaluation; GI consult  Diet recommendations:  Regular, Thin   Aspiration Precautions: Alternating bites/sips, HOB to 90 degrees, No straws and Small bites/sips   General Precautions: Standard, aspiration  Communication strategies:  none    History:     Past Medical History:   Diagnosis Date    Acid reflux     Depression     Muscle spasm     Pain     Tremors of nervous system        History reviewed. No pertinent surgical history.    Social History: Patient lives with his wife.    Prior diet: regular?thin but noted difficulty with solids recently        Subjective     " I have to sit all the way upright or I am worried it will be hard to swallow"  Patient goals: to get better    Pain/Comfort:  · Pain Rating 1: 6/10  · Location 1: back  · Pain Addressed 1: Reposition, Nurse notified, Distraction  · Pain Rating Post-Intervention 1: 6/10    Objective:     Oral Musculature Evaluation  · Oral Musculature: WFL  · Dentition: present and adequate  · Mucosal Quality: adequate  · Mandibular Strength and Mobility: WFL  · Oral Labial Strength and Mobility: WFL  · Lingual Strength and Mobility: WFL  · Buccal Strength and Mobility: WFL  · Volitional Cough: adequate  · Volitional Swallow: strong  · Voice Prior to PO Intake: clear  · Oral Musculature Comments: Pt with clenched jaw     Bedside Swallow Eval:   Consistencies Assessed:  · Thin liquids 2 cups of water  · Puree 6 teaspoons of puree  · Solids cracker     Oral Phase:   · Prolonged mastication    Pharyngeal Phase:   · 2-3 swallows per bolus  · no overt clinical signs/symptoms of pharyngeal dysphagia    Compensatory Strategies  · None    Treatment: Pt reported difficulty with " solids and denied any difficulty with liquids. He reports that he must sit fully upright or food will get stuck. Pt and wife denied any coughing/choking during meals. Pt reported he has been taking a reflux medicine since September but is not taking it currently at the hospital. Pt reported difficulty with hamburgers and hotdogs. He denied weight loss and reported actual weight gain. Per wife, pt eating more junk foods because he feels he can eat that easier and he is also not moving as much. He keeps his jaw clenched and talks with his teeth closed. Pt also with whole body tremors/shaking noted. Pt reported pain in his jaw on the left side when asked to open his mouth. Pt and wife denied any changes in voice but reported intermittent dysarthria. Pt was noted to have intermittent strained/strangled vocal quality. Pt with noted struggle to perform oral motor task and when forming words. Staccato speech noted as well. Pt denied word finding and reported more apraxia issues. Pt with no overt s/s of aspiration noted and no globus sensation. Pt did require 2-3 swallows per bolus and liquid wash helped after solids. Also observed pt swallowing pills one a time. Given increased time and more liquids, pt tolerated all pills. Discussed s/s of aspiration and safety precautions when eating. Pt and wife agreed pt would prefer to stay on a regular diet and choose softer foods that would work for pt as he is very picky eater. Discussed that pills can be taken one at a time and larger pills could be buried in pudding if needed. Pt and family with good understanding of information. May consider GI consult to further eval as pt's complaints c/w esophageal issues. White board updated. Will request speech lang eval as not ordered at this time.   Assessment:     Storm Hinds is a 39 y.o. male with an SLP diagnosis of Dysphagia.  He presents with no overt s/s of aspiration and deficits appear c/w esophageal difficulty.     Goals:    Multidisciplinary Problems     SLP Goals        Problem: SLP Goal    Goal Priority Disciplines Outcome   SLP Goal     SLP Ongoing, Progressing   Description:  Goals to be met 12/17  1 pt will tolerate regular diet and thin liquids  2 pt will participate in speech lang eval                     Plan:     · Patient to be seen:  4 x/week   · Plan of Care expires:     · Plan of Care reviewed with:  patient, spouse   · SLP Follow-Up:  Yes       Discharge recommendations:  other (see comments)(tbd)       Time Tracking:     SLP Treatment Date:   12/10/19  Speech Start Time:  0920  Speech Stop Time:  0937     Speech Total Time (min):  17 min    Billable Minutes: Eval Swallow and Oral Function 17    QUINTEN Griffiths, CCC-SLP  12/10/2019

## 2019-12-10 NOTE — ASSESSMENT & PLAN NOTE
Young otherwise healthy M with progressive spasticity in LLE (more noticeable x3 months) and striatal L toe, painful muscle spasms (L>R), BUE tremors, BLE involuntary movements, spastic speech and esophageal dysphagia, as well as sensory changes, which does not localize to a specific location or category of neurological disorder. This and the rapidity of progression is worrisome for a toxic (heavy metals,..) vs infectious (Lyme's, Tuberculosis, HIV,...) vs paraneoplastic vs hereditary (Jourdan's, Neuroacanthocytosis,..) phenomenon.      - Please continue trihexyphenidyl to 5 mg BID + Sinemet 25 -100 mg TID for some symptomatic relief in the meanwhile that diagnostic work up is being carried on  - Obtain MRI of C/T/L spine w/wo contrast  - Obtain RPR, HIV 1/2 ab, TB, Lyme Ab, GABRIEL, ACE, Heavy metal screen, Copper, Ceruloplasmin, SPEP/UPEP/DELILAH, Vitamin E, Acanthocytes in PBS, very long chain fatty acids  - Obtain a routine EEG to exclude epileptic component to the rhythmic repetitive movements of L foot  - Obtain CT scan of C/A/P for investigating underlying malignancy   - If initial work up is inconclusive we will proceed with LP  - Will follow up on paraneoplastic panel in serum and the rest of labs

## 2019-12-10 NOTE — HOSPITAL COURSE
The patient will be admitted for neurological workup of dystonia and expressive aphasia. MRI barbra ordered, ESR, CRP, paraneoplastic panel, SLP, resp mechanics, TSH, will be started on sinemet and trihexylphenidyl. 12/10 Patient tremors, bradykinesia, speech difficulty improved since sinemet started. Thyroid labs and ESR/CRP within normal limits. Respiratory mechanics NIF of -50 and VC of 1750ml. [place] of labs ordered to rule out metal or tick exposure, autoimmune causes, Pan imaging ordered.     Pt underwent successful lumbar puncture 12/11 in addition to MRI and CT as part of ongoing work up for his movement disorder. Imaging and ongoing labs unremarkable thus far, although patient improving substantially on sinemet and artane. Will discharge for follow up with PCP and neurology while labs are pending.

## 2019-12-10 NOTE — CARE UPDATE
Respiratory mechanics performed on patient.  Patient achieved a NIF of -50 and VC of 1750ml.  Patient demonstrated good effort.  Will continue to monitor.

## 2019-12-10 NOTE — PT/OT/SLP EVAL
Physical Therapy Evaluation    Patient Name:  Storm Hinds   MRN:  [id number]    Recommendations:     Discharge Recommendations:  home with home health   Discharge Equipment Recommendations: none   Barriers to discharge: decreased functional mobility requiring increased assist      Assessment:     Storm Hinds is a 39 y.o. male admitted with a medical diagnosis of Abnormal involuntary movements.  He presents with the following impairments/functional limitations:  weakness, impaired functional mobilty, impaired endurance, gait instability, decreased coordination, pain, impaired fine motor, impaired balance, impaired self care skills. Pt presenting with impairments in functional mobility resulting in an increase fall risk. Pt demonstrating difficulty verbalizing and reports aphasic symptoms as he is aware of what he wants to say but has difficulty expressing his words. Pt additionally has difficulty initiating movement and once performed demonstrates intention tremors throughout primarily isolated to L side of his body. Pt is ataxic with all OOB mobility ambulating with LLE heel walking patter. Pt reports he is unable to fully weight bear in LLE as it increases tremors. Therapy will cont. To follow x3 day/wk with initial discharge rec's for home with HH but pending further diagnosis as pt reports he does not have a definitive diagnosis as of yet.       Rehab Prognosis: Fair; patient would benefit from acute skilled PT services to address these deficits and reach maximum level of function.    Recent Surgery: * No surgery found *      Plan:     During this hospitalization, patient to be seen 3 x/week to address the identified rehab impairments via gait training, therapeutic activities, therapeutic exercises, neuromuscular re-education and progress toward the following goals:    · Plan of Care Expires:  01/10/20    Subjective       Pain/Comfort:  · Pain Rating 1: 6/10  · Location - Orientation 1:  generalized  · Location 1: back    Patients cultural, spiritual, Congregation conflicts given the current situation: no    Living Environment:  Pt lives with wife and children in [facility] with 2 MILO and bilat handrails   Prior to admission, patients level of function was Ind until recently where he now requires RW for amb and assist with dressing.  Equipment used at home: walker, rolling, cane, quad, shower chair.  DME owned (not currently used): none.  Upon discharge, patient will have assistance from family.    Objective:     Communicated with NSG prior to session.  Patient found supine with telemetry, peripheral IV, SCD  upon PT entry to room.    General Precautions: Standard, fall   Orthopedic Precautions:N/A   Braces: N/A     Exams:  · RLE ROM: WFL  · RLE Strength: WFL  · LLE ROM: WFL  · LLE Strength: Hip- 3+/5      Knee- 4+/5      Ankle- 3/5    Functional Mobility:  · Bed Mobility:     · Scooting: stand by assistance  · Supine to Sit: stand by assistance  · Transfers:     · Sit to Stand:  minimum assistance and posterior lean noted initially  with rolling walker  · Gait: 15ft CGA with RW demonstrating ataxia, wide SUSAN, and LLE heel walking pattern   · Balance: Sitting: Sup.       Standing: CGA      Therapeutic Activities and Exercises:   - Pt educated on:   -PT roles, expectations, and POC    -Safety with mobility   -Benefits of OOB activities to increase strength and functional mobility    -Performing ther ex for increasing LE ROM and strength   -Discharge recommendations     AM-PAC 6 CLICK MOBILITY  Total Score:17     Patient left up in chair with call button in reach.    GOALS:   Multidisciplinary Problems     Physical Therapy Goals        Problem: Physical Therapy Goal    Goal Priority Disciplines Outcome Goal Variances Interventions   Physical Therapy Goal     PT, PT/OT Ongoing, Progressing     Description:  Goals to be met by: 1/10/2020    Patient will increase functional independence with mobility by  performin. Supine to sit with Modified [place]  2. Sit to supine with Modified [place]  3. Sit to stand transfer with Supervision  4. Gait  x 30 feet with Supervision using LRAD  5. Lower extremity exercise program x15 reps per handout, with independence                     History:     Past Medical History:   Diagnosis Date    Acid reflux     Depression     Muscle spasm     Pain     Tremors of nervous system        History reviewed. No pertinent surgical history.    Time Tracking:     PT Received On: 12/10/19  PT Start Time: 821     PT Stop Time: 45  PT Total Time (min): 24 min     Billable Minutes: Evaluation 10 and Gait Training 14      Georges Chappell, PT  12/10/2019

## 2019-12-10 NOTE — SUBJECTIVE & OBJECTIVE
Past Medical History:   Diagnosis Date    Acid reflux     Depression     Muscle spasm     Pain     Tremors of nervous system        History reviewed. No pertinent surgical history.    Review of patient's allergies indicates:  No Known Allergies    No current facility-administered medications on file prior to encounter.      Current Outpatient Medications on File Prior to Encounter   Medication Sig    baclofen (LIORESAL) 5 mg Tab tablet Take 1 tablet (5 mg total) by mouth 3 (three) times daily.    buPROPion (WELLBUTRIN XL) 150 MG TB24 tablet Take 1 tablet (150 mg total) by mouth once daily.    diclofenac sodium (VOLTAREN) 1 % Gel Apply 2 g topically 3 (three) times daily as needed.    gabapentin (NEURONTIN) 300 MG capsule TAKE 1 CAPSULE BY MOUTH ONCE DAILY IN THE EVENING    LORazepam (ATIVAN) 1 MG tablet Take 1 tablet (1 mg total) by mouth every 12 (twelve) hours as needed for Anxiety.    ranitidine (ZANTAC) 300 MG tablet Take 1 tablet (300 mg total) by mouth every evening.    trihexyphenidyl (ARTANE) 2 MG tablet Take 1 tablet (2 mg total) by mouth 4 (four) times daily with meals and nightly. (2 mg three times daily x 1 wk, if well-tolerated increase)     Family History     Problem Relation (Age of Onset)    Cancer Father    Hypertension Mother, Paternal Grandmother, Paternal Grandfather    Stroke Mother        Tobacco Use    Smoking status: Former Smoker    Smokeless tobacco: Former User   Substance and Sexual Activity    Alcohol use: No     Comment: use to    Drug use: No    Sexual activity: Not on file     Review of Systems   Constitutional: Positive for activity change. Negative for appetite change, chills, fatigue and fever.   HENT: Positive for trouble swallowing. Negative for sore throat.    Eyes: Negative for photophobia and visual disturbance.   Respiratory: Negative for cough and shortness of breath.    Cardiovascular: Negative for chest pain and palpitations.   Gastrointestinal: Negative  for abdominal distention, abdominal pain, blood in stool, constipation, diarrhea, nausea and vomiting.   Genitourinary: Positive for difficulty urinating. Negative for flank pain, frequency and hematuria.   Musculoskeletal: Negative for myalgias.   Skin: Negative for color change and pallor.   Neurological: Positive for tremors, speech difficulty, weakness and numbness. Negative for dizziness and headaches.   Psychiatric/Behavioral: Negative for agitation and behavioral problems. The patient is nervous/anxious.      Objective:     Vital Signs (Most Recent):  Temp: 97.1 °F (36.2 °C) (12/09/19 2135)  Pulse: 74 (12/09/19 2135)  Resp: 18 (12/09/19 2135)  BP: 103/71 (12/09/19 2135)  SpO2: 98 % (12/09/19 2135) Vital Signs (24h Range):  Temp:  [97.1 °F (36.2 °C)-98.2 °F (36.8 °C)] 97.1 °F (36.2 °C)  Pulse:  [] 74  Resp:  [13-20] 18  SpO2:  [94 %-98 %] 98 %  BP: (103-146)/(58-84) 103/71     Weight: 95.3 kg (210 lb)  Body mass index is 28.48 kg/m².    Physical Exam   Constitutional: He is oriented to person, place, and time. He appears well-developed and well-nourished. He appears distressed.   HENT:   Head: Normocephalic and atraumatic.   Eyes: EOM are normal. No scleral icterus.   Neck: Neck supple. No tracheal deviation present.   Cardiovascular: Normal rate, regular rhythm and normal heart sounds.   No murmur heard.  Pulmonary/Chest: Breath sounds normal. He has no wheezes. He has no rales.   Abdominal: Soft. Bowel sounds are normal. He exhibits no distension. There is no tenderness.   Musculoskeletal: He exhibits deformity. He exhibits no edema.   Neurological: He is alert and oriented to person, place, and time. He exhibits abnormal muscle tone. Coordination abnormal.   Expressive aphasia.  Dorsiflexion 3/5 and plantar flexion 4/5 on LLE, tremor noted bilateral LE, L>R, 1st digit dorsiflexed,   RUE  weakness, rigidity on UE    Skin: Skin is warm and dry.   Psychiatric: His behavior is normal. His mood appears  anxious. His speech is slurred.         CRANIAL NERVES     CN III, IV, VI   Extraocular motions are normal.        Significant Labs:   Recent Lab Results       12/09/19  1425   12/09/19  1251        Albumin   4.1     Alkaline Phosphatase   81     ALT   29     Anion Gap   10     Appearance, UA Clear       AST   25     Baso #   0.03     Basophil%   0.4     Bilirubin (UA) Negative       BILIRUBIN TOTAL   0.5  Comment:  For infants and newborns, interpretation of results should be based  on gestational age, weight and in agreement with clinical  observations.  Premature Infant recommended reference ranges:  Up to 24 hours.............<8.0 mg/dL  Up to 48 hours............<12.0 mg/dL  3-5 days..................<15.0 mg/dL  6-29 days.................<15.0 mg/dL       BNP   <10  Comment:  Values of less than 100 pg/ml are consistent with non-CHF populations.     BUN, Bld   14     Calcium   9.6     Chloride   105     CO2   25     Color, UA Yellow       Creatinine   0.9     Differential Method   Automated     eGFR if    >60.0     eGFR if non    >60.0  Comment:  Calculation used to obtain the estimated glomerular filtration  rate (eGFR) is the CKD-EPI equation.        Eos #   0.4     Eosinophil%   4.6     Glucose   112     Glucose, UA Negative       Gran # (ANC)   5.1     Gran%   62.2     Hematocrit   40.9     Hemoglobin   13.9     Immature Grans (Abs)   0.03  Comment:  Mild elevation in immature granulocytes is non specific and   can be seen in a variety of conditions including stress response,   acute inflammation, trauma and pregnancy. Correlation with other   laboratory and clinical findings is essential.       Immature Granulocytes   0.4     Ketones, UA Negative       Leukocytes, UA Negative       Lymph #   2.1     Lymph%   25.2     MCH   29.4     MCHC   34.0     MCV   87     Mono #   0.6     Mono%   7.2     MPV   9.5     NITRITE UA Negative       nRBC   0     Occult Blood UA Negative        pH, UA 7.0       Platelets   284     Potassium   3.9     PROTEIN TOTAL   7.6     Protein, UA Negative  Comment:  Recommend a 24 hour urine protein or a urine   protein/creatinine ratio if globulin induced proteinuria is  clinically suspected.         RBC   4.72     RDW   12.8     Sodium   140     Specific Gravity, UA 1.015       Specimen UA Urine, Clean Catch       Troponin I   <0.006  Comment:  The reference interval for Troponin I represents the 99th percentile   cutoff   for our facility and is consistent with 3rd generation assay   performance.       WBC   8.18           Significant Imaging: I have reviewed and interpreted all pertinent imaging results/findings within the past 24 hours.

## 2019-12-10 NOTE — H&P
[facility] Hospital Medicine  History & Physical    Patient Name: Storm Hinsd  MRN: [id number]  Admission Date: 12/9/2019  Attending Physician: Mariela Morgan MD   Primary Care Provider: Elliott Wasserman MD    [facility] Medicine Team: [facility] 3 Celso Chacon DO     Patient information was obtained from patient, spouse/SO, past medical records and ER records.     Subjective:     Principal Problem:Dystonia    Chief Complaint:   Chief Complaint   Patient presents with    Altered Mental Status     Pt c/o confusion since Thursday with gradual worsening.  Pt also c/o left leg pain & weakness.  Pt was seen on Thursday at [facility] for same.         HPI: The patient is a 39 year old male with past medical history MDD, reflux, smoking, recent LLE dystonia onset, presenting for worsening of recent symptoms and new speech difficulty. The patient's history is aided by significant other. The patient was his son's football game in September when he suddenly experienced pain and spasms in his left leg. The patient symptoms have been gradually worsening since then. Starting 4 days ago the patient started experiencing difficulty with speech when working with a rehab therapist. The problem waxes and wanes but overall worsening since then. In association he has difficulty with swallowing. Between squeezing out short clear audible phrases on questioning, the patient's face, jaw, and neck clenches. Sometimes the patient has word finding difficulty. The patient denies any loss of bowel or urinary control, but reports some decreased urine output possibly attributed to him by decreased po intake. The patient has seen multiple neurologists who have considered early onset Parkinson's and start ropinirole at one time without reported success. Trihexylphenidyl was started with possible mild improvement of symptoms.     Patient in the past smoked up to 3 packs per day of tobacco quitting 10 years ago. He  travels to [place] at times within the past year and has possibly seen tic on clothes but denies any rash or noting tic directly on skin. He does not maintain recent alcohol drinking. He denies any family history of neurological or autoimmune disorders, but father had leukemia. Denies fever or chills.    Past Medical History:   Diagnosis Date    Acid reflux     Depression     Muscle spasm     Pain     Tremors of nervous system        History reviewed. No pertinent surgical history.    Review of patient's allergies indicates:  No Known Allergies    No current facility-administered medications on file prior to encounter.      Current Outpatient Medications on File Prior to Encounter   Medication Sig    baclofen (LIORESAL) 5 mg Tab tablet Take 1 tablet (5 mg total) by mouth 3 (three) times daily.    buPROPion (WELLBUTRIN XL) 150 MG TB24 tablet Take 1 tablet (150 mg total) by mouth once daily.    diclofenac sodium (VOLTAREN) 1 % Gel Apply 2 g topically 3 (three) times daily as needed.    gabapentin (NEURONTIN) 300 MG capsule TAKE 1 CAPSULE BY MOUTH ONCE DAILY IN THE EVENING    LORazepam (ATIVAN) 1 MG tablet Take 1 tablet (1 mg total) by mouth every 12 (twelve) hours as needed for Anxiety.    ranitidine (ZANTAC) 300 MG tablet Take 1 tablet (300 mg total) by mouth every evening.    trihexyphenidyl (ARTANE) 2 MG tablet Take 1 tablet (2 mg total) by mouth 4 (four) times daily with meals and nightly. (2 mg three times daily x 1 wk, if well-tolerated increase)     Family History     Problem Relation (Age of Onset)    Cancer Father    Hypertension Mother, Paternal Grandmother, Paternal Grandfather    Stroke Mother        Tobacco Use    Smoking status: Former Smoker    Smokeless tobacco: Former User   Substance and Sexual Activity    Alcohol use: No     Comment: use to    Drug use: No    Sexual activity: Not on file     Review of Systems   Constitutional: Positive for activity change. Negative for appetite  change, chills, fatigue and fever.   HENT: Positive for trouble swallowing. Negative for sore throat.    Eyes: Negative for photophobia and visual disturbance.   Respiratory: Negative for cough and shortness of breath.    Cardiovascular: Negative for chest pain and palpitations.   Gastrointestinal: Negative for abdominal distention, abdominal pain, blood in stool, constipation, diarrhea, nausea and vomiting.   Genitourinary: Positive for difficulty urinating. Negative for flank pain, frequency and hematuria.   Musculoskeletal: Negative for myalgias.   Skin: Negative for color change and pallor.   Neurological: Positive for tremors, speech difficulty, weakness and numbness. Negative for dizziness and headaches.   Psychiatric/Behavioral: Negative for agitation and behavioral problems. The patient is nervous/anxious.      Objective:     Vital Signs (Most Recent):  Temp: 97.1 °F (36.2 °C) (12/09/19 2135)  Pulse: 74 (12/09/19 2135)  Resp: 18 (12/09/19 2135)  BP: 103/71 (12/09/19 2135)  SpO2: 98 % (12/09/19 2135) Vital Signs (24h Range):  Temp:  [97.1 °F (36.2 °C)-98.2 °F (36.8 °C)] 97.1 °F (36.2 °C)  Pulse:  [] 74  Resp:  [13-20] 18  SpO2:  [94 %-98 %] 98 %  BP: (103-146)/(58-84) 103/71     Weight: 95.3 kg (210 lb)  Body mass index is 28.48 kg/m².    Physical Exam   Constitutional: He is oriented to person, place, and time. He appears well-developed and well-nourished. He appears distressed.   HENT:   Head: Normocephalic and atraumatic.   Eyes: EOM are normal. No scleral icterus.   Neck: Neck supple. No tracheal deviation present.   Cardiovascular: Normal rate, regular rhythm and normal heart sounds.   No murmur heard.  Pulmonary/Chest: Breath sounds normal. He has no wheezes. He has no rales.   Abdominal: Soft. Bowel sounds are normal. He exhibits no distension. There is no tenderness.   Musculoskeletal: He exhibits deformity. He exhibits no edema.   Neurological: He is alert and oriented to person, place, and  time. He exhibits abnormal muscle tone. Coordination abnormal.   Expressive aphasia.  Dorsiflexion 3/5 and plantar flexion 4/5 on LLE, tremor noted bilateral LE, L>R, 1st digit dorsiflexed,   RUE  weakness, rigidity on UE    Skin: Skin is warm and dry.   Psychiatric: His behavior is normal. His mood appears anxious. His speech is slurred.         CRANIAL NERVES     CN III, IV, VI   Extraocular motions are normal.        Significant Labs:   Recent Lab Results       12/09/19  1425   12/09/19  1251        Albumin   4.1     Alkaline Phosphatase   81     ALT   29     Anion Gap   10     Appearance, UA Clear       AST   25     Baso #   0.03     Basophil%   0.4     Bilirubin (UA) Negative       BILIRUBIN TOTAL   0.5  Comment:  For infants and newborns, interpretation of results should be based  on gestational age, weight and in agreement with clinical  observations.  Premature Infant recommended reference ranges:  Up to 24 hours.............<8.0 mg/dL  Up to 48 hours............<12.0 mg/dL  3-5 days..................<15.0 mg/dL  6-29 days.................<15.0 mg/dL       BNP   <10  Comment:  Values of less than 100 pg/ml are consistent with non-CHF populations.     BUN, Bld   14     Calcium   9.6     Chloride   105     CO2   25     Color, UA Yellow       Creatinine   0.9     Differential Method   Automated     eGFR if    >60.0     eGFR if non    >60.0  Comment:  Calculation used to obtain the estimated glomerular filtration  rate (eGFR) is the CKD-EPI equation.        Eos #   0.4     Eosinophil%   4.6     Glucose   112     Glucose, UA Negative       Gran # (ANC)   5.1     Gran%   62.2     Hematocrit   40.9     Hemoglobin   13.9     Immature Grans (Abs)   0.03  Comment:  Mild elevation in immature granulocytes is non specific and   can be seen in a variety of conditions including stress response,   acute inflammation, trauma and pregnancy. Correlation with other   laboratory and clinical  findings is essential.       Immature Granulocytes   0.4     Ketones, UA Negative       Leukocytes, UA Negative       Lymph #   2.1     Lymph%   25.2     MCH   29.4     MCHC   34.0     MCV   87     Mono #   0.6     Mono%   7.2     MPV   9.5     NITRITE UA Negative       nRBC   0     Occult Blood UA Negative       pH, UA 7.0       Platelets   284     Potassium   3.9     PROTEIN TOTAL   7.6     Protein, UA Negative  Comment:  Recommend a 24 hour urine protein or a urine   protein/creatinine ratio if globulin induced proteinuria is  clinically suspected.         RBC   4.72     RDW   12.8     Sodium   140     Specific Gravity, UA 1.015       Specimen UA Urine, Clean Catch       Troponin I   <0.006  Comment:  The reference interval for Troponin I represents the 99th percentile   cutoff   for our facility and is consistent with 3rd generation assay   performance.       WBC   8.18           Significant Imaging: I have reviewed and interpreted all pertinent imaging results/findings within the past 24 hours.    Assessment/Plan:     * Dystonia    The patient is a 39 year old male with past medical history of smoking, chronic low back pain, HLD, recent LLE dystonia, presenting for worsening expressive aphasia for 4 days. The patient had first onset of LLE dystonia in 9/2019, now progressing with spasticity, with acute onset of aphasia this past Thursday proma. The patient has seen multiple neurologists, trying ropinirole without improvement, and has recently started trihexyphenidyl after 11/11 neurology appt. Patient possibly may have responded well to artane. Patient with smoking history, [place] travel with tick seen on clothes, father had leukemia. No neurological illness in family. He was not able to afford FIONA scan suggested by neurologist. Neurology consulted, differential including PD, PERM syndrome, partial SPS, paraneoplastic SPS.    PLAN  -neurology following, recs appreciated  -Sinemet  TID  ordered  -trihexylphenidyl 6mg with breakfast, 4mg with lunch and dinner per available pharmacy dosing  -ESR, CRP, TSH, Anti-TPO, paraneoplastic panel w Anti-RADHA and Anti-amphiphysin Ab  -SLP consult, NPO until evaluated for oropharyngieal dysphagia  -VC and NIF resp mechanics ordered  -PT/OT for gait  -patient my be candidate for botox for LLE dystonia in future    Oropharyngeal dysphagia  See dystonia      Abnormality of gait and mobility  See dystonia        VTE Risk Mitigation (From admission, onward)         Ordered     Place sequential compression device  Until discontinued      12/09/19 1262                   Celso Chacon DO  Department of Hospital Medicine   [facility]

## 2019-12-10 NOTE — SUBJECTIVE & OBJECTIVE
Subjective:     Brief HPI:  A 38 y/o M with no significant PMHx, p/w progressive spasticity in LLE (more noticeable x3 months), painful muscle spasms, generalized tremors, with progressive worsening, now involving motor component of speech and swallowing. He also reports intermittent diaphoresis, lightheadedness, blurred vision, and difficulty urinating. Family Hx positive for cancer, and patient has significant Hx of smoking and drinking. Unfortunately FIONA scan was not obtained due to high co-pay, MRI of Brain and C/L spine obtained in October without any finding that could justify the severity of symptoms. He was tried on Baclofen and Tizanidine with mild response (limited by drowsiness). He has responded moderately well to Artane and Ativan that were started recently.     Interval History:   12/10: The patient was started on sinemet last night. Artane was also increased to total of 10 mg daily (increase was not taken effect yet at the time of exam). Speech is improved this morning, unclear if it's in the setting of his usual fluctuations. Planning extensive for infectious, toxic and malignancy work up.    Current Neurological Medications: Baclofen, Bupropion, Gabapentin, Trihexyphenidyl, Carbidopa-levodopa    Current Facility-Administered Medications   Medication Dose Route Frequency Provider Last Rate Last Dose    acetaminophen suppository 325 mg  325 mg Rectal Q8H PRN Celso Chacon DO        baclofen split tablet 5 mg  5 mg Oral TID Celso Chacon DO   5 mg at 12/09/19 2129    buPROPion TB24 tablet 150 mg  150 mg Oral Daily Celso Chacon DO        carbidopa-levodopa  mg per tablet 1 tablet  1 tablet Oral TID Celso Chacon DO   1 tablet at 12/09/19 2129    dextrose 10% (D10W) Bolus  12.5 g Intravenous PRN Mariela Morgan MD        dextrose 10% (D10W) Bolus  25 g Intravenous PRN Mariela Morgan MD        diclofenac sodium 1 % gel 2 g  2 g Topical (Top) Daily Celso Chacon DO        famotidine  tablet 20 mg  20 mg Oral BID Celso Chacon, DO   20 mg at 12/09/19 2129    gabapentin capsule 300 mg  300 mg Oral Daily Celso Chacon, DO        glucagon (human recombinant) injection 1 mg  1 mg Intramuscular PRN Celso Chacon, DO        glucose chewable tablet 16 g  16 g Oral PRN Celso Chacon, DO        glucose chewable tablet 24 g  24 g Oral PRN Celso Chacon, DO        LORazepam tablet 1 mg  1 mg Oral Q12H PRN Celso Chacon, DO        morphine injection 1 mg  1 mg Intravenous Q6H PRN Celso Chacon, DO        sodium chloride 0.9% flush 10 mL  10 mL Intravenous PRN Celso Chacon, DO        trihexyphenidyl tablet 2 mg  2 mg Oral TID WM Celso Chacon, DO   2 mg at 12/09/19 2131    trihexyphenidyl tablet 4 mg  4 mg Oral Daily with breakfast Celso Chacon, DO           Review of Systems   Constitutional: Positive for activity change. Negative for chills and fever.   HENT: Positive for trouble swallowing. Negative for drooling.    Eyes: Positive for visual disturbance. Negative for photophobia.   Respiratory: Negative for choking and shortness of breath.    Cardiovascular: Negative for chest pain and palpitations.   Gastrointestinal: Negative for abdominal pain, nausea and vomiting.   Genitourinary: Positive for decreased urine volume and difficulty urinating.   Musculoskeletal: Positive for arthralgias, gait problem, myalgias and neck pain. Negative for neck stiffness.   Skin: Negative for color change and rash.   Allergic/Immunologic: Negative for immunocompromised state.   Neurological: Positive for tremors, speech difficulty, weakness and numbness. Negative for dizziness, seizures and facial asymmetry.   Psychiatric/Behavioral: Negative for agitation, confusion, dysphoric mood and hallucinations.     Objective:     Vital Signs (Most Recent):  Temp: 96.8 °F (36 °C) (12/10/19 0441)  Pulse: 72 (12/10/19 0441)  Resp: 17 (12/10/19 0441)  BP: 113/64 (12/10/19 0441)  SpO2: (!) 94 % (12/10/19 0441) Vital Signs  (24h Range):  Temp:  [96.1 °F (35.6 °C)-98.2 °F (36.8 °C)] 96.8 °F (36 °C)  Pulse:  [] 72  Resp:  [13-20] 17  SpO2:  [94 %-98 %] 94 %  BP: (103-146)/(58-84) 113/64     Weight: 95.3 kg (210 lb)  Body mass index is 28.48 kg/m².    Physical Exam   Constitutional: He is oriented to person, place, and time. He appears well-developed and well-nourished. He is cooperative.   Young male in mild distress when trying to speak   HENT:   Head: Normocephalic and atraumatic.   Eyes: Pupils are equal, round, and reactive to light. EOM are normal.   Neck: Normal range of motion. Muscular tenderness present. No neck rigidity. Normal range of motion present.   Cardiovascular: Normal rate.   Pulmonary/Chest: Effort normal. No tachypnea. No respiratory distress.   Mildly short of breath with exertion during physical examination    Abdominal: Soft. Normal appearance. He exhibits no distension.   Musculoskeletal: He exhibits no edema.        Left foot: There is decreased range of motion and deformity.   Neurological: He is alert and oriented to person, place, and time. He displays no seizure activity.   Spastic speech  PERRL, no nystagmus  EOM intact but diplopia on midline gaze + R lateral gaze  Facial expression intact but sensation decreased on R lower face  Strength in glossal, masticatory muscles, and H&N muscles 5/5  No tongue or uvula deviation  L shoulder abduction 4/5 (painful ROM), LUE 4+/5 distally, normal tone  LLE 4/5 proximally, 3/5 dorsiflexion, 4/5 plantar flexion, increased tone (dystonic plantar flexion on L foot)  RUE and RLE 5/5, normal tone  Bradykinesia and dysdiadochokinesia on BUE  Pill-rolling tremor on LUE has subsided  Sensation to light touch decreased on R shoulder and arm + LLE  DTRs 2+ throughout except 3+ on LLE + ankle clonus  L striatal toe and R down going toes   Coordination has improved on BUE (L>R)  low frequency low amplitude tremor on BUE  deferred gait   Skin: Skin is warm. No rash noted. He  is not diaphoretic.   Psychiatric: He has a normal mood and affect. His behavior is normal. His speech is slurred. He is not actively hallucinating.       NEUROLOGICAL EXAMINATION:     MENTAL STATUS   Oriented to person, place, and time.   Speech: slurred     CRANIAL NERVES     CN III, IV, VI   Pupils are equal, round, and reactive to light.  Extraocular motions are normal.       Significant Labs:     CBC:   Recent Labs   Lab 12/09/19  1251   WBC 8.18   HGB 13.9*   HCT 40.9        CMP:   Recent Labs   Lab 12/09/19  1251   *      K 3.9      CO2 25   BUN 14   CREATININE 0.9   CALCIUM 9.6   PROT 7.6   ALBUMIN 4.1   BILITOT 0.5   ALKPHOS 81   AST 25   ALT 29   ANIONGAP 10   EGFRNONAA >60.0     Urine Studies:   Recent Labs   Lab 12/09/19  1425   COLORU Yellow   APPEARANCEUA Clear   PHUR 7.0   SPECGRAV 1.015   PROTEINUA Negative   GLUCUA Negative   KETONESU Negative   BILIRUBINUA Negative   OCCULTUA Negative   NITRITE Negative   LEUKOCYTESUR Negative     Pending labs:  Paraneoplastic panel in serum  Anti-TPO  ESR  CRP    Significant Imaging:   MRI Brain w/ w/o contrast (12/9/19):  No acute intracranial abnormality  Unchanged from prior exam    MRI C spine w/o contrast (09/23/19):  1. Multilevel degenerative disc disease from C3-C5 resulting in moderate bilateral neural foraminal narrowing.  2. Degenerative disc disease at C5-C6 resulting in mild bilateral neural foraminal narrowing.  3. Degenerative disc disease at C6-C7 resulting in mild central spinal canal stenosis with severe narrowing of the right neural foramen and moderate narrowing the left neural foramen.     MRI L spine w/o contrast (09/23/19):  1. Sacralization of L5.  2. Grade 1 retrolisthesis of L4 on L5.  3. Degenerative disc disease at L4-L5 with a small central focal disc protrusion resulting in mild central spinal canal stenosis with mild bilateral neural foraminal narrowing.  4. Signal abnormality within the posterior central  L4-L5 disc consistent with a small annular fissure.     MRI T spine w/o contrast (10/02/19):  Essentially normal MRI of the thoracic spine.     MRI Brain w/ w/o contrast (10/02/19):  No acute intracranial process. Normal appearing MRI of the Brain

## 2019-12-10 NOTE — SUBJECTIVE & OBJECTIVE
Past Medical History:   Diagnosis Date    Acid reflux     Depression     Muscle spasm     Pain     Tremors of nervous system        History reviewed. No pertinent surgical history.    Review of patient's allergies indicates:  No Known Allergies    No current facility-administered medications on file prior to encounter.      Current Outpatient Medications on File Prior to Encounter   Medication Sig    baclofen (LIORESAL) 5 mg Tab tablet Take 1 tablet (5 mg total) by mouth 3 (three) times daily. (Patient taking differently: Take 10 mg by mouth daily as needed. )    buPROPion (WELLBUTRIN XL) 150 MG TB24 tablet Take 1 tablet (150 mg total) by mouth once daily.    gabapentin (NEURONTIN) 300 MG capsule TAKE 1 CAPSULE BY MOUTH ONCE DAILY IN THE EVENING    LORazepam (ATIVAN) 1 MG tablet Take 1 tablet (1 mg total) by mouth every 12 (twelve) hours as needed for Anxiety. (Patient taking differently: Take 1 mg by mouth every 12 (twelve) hours. )    ranitidine (ZANTAC) 300 MG tablet Take 1 tablet (300 mg total) by mouth every evening.    trihexyphenidyl (ARTANE) 2 MG tablet Take 1 tablet (2 mg total) by mouth 4 (four) times daily with meals and nightly. (2 mg three times daily x 1 wk, if well-tolerated increase) (Patient taking differently: Take 2 mg by mouth 3 (three) times daily. )    diclofenac sodium (VOLTAREN) 1 % Gel Apply 2 g topically 3 (three) times daily as needed.     Family History     Problem Relation (Age of Onset)    Cancer Father    Hypertension Mother, Paternal Grandmother, Paternal Grandfather    Stroke Mother        Tobacco Use    Smoking status: Former Smoker    Smokeless tobacco: Former User   Substance and Sexual Activity    Alcohol use: No     Comment: use to    Drug use: No    Sexual activity: Not on file     Review of Systems   Constitutional: Positive for activity change. Negative for appetite change, chills, fatigue and fever.   HENT: Positive for trouble swallowing. Negative for sore  throat.    Eyes: Negative for photophobia and visual disturbance.   Respiratory: Negative for cough and shortness of breath.    Cardiovascular: Negative for chest pain and palpitations.   Gastrointestinal: Negative for abdominal distention, abdominal pain, blood in stool, constipation, diarrhea, nausea and vomiting.   Genitourinary: Positive for difficulty urinating. Negative for flank pain, frequency and hematuria.   Musculoskeletal: Negative for myalgias.   Skin: Negative for color change and pallor.   Neurological: Positive for tremors (improving), speech difficulty, weakness and numbness. Negative for dizziness and headaches.   Psychiatric/Behavioral: Negative for agitation and behavioral problems. The patient is nervous/anxious.      Objective:     Vital Signs (Most Recent):  Temp: 97.7 °F (36.5 °C) (12/10/19 1155)  Pulse: 77 (12/10/19 1155)  Resp: 16 (12/10/19 1155)  BP: 101/61 (12/10/19 1155)  SpO2: 97 % (12/10/19 1155) Vital Signs (24h Range):  Temp:  [95.9 °F (35.5 °C)-98.2 °F (36.8 °C)] 97.7 °F (36.5 °C)  Pulse:  [71-98] 77  Resp:  [14-20] 16  SpO2:  [94 %-98 %] 97 %  BP: (101-133)/(59-84) 101/61     Weight: 95.3 kg (210 lb)  Body mass index is 28.48 kg/m².      INTERVAL HISTORY: NAEO, Patient symptoms improving on sinemet and arnate, more labs and imaging ordered per neurology    Physical Exam   Constitutional: He is oriented to person, place, and time. He appears well-developed and well-nourished. He appears distressed (improved).   HENT:   Head: Normocephalic and atraumatic.   Eyes: EOM are normal. No scleral icterus.   Neck: Neck supple. No tracheal deviation present.   Cardiovascular: Normal rate, regular rhythm and normal heart sounds.   No murmur heard.  Pulmonary/Chest: Breath sounds normal. He has no wheezes. He has no rales.   Abdominal: Soft. Bowel sounds are normal. He exhibits no distension. There is no tenderness.   Musculoskeletal: He exhibits deformity. He exhibits no edema.   Neurological:  He is alert and oriented to person, place, and time. He exhibits abnormal muscle tone (improving). Coordination (improving) abnormal.   Expressive aphasia.  Dorsiflexion 3/5 and plantar flexion 4/5 on LLE, tremor noted bilateral LE, L>R, 1st digit dorsiflexed,   RUE  weakness, rigidity on UE    Skin: Skin is warm and dry.   Psychiatric: His behavior is normal. His mood appears anxious. His speech is slurred.         CRANIAL NERVES     CN III, IV, VI   Extraocular motions are normal.        Significant Labs:   Recent Lab Results       12/10/19  0720        Anion Gap 7     Baso # 0.02      0.02     Basophil% 0.3      0.3     BUN, Bld 14     Calcium 9.0     Chloride 104     CO2 28     Creatinine 1.0     CRP 2.4     Differential Method Automated      Automated     eGFR if African American >60.0     eGFR if non  >60.0  Comment:  Calculation used to obtain the estimated glomerular filtration  rate (eGFR) is the CKD-EPI equation.        Eos # 0.3      0.3     Eosinophil% 5.1      4.9     Glucose 92     Gran # (ANC) 3.6      3.6     Gran% 54.2      53.8     Hematocrit 39.1      39.9     Hemoglobin 13.1      13.0     Immature Grans (Abs) 0.02  Comment:  Mild elevation in immature granulocytes is non specific and   can be seen in a variety of conditions including stress response,   acute inflammation, trauma and pregnancy. Correlation with other   laboratory and clinical findings is essential.        0.02  Comment:  Mild elevation in immature granulocytes is non specific and   can be seen in a variety of conditions including stress response,   acute inflammation, trauma and pregnancy. Correlation with other   laboratory and clinical findings is essential.       Immature Granulocytes 0.3      0.3     Lymph # 2.1      2.2     Lymph% 31.2      32.1     MCH 29.8      29.2     MCHC 33.5      32.6     MCV 89      90     Mono # 0.6      0.6     Mono% 8.9      8.6     MPV 9.4      9.4     nRBC 0      0      Pathologist Review Review required     Platelets 267      265     Potassium 4.1     RBC 4.40      4.45     RDW 13.0      12.8     Sed Rate 7     Sodium 139     Thyroperoxidase Antibodies <6.0     TSH 0.744     WBC 6.61      6.76           Significant Imaging: I have reviewed and interpreted all pertinent imaging results/findings within the past 24 hours.

## 2019-12-11 LAB
ALBUMIN SERPL ELPH-MCNC: 3.99 G/DL (ref 3.35–5.55)
ALPHA1 GLOB SERPL ELPH-MCNC: 0.28 G/DL (ref 0.17–0.41)
ALPHA2 GLOB SERPL ELPH-MCNC: 0.64 G/DL (ref 0.43–0.99)
ANA SER QL IF: NORMAL
ANION GAP SERPL CALC-SCNC: 13 MMOL/L (ref 8–16)
ARSENIC BLD-MCNC: <1 NG/ML (ref 0–12)
B-GLOBULIN SERPL ELPH-MCNC: 0.9 G/DL (ref 0.5–1.1)
BASOPHILS # BLD AUTO: 0.03 K/UL (ref 0–0.2)
BASOPHILS NFR BLD: 0.4 % (ref 0–1.9)
BLOOD COLLECTION FOR MS PROFILE: NORMAL
BUN SERPL-MCNC: 16 MG/DL (ref 6–20)
CADMIUM BLD-MCNC: <0.2 NG/ML (ref 0–4.9)
CALCIUM SERPL-MCNC: 9.4 MG/DL (ref 8.7–10.5)
CHLORIDE SERPL-SCNC: 107 MMOL/L (ref 95–110)
CITY: NORMAL
CLARITY CSF: CLEAR
CLARITY CSF: CLEAR
CO2 SERPL-SCNC: 21 MMOL/L (ref 23–29)
COLOR CSF: COLORLESS
COLOR CSF: COLORLESS
COUNTY: NORMAL
CREAT SERPL-MCNC: 1.1 MG/DL (ref 0.5–1.4)
DIFFERENTIAL METHOD: ABNORMAL
EOSINOPHIL # BLD AUTO: 0.3 K/UL (ref 0–0.5)
EOSINOPHIL NFR BLD: 4.4 % (ref 0–8)
ERYTHROCYTE [DISTWIDTH] IN BLOOD BY AUTOMATED COUNT: 13 % (ref 11.5–14.5)
EST. GFR  (AFRICAN AMERICAN): >60 ML/MIN/1.73 M^2
EST. GFR  (NON AFRICAN AMERICAN): >60 ML/MIN/1.73 M^2
GAMMA GLOB SERPL ELPH-MCNC: 0.88 G/DL (ref 0.67–1.58)
GLUCOSE CSF-MCNC: 61 MG/DL (ref 40–70)
GLUCOSE SERPL-MCNC: 92 MG/DL (ref 70–110)
GUARDIAN FIRST NAME: NORMAL
GUARDIAN LAST NAME: NORMAL
HCT VFR BLD AUTO: 41.6 % (ref 40–54)
HGB BLD-MCNC: 13.7 G/DL (ref 14–18)
HIV 1+2 AB+HIV1 P24 AG SERPL QL IA: NEGATIVE
HOME PHONE: NORMAL
IMM GRANULOCYTES # BLD AUTO: 0.02 K/UL (ref 0–0.04)
IMM GRANULOCYTES NFR BLD AUTO: 0.3 % (ref 0–0.5)
INTERPRETATION SERPL IFE-IMP: NORMAL
LEAD BLD-MCNC: <1 MCG/DL (ref 0–4.9)
LYMPHOCYTES # BLD AUTO: 2.3 K/UL (ref 1–4.8)
LYMPHOCYTES NFR BLD: 33.6 % (ref 18–48)
LYMPHOCYTES NFR CSF MANUAL: 100 % (ref 40–80)
LYMPHOCYTES NFR CSF MANUAL: 89 % (ref 40–80)
MCH RBC QN AUTO: 29.5 PG (ref 27–31)
MCHC RBC AUTO-ENTMCNC: 32.9 G/DL (ref 32–36)
MCV RBC AUTO: 90 FL (ref 82–98)
MERCURY BLD-MCNC: <1 NG/ML (ref 0–9)
MONOCYTES # BLD AUTO: 0.6 K/UL (ref 0.3–1)
MONOCYTES NFR BLD: 8.6 % (ref 4–15)
MONOS+MACROS NFR CSF MANUAL: 11 % (ref 15–45)
NEUTROPHILS # BLD AUTO: 3.6 K/UL (ref 1.8–7.7)
NEUTROPHILS NFR BLD: 52.7 % (ref 38–73)
NRBC BLD-RTO: 0 /100 WBC
PATH REV BLD -IMP: NORMAL
PLATELET # BLD AUTO: 263 K/UL (ref 150–350)
PMV BLD AUTO: 9.3 FL (ref 9.2–12.9)
POTASSIUM SERPL-SCNC: 4.2 MMOL/L (ref 3.5–5.1)
PROT CSF-MCNC: 34 MG/DL (ref 15–40)
PROT SERPL-MCNC: 6.7 G/DL (ref 6–8.4)
RACE: NORMAL
RBC # BLD AUTO: 4.65 M/UL (ref 4.6–6.2)
RBC # CSF: 0 /CU MM
RBC # CSF: 1 /CU MM
RPR SER QL: NORMAL
SODIUM SERPL-SCNC: 141 MMOL/L (ref 136–145)
SPECIMEN VOL CSF: 5.5 ML
SPECIMEN VOL CSF: 6 ML
STATE: NORMAL
STREET ADDRESS: NORMAL
VENOUS/CAPILLARY: NORMAL
WBC # BLD AUTO: 6.85 K/UL (ref 3.9–12.7)
WBC # CSF: 1 /CU MM (ref 0–5)
WBC # CSF: 3 /CU MM (ref 0–5)
ZIP: NORMAL

## 2019-12-11 PROCEDURE — 87206 SMEAR FLUORESCENT/ACID STAI: CPT

## 2019-12-11 PROCEDURE — 87556 M.TUBERCULO DNA AMP PROBE: CPT

## 2019-12-11 PROCEDURE — 99000 SPECIMEN HANDLING OFFICE-LAB: CPT

## 2019-12-11 PROCEDURE — 88108 CYTOPATH CONCENTRATE TECH: CPT | Mod: 26,,, | Performed by: PATHOLOGY

## 2019-12-11 PROCEDURE — 84157 ASSAY OF PROTEIN OTHER: CPT

## 2019-12-11 PROCEDURE — 84166 PROTEIN E-PHORESIS/URINE/CSF: CPT | Mod: 26,,, | Performed by: PATHOLOGY

## 2019-12-11 PROCEDURE — 88108 PR  CYTOPATH FLUIDS,CONCENTRATN,INTERP: ICD-10-PCS | Mod: 26,,, | Performed by: PATHOLOGY

## 2019-12-11 PROCEDURE — 25000003 PHARM REV CODE 250: Performed by: STUDENT IN AN ORGANIZED HEALTH CARE EDUCATION/TRAINING PROGRAM

## 2019-12-11 PROCEDURE — 87070 CULTURE OTHR SPECIMN AEROBIC: CPT

## 2019-12-11 PROCEDURE — 25000003 PHARM REV CODE 250: Performed by: INTERNAL MEDICINE

## 2019-12-11 PROCEDURE — 99233 SBSQ HOSP IP/OBS HIGH 50: CPT | Mod: ,,, | Performed by: PSYCHIATRY & NEUROLOGY

## 2019-12-11 PROCEDURE — 84166 PROTEIN E-PHORESIS/URINE/CSF: CPT

## 2019-12-11 PROCEDURE — 99233 PR SUBSEQUENT HOSPITAL CARE,LEVL III: ICD-10-PCS | Mod: ,,, | Performed by: PSYCHIATRY & NEUROLOGY

## 2019-12-11 PROCEDURE — 36415 COLL VENOUS BLD VENIPUNCTURE: CPT

## 2019-12-11 PROCEDURE — 88108 CYTOPATH CONCENTRATE TECH: CPT | Performed by: PATHOLOGY

## 2019-12-11 PROCEDURE — 86651 ENCEPHALITIS CALIFORN ANTBDY: CPT | Mod: 91

## 2019-12-11 PROCEDURE — 92523 SPEECH SOUND LANG COMPREHEN: CPT

## 2019-12-11 PROCEDURE — 84156 ASSAY OF PROTEIN URINE: CPT

## 2019-12-11 PROCEDURE — 62270 PR SPINAL PUNCTURE,LUMBAR,DIAGNOSTIC: ICD-10-PCS | Mod: ,,, | Performed by: INTERNAL MEDICINE

## 2019-12-11 PROCEDURE — 86256 FLUORESCENT ANTIBODY TITER: CPT | Mod: 91

## 2019-12-11 PROCEDURE — 87798 DETECT AGENT NOS DNA AMP: CPT

## 2019-12-11 PROCEDURE — 86335 IMMUNFIX E-PHORSIS/URINE/CSF: CPT

## 2019-12-11 PROCEDURE — 80048 BASIC METABOLIC PNL TOTAL CA: CPT

## 2019-12-11 PROCEDURE — 82945 GLUCOSE OTHER FLUID: CPT

## 2019-12-11 PROCEDURE — 85025 COMPLETE CBC W/AUTO DIFF WBC: CPT

## 2019-12-11 PROCEDURE — 89051 BODY FLUID CELL COUNT: CPT

## 2019-12-11 PROCEDURE — 86335 IMMUNFIX E-PHORSIS/URINE/CSF: CPT | Mod: 26,,, | Performed by: PATHOLOGY

## 2019-12-11 PROCEDURE — 86618 LYME DISEASE ANTIBODY: CPT

## 2019-12-11 PROCEDURE — 25500020 PHARM REV CODE 255: Performed by: INTERNAL MEDICINE

## 2019-12-11 PROCEDURE — 86335 PATHOLOGIST INTERPRETATION UIFE: ICD-10-PCS | Mod: 26,,, | Performed by: PATHOLOGY

## 2019-12-11 PROCEDURE — 92526 ORAL FUNCTION THERAPY: CPT

## 2019-12-11 PROCEDURE — 82164 ANGIOTENSIN I ENZYME TEST: CPT

## 2019-12-11 PROCEDURE — 11000001 HC ACUTE MED/SURG PRIVATE ROOM

## 2019-12-11 PROCEDURE — 84166 PATHOLOGIST INTERPRETATION UPE: ICD-10-PCS | Mod: 26,,, | Performed by: PATHOLOGY

## 2019-12-11 PROCEDURE — 87205 SMEAR GRAM STAIN: CPT

## 2019-12-11 PROCEDURE — 82784 ASSAY IGA/IGD/IGG/IGM EACH: CPT

## 2019-12-11 PROCEDURE — 62270 DX LMBR SPI PNXR: CPT | Mod: ,,, | Performed by: INTERNAL MEDICINE

## 2019-12-11 PROCEDURE — 86592 SYPHILIS TEST NON-TREP QUAL: CPT

## 2019-12-11 PROCEDURE — A9585 GADOBUTROL INJECTION: HCPCS | Performed by: INTERNAL MEDICINE

## 2019-12-11 PROCEDURE — 87116 MYCOBACTERIA CULTURE: CPT

## 2019-12-11 RX ORDER — ACETAMINOPHEN 325 MG/1
650 TABLET ORAL ONCE
Status: COMPLETED | OUTPATIENT
Start: 2019-12-11 | End: 2019-12-11

## 2019-12-11 RX ORDER — CARBIDOPA AND LEVODOPA 25; 100 MG/1; MG/1
1 TABLET ORAL 3 TIMES DAILY
Qty: 90 TABLET | Refills: 11 | Status: SHIPPED | OUTPATIENT
Start: 2019-12-12 | End: 2020-08-03 | Stop reason: SDUPTHER

## 2019-12-11 RX ORDER — TRIHEXYPHENIDYL HYDROCHLORIDE 2 MG/5ML
5 SYRUP ORAL
Qty: 9000 ML | Refills: 0 | Status: SHIPPED | OUTPATIENT
Start: 2019-12-12 | End: 2019-12-12 | Stop reason: HOSPADM

## 2019-12-11 RX ORDER — TRIHEXYPHENIDYL HYDROCHLORIDE 2 MG/5ML
5 SYRUP ORAL
Status: DISCONTINUED | OUTPATIENT
Start: 2019-12-11 | End: 2019-12-12 | Stop reason: HOSPADM

## 2019-12-11 RX ADMIN — GABAPENTIN 300 MG: 300 CAPSULE ORAL at 09:12

## 2019-12-11 RX ADMIN — BUPROPION HYDROCHLORIDE 150 MG: 150 TABLET, FILM COATED, EXTENDED RELEASE ORAL at 09:12

## 2019-12-11 RX ADMIN — Medication 5 MG: at 04:12

## 2019-12-11 RX ADMIN — ACETAMINOPHEN 650 MG: 325 TABLET ORAL at 04:12

## 2019-12-11 RX ADMIN — FAMOTIDINE 20 MG: 20 TABLET ORAL at 09:12

## 2019-12-11 RX ADMIN — CARBIDOPA AND LEVODOPA 1 TABLET: 25; 100 TABLET ORAL at 08:12

## 2019-12-11 RX ADMIN — BACLOFEN 5 MG: 10 TABLET ORAL at 09:12

## 2019-12-11 RX ADMIN — CARBIDOPA AND LEVODOPA 1 TABLET: 25; 100 TABLET ORAL at 02:12

## 2019-12-11 RX ADMIN — BACLOFEN 5 MG: 10 TABLET ORAL at 08:12

## 2019-12-11 RX ADMIN — BACLOFEN 5 MG: 10 TABLET ORAL at 02:12

## 2019-12-11 RX ADMIN — FAMOTIDINE 20 MG: 20 TABLET ORAL at 08:12

## 2019-12-11 RX ADMIN — DICLOFENAC 2 G: 10 GEL TOPICAL at 09:12

## 2019-12-11 RX ADMIN — Medication 5 MG: at 10:12

## 2019-12-11 RX ADMIN — NAPROXEN 375 MG: 375 TABLET ORAL at 04:12

## 2019-12-11 RX ADMIN — CARBIDOPA AND LEVODOPA 1 TABLET: 25; 100 TABLET ORAL at 09:12

## 2019-12-11 RX ADMIN — NAPROXEN 375 MG: 375 TABLET ORAL at 09:12

## 2019-12-11 NOTE — PROCEDURES
Date of service  12/10/2019     Introduction  Electroencephalographic (EEG) recording is performed with electrodes placed according to the International 10-20 placement system. Thirty two (32) channels of digital signal (sampling rate of 512/sec) including T1 and T2 was simultaneously recorded from the scalp and may include EKG, EMG, and/or eye monitors. Recording band pass was 0.1 to 512 Hz. Digital video recording of the patient is simultaneously recorded with the EEG. The patient is instructed to report clinical symptoms which may occur during the recording session. EEG and video recording is stored and archived in digital format. Activation procedures which include photic stimulation, hyperventilation and instructing patients to perform simple tasks are done in selected patients.    The EEG is displayed on a monitor screen and can be reviewed using different montages. Computer assisted analysis is employed to detect spike and electrographic seizure activity. The entire record is submitted for computer analysis. The entire recording is visually reviewed and, the times identified by computer analysis as being spikes or seizures are reviewed again.     Compressed spectral analysis (CSA) is also performed on the activity recorded from each individual channel. This is displayed as a power display of frequencies from 0 to 30 Hz over time. The CSA is reviewed looking for asymmetries in power between homologous areas of the scalp and then compared with the original EEG recording.     Findings  The patient's background consists of a posteriorly dominant 9-1/2 Hz alpha rhythm, which is well formed, well modulated, and abolishes with eye opening.  Anteriorly, the patient's background consists of predominantly beta range frequencies.  There is no focal slowing.  There are no focal, lateralized, or epileptiform transients.  No electrographic seizures are seen.    During the course of the recording, the patient is noted to be  awake, and subsequently becomes drowsy.  Normal sleep architecture is not appreciated.    Hyperventilation and photic stimulation were not performed.     EKG demonstrates sinus rhythm.    Interpretation  This is a normal EEG in an awake and drowsy adult.  Please be aware that a normal EEG does not exclude the possibility of an underlying seizure disorder.

## 2019-12-11 NOTE — PLAN OF CARE
Patient AO x 4. Complains of pain 8/10 in his back. Tylenol 650mg given due to pt's BP being 100/59 at 0400 vitals. 24 hour urine procedure explained to patient and family. Urine sample initiated at 2100. Pt has yet to put out any urine during the night. Pt currently resting with all safety precautions in place and family at bedside. Will continue to monitor.

## 2019-12-11 NOTE — ASSESSMENT & PLAN NOTE
Spastic speech pattern, fluctuating  Continues to improve  Cognition is intact (evaluated by speech therapy)

## 2019-12-11 NOTE — ASSESSMENT & PLAN NOTE
Insidious-onset, progressive, to solids only    - Recommended SLP evaluation who recommended regular diet with thin liquids  - Recommend obtaining MBSS which showed Normal esophagus without evidence of dysmotility or stricture

## 2019-12-11 NOTE — SUBJECTIVE & OBJECTIVE
Subjective:     Brief HPI:  A 38 y/o M with no significant PMHx, p/w progressive spasticity in LLE (more noticeable x3 months), painful muscle spasms, generalized tremors, with progressive worsening, now involving motor component of speech and swallowing. He also reports intermittent diaphoresis, lightheadedness, blurred vision, and difficulty urinating. Family Hx positive for cancer, and patient has significant Hx of smoking and drinking. Unfortunately FIONA scan was not obtained due to high co-pay, MRI of Brain and C/L spine obtained in October without any finding that could justify the severity of symptoms. He was tried on Baclofen and Tizanidine with mild response (limited by drowsiness). He has responded moderately well to Artane and Ativan that were started recently.     Interval History:   12/11: mild improvement in tremor, pain and spasticity of L foot and speech. MRI of whole spine was obtained and unremarkable. Cho CT did not reveal any abnormalities suggestive of a malignant process. Labs unremarkable so far. LP pending.    12/10: The patient was started on sinemet last night. Artane was also increased to total of 10 mg daily (increase was not taken effect yet at the time of exam). Speech is improved this morning, unclear if it's in the setting of his usual fluctuations. Planning extensive for infectious, toxic and malignancy work up.    Current Neurological Medications: Baclofen, Bupropion, Gabapentin, Trihexyphenidyl, Carbidopa-levodopa    Current Facility-Administered Medications   Medication Dose Route Frequency Provider Last Rate Last Dose    acetaminophen suppository 325 mg  325 mg Rectal Q8H PRN Celso Chacon,         baclofen split tablet 5 mg  5 mg Oral TID Celso Chacon, DO   5 mg at 12/10/19 2033    buPROPion TB24 tablet 150 mg  150 mg Oral Daily Celso Chacon, DO   150 mg at 12/10/19 0931    carbidopa-levodopa  mg per tablet 1 tablet  1 tablet Oral TID Celso Chacon, DO   1 tablet  at 12/10/19 2033    dextrose 10% (D10W) Bolus  12.5 g Intravenous PRN Mariela Morgan MD        dextrose 10% (D10W) Bolus  25 g Intravenous PRN Mariela Morgan MD        diclofenac sodium 1 % gel 2 g  2 g Topical (Top) Daily Celso Chacon, DO        famotidine tablet 20 mg  20 mg Oral BID Celso Chacon, DO   20 mg at 12/10/19 2033    gabapentin capsule 300 mg  300 mg Oral Daily Celso Chacon, DO   300 mg at 12/10/19 0931    glucagon (human recombinant) injection 1 mg  1 mg Intramuscular PRN Celso Chacon, DO        glucose chewable tablet 16 g  16 g Oral PRN Celso Chacon, DO        glucose chewable tablet 24 g  24 g Oral PRN Celso Chacon, DO        LORazepam tablet 1 mg  1 mg Oral Q12H PRN Celso Chacon, DO        morphine injection 1 mg  1 mg Intravenous Q6H PRN Celso Chacon, DO   1 mg at 12/10/19 1705    naproxen tablet 375 mg  375 mg Oral BID WM Celso Chacon, DO        sodium chloride 0.9% flush 10 mL  10 mL Intravenous PRN Celso Parsonsay, DO        trihexyphenidyl elixir 5 mg  5 mg Oral BID AC Mariela Morgan MD           Review of Systems   Constitutional: Negative for chills and fever.   HENT: Positive for trouble swallowing. Negative for drooling.    Eyes: Negative for photophobia and visual disturbance.   Respiratory: Negative for choking and shortness of breath.    Cardiovascular: Negative for chest pain and palpitations.   Gastrointestinal: Negative for abdominal pain, nausea and vomiting.   Musculoskeletal: Positive for gait problem. Negative for myalgias, neck pain and neck stiffness.   Skin: Negative for color change and rash.   Allergic/Immunologic: Negative for immunocompromised state.   Neurological: Positive for tremors, speech difficulty (improved), weakness and numbness. Negative for dizziness, seizures and facial asymmetry.   Psychiatric/Behavioral: Negative for agitation, confusion, dysphoric mood and hallucinations.     Objective:     Vital Signs (Most Recent):  Temp: 96.8 °F  (36 °C) (12/11/19 0358)  Pulse: 85 (12/11/19 0358)  Resp: 16 (12/11/19 0358)  BP: (!) 100/59 (12/11/19 0358)  SpO2: 97 % (12/11/19 0358) Vital Signs (24h Range):  Temp:  [96.8 °F (36 °C)-97.7 °F (36.5 °C)] 96.8 °F (36 °C)  Pulse:  [71-90] 85  Resp:  [16-18] 16  SpO2:  [97 %-98 %] 97 %  BP: (100-103)/(59-64) 100/59     Weight: 95.3 kg (210 lb)  Body mass index is 28.48 kg/m².    Physical Exam   Constitutional: He is oriented to person, place, and time. He appears well-developed and well-nourished. He is cooperative.   HENT:   Head: Normocephalic and atraumatic.   Eyes: Pupils are equal, round, and reactive to light. EOM are normal.   Neck: Normal range of motion. Muscular tenderness present. No neck rigidity. Normal range of motion present.   Cardiovascular: Normal rate.   Pulmonary/Chest: Effort normal. No tachypnea. No respiratory distress.   Abdominal: Soft. Normal appearance. He exhibits no distension.   Musculoskeletal: He exhibits no edema.        Left foot: There is decreased range of motion and deformity.   Neurological: He is alert and oriented to person, place, and time. He displays no seizure activity.   Spastic speech (due to clenched jaws)  PERRL, EOM intact, no nystagmus.   Facial expression intact   Strength in glossal, masticatory muscles, and H&N muscles 5/5  No tongue or uvula deviation  L shoulder abduction 4/5 (painful ROM), LUE 4+/5 distally, normal tone  LLE 4/5 proximally, 4/5 dorsiflexion (improved), 4/5 plantar flexion, (dystonic plantar flexion on L foot is slightly improved)  RUE and RLE 5/5, normal tone  Bradykinesia and dysdiadochokinesia on BUE  Pill-rolling tremor on LUE has subsided  Involuntary movements of the L toes are less frequent  DTRs 2+ throughout   L striatal toe and R down going toes   Coordination has improved on BUE (L>R), slight endpoint tremor  deferred gait   Skin: Skin is warm. No rash noted. He is not diaphoretic.   Psychiatric: He has a normal mood and affect. His  behavior is normal. His speech is slurred. He is not actively hallucinating.       NEUROLOGICAL EXAMINATION:     MENTAL STATUS   Oriented to person, place, and time.   Speech: slurred     CRANIAL NERVES     CN III, IV, VI   Pupils are equal, round, and reactive to light.  Extraocular motions are normal.       Significant Labs:     CBC:   Recent Labs   Lab 12/09/19  1251 12/10/19  0720 12/11/19  0555   WBC 8.18 6.61  6.76 6.85   HGB 13.9* 13.1*  13.0* 13.7*   HCT 40.9 39.1*  39.9* 41.6    267  265 263     CMP:   Recent Labs   Lab 12/09/19  1251 12/10/19  0720   * 92    139   K 3.9 4.1    104   CO2 25 28   BUN 14 14   CREATININE 0.9 1.0   CALCIUM 9.6 9.0   PROT 7.6  --    ALBUMIN 4.1  --    BILITOT 0.5  --    ALKPHOS 81  --    AST 25  --    ALT 29  --    ANIONGAP 10 7*   EGFRNONAA >60.0 >60.0     Urine Studies:   Recent Labs   Lab 12/09/19  1425   COLORU Yellow   APPEARANCEUA Clear   PHUR 7.0   SPECGRAV 1.015   PROTEINUA Negative   GLUCUA Negative   KETONESU Negative   BILIRUBINUA Negative   OCCULTUA Negative   NITRITE Negative   LEUKOCYTESUR Negative       Lab Results   Component Value Date    CRP 2.4 12/10/2019    SEDRATE 7 12/10/2019       Lab Results   Component Value Date    TSH 0.744 12/10/2019    THYROPEROXID <6.0 12/10/2019       Lab Results   Component Value Date    DLL02ZNOR Negative 12/10/2019    RPR Non-reactive 12/10/2019     Heavy metal Screen negative    Pending labs:  Paraneoplastic panel in serum  Copper  ACE  GABRIEL  Lyme  SPEP/UPEP/DELILAH  LCFA  Vitamin E      Significant Imaging:   MRI C/T/L spine w/ w/o contrast (12/11/19):  No acute pathology signal change within the cord  Mild DJD in cervical (C6-7) and lumbar regions (L4-5)    CT C/A/P with contrast (12/10/19):  Multiple calcified nodules throughout the lungs, favoring old granulomatous disease. Biapical scarring.  Minimal centrilobular emphysematous changes with an upper lung zone predominance  Multiple colonic  diverticulae and no other acute pathology to suggest malignant process    MRI Brain w/ w/o contrast (12/9/19):  No acute intracranial abnormality  Unchanged from prior exam    MRI C spine w/o contrast (09/23/19):  1. Multilevel degenerative disc disease from C3-C5 resulting in moderate bilateral neural foraminal narrowing.  2. Degenerative disc disease at C5-C6 resulting in mild bilateral neural foraminal narrowing.  3. Degenerative disc disease at C6-C7 resulting in mild central spinal canal stenosis with severe narrowing of the right neural foramen and moderate narrowing the left neural foramen.     MRI L spine w/o contrast (09/23/19):  1. Sacralization of L5.  2. Grade 1 retrolisthesis of L4 on L5.  3. Degenerative disc disease at L4-L5 with a small central focal disc protrusion resulting in mild central spinal canal stenosis with mild bilateral neural foraminal narrowing.  4. Signal abnormality within the posterior central L4-L5 disc consistent with a small annular fissure.     MRI T spine w/o contrast (10/02/19):  Essentially normal MRI of the thoracic spine.     MRI Brain w/ w/o contrast (10/02/19):  No acute intracranial process. Normal appearing MRI of the Brain

## 2019-12-11 NOTE — PT/OT/SLP EVAL
Speech Language Pathology Evaluation  Cognitive Communication    Patient Name:  Storm Hinds   MRN:  [id number]  Admitting Diagnosis: Abnormal involuntary movements    Recommendations:     Recommendations:                General Recommendations:  Speech/language therapy  Diet recommendations:  Regular, Thin   Aspiration Precautions: Standard aspiration precautions   General Precautions: Standard, fall  Communication strategies:  none    History:     Past Medical History:   Diagnosis Date    Acid reflux     Depression     Muscle spasm     Pain     Tremors of nervous system        History reviewed. No pertinent surgical history.    Subjective     Patient wake;alert. Spouse present.     Pain/Comfort:  Pain Rating 1: 0/10    Objective:     Cognitive Status:    Arousal/Alertness Appropriate response to stimuli  Attention No obvious deficits observed .  Perseveration Not present  Orientation Oriented x4  Memory Immediate Recall WFL, Delayed recall of 3/3 unassociated words after 1 min filled delay.  and recall general information WFL  Problem Solving Categories patient provided 23 items within concrete category indep. , Sequencing of 4 items completed with 100% acc. , Solutions to hypothetical situations compelted with 100% acc.  and Compare/contrast completed with 100% acc.   Safety awareness good  Managing finances WFL  Simple calculation WFL                 Receptive Language:   Comprehension:   Questions Complex yes/no WFL  Commands  multistep basic commands WFL     Expressive Language:  Verbal:    Naming Confrontation WFL and Single word responsive naming WFL  Conversational speech WFL                Motor Speech:  Apraxia - patient talking with jaw clenched/though teeth intermittently throughout evaluation.     Voice:   WFL     Visual-Spatial:  TBA     Reading:   TBA      Written Expression:   TBA     Patient tolerated thin liquids via straw sips x4 along with regular solids x2 with no overt signs of airway  compromise.  Patient OK to continue current diet at this time.   Spouse reported that patient with much progress/improvement with cognitive status and speech today compared to previous service date.      Assessment:   Storm Hinds is a 39 y.o. male with an SLP diagnosis of Apraxia.  .    Goals:   Multidisciplinary Problems     SLP Goals        Problem: SLP Goal    Goal Priority Disciplines Outcome   SLP Goal     SLP Ongoing, Progressing   Description:  Goals to be met 12/17  1 pt will tolerate regular diet and thin liquids  2 pt will participate in speech lang eval    Additional goals:  3. Pt will complete OME's with min A.    4. Assess R/W/VS                    Plan:   · Patient to be seen:  2 x/week   · Plan of Care expires:     · Plan of Care reviewed with:  patient, spouse   · SLP Follow-Up:  Yes       Discharge recommendations:  Discharge Facility/Level of Care Needs: home with home health   Barriers to Discharge:  None    Time Tracking:   SLP Treatment Date:   12/11/19  Speech Start Time:  1130  Speech Stop Time:  1146     Speech Total Time (min):  16 min    Billable Minutes: Eval 8  and Treatment Swallowing Dysfunction 8    Emily Abadie, CCC-SLP  12/11/2019

## 2019-12-11 NOTE — ASSESSMENT & PLAN NOTE
Secondary to dystonic LLE  Ambulates with walker at home    - PT/OT to evaluate and treat -> recommending home with HH

## 2019-12-11 NOTE — ASSESSMENT & PLAN NOTE
Young otherwise healthy M with progressive spasticity in distal LLE and striatal L toe, painful muscle spasms (L>R), BUE tremors, BLE involuntary movements, spastic speech and esophageal dysphagia, as well as sensory changes, which did not localize to a specific location or category of neurological disorder. We are working up toxic (heavy metals,..) vs infectious (Lyme's, Tuberculosis, HIV,...) vs paraneoplastic vs hereditary (Jourdan's, Neuroacanthocytosis,..) phenomenon. No malignant focus was found so far, demyelinating process was excluded by MRI of spine, and epileptic phenomenon was ruled out by EEG. Patient has had moderate response to Sinemet.    - Please continue trihexyphenidyl to 5 mg BID + Sinemet 25 -100 mg TID for some symptomatic relief in the meanwhile that diagnostic work up is being carried on  - Plans for LP today (will be performed by primary team). Orders are in which include cell count, protein, glucose, gram stain, cultures, cytology, flow cytometry, AFB, VDRL, WNV, Lyme and arbovirus  - Will follow up on paraneoplastic panel and the other pending labs

## 2019-12-11 NOTE — PLAN OF CARE
Plan to d/c home with family vs home with family and home health.    Patient not medically ready for d/c. Post acute needs TBD.     CM will continue to follow up for d/c needs.    Meghann De Gumzan RN  [id number]

## 2019-12-11 NOTE — PROGRESS NOTES
[facility]  Neurology  Progress Note    Patient Name: Storm Hinds  MRN: [id number]  Admission Date: 12/9/2019  Hospital Length of Stay: 2 days  Code Status: Full Code   Attending Provider: Mariela Morgan MD  Primary Care Physician: Elliott Wasserman MD   Principal Problem:Abnormal involuntary movements      Subjective:     Brief HPI:  A 40 y/o M with no significant PMHx, p/w progressive spasticity in LLE (more noticeable x3 months), painful muscle spasms, generalized tremors, with progressive worsening, now involving motor component of speech and swallowing. He also reports intermittent diaphoresis, lightheadedness, blurred vision, and difficulty urinating. Family Hx positive for cancer, and patient has significant Hx of smoking and drinking. Unfortunately FIONA scan was not obtained due to high co-pay, MRI of Brain and C/L spine obtained in October without any finding that could justify the severity of symptoms. He was tried on Baclofen and Tizanidine with mild response (limited by drowsiness). He has responded moderately well to Artane and Ativan that were started recently.     Interval History:   12/11: mild improvement in tremor, pain and spasticity of L foot and speech. MRI of whole spine was obtained and unremarkable. Cho CT did not reveal any abnormalities suggestive of a malignant process. Labs unremarkable so far. LP pending.    12/10: The patient was started on sinemet last night. Artane was also increased to total of 10 mg daily (increase was not taken effect yet at the time of exam). Speech is improved this morning, unclear if it's in the setting of his usual fluctuations. Planning extensive for infectious, toxic and malignancy work up.    Current Neurological Medications: Baclofen, Bupropion, Gabapentin, Trihexyphenidyl, Carbidopa-levodopa    Current Facility-Administered Medications   Medication Dose Route Frequency Provider Last Rate Last Dose    acetaminophen suppository  325 mg  325 mg Rectal Q8H PRN Celso Parsonsay, DO        baclofen split tablet 5 mg  5 mg Oral TID Celso Chacon, DO   5 mg at 12/10/19 2033    buPROPion TB24 tablet 150 mg  150 mg Oral Daily Celso Chacon, DO   150 mg at 12/10/19 0931    carbidopa-levodopa  mg per tablet 1 tablet  1 tablet Oral TID Celso Chacon, DO   1 tablet at 12/10/19 2033    dextrose 10% (D10W) Bolus  12.5 g Intravenous PRN Mariela Morgan MD        dextrose 10% (D10W) Bolus  25 g Intravenous PRN Mariela Morgan MD        diclofenac sodium 1 % gel 2 g  2 g Topical (Top) Daily Celso Chacon, DO        famotidine tablet 20 mg  20 mg Oral BID Celso Chacon, DO   20 mg at 12/10/19 2033    gabapentin capsule 300 mg  300 mg Oral Daily Celso Chacon, DO   300 mg at 12/10/19 0931    glucagon (human recombinant) injection 1 mg  1 mg Intramuscular PRN Celso Chacon, DO        glucose chewable tablet 16 g  16 g Oral PRN Celso Chacon, DO        glucose chewable tablet 24 g  24 g Oral PRN Celso Chacon, DO        LORazepam tablet 1 mg  1 mg Oral Q12H PRN Celso Chacon, DO        morphine injection 1 mg  1 mg Intravenous Q6H PRN Celso Chacon, DO   1 mg at 12/10/19 1705    naproxen tablet 375 mg  375 mg Oral BID  Celso Chacon, DO        sodium chloride 0.9% flush 10 mL  10 mL Intravenous PRN Celso Chacon, DO        trihexyphenidyl elixir 5 mg  5 mg Oral BID AC Mariela Morgan MD           Review of Systems   Constitutional: Negative for chills and fever.   HENT: Positive for trouble swallowing. Negative for drooling.    Eyes: Negative for photophobia and visual disturbance.   Respiratory: Negative for choking and shortness of breath.    Cardiovascular: Negative for chest pain and palpitations.   Gastrointestinal: Negative for abdominal pain, nausea and vomiting.   Musculoskeletal: Positive for gait problem. Negative for myalgias, neck pain and neck stiffness.   Skin: Negative for color change and rash.   Allergic/Immunologic:  Negative for immunocompromised state.   Neurological: Positive for tremors, speech difficulty (improved), weakness and numbness. Negative for dizziness, seizures and facial asymmetry.   Psychiatric/Behavioral: Negative for agitation, confusion, dysphoric mood and hallucinations.     Objective:     Vital Signs (Most Recent):  Temp: 96.8 °F (36 °C) (12/11/19 0358)  Pulse: 85 (12/11/19 0358)  Resp: 16 (12/11/19 0358)  BP: (!) 100/59 (12/11/19 0358)  SpO2: 97 % (12/11/19 0358) Vital Signs (24h Range):  Temp:  [96.8 °F (36 °C)-97.7 °F (36.5 °C)] 96.8 °F (36 °C)  Pulse:  [71-90] 85  Resp:  [16-18] 16  SpO2:  [97 %-98 %] 97 %  BP: (100-103)/(59-64) 100/59     Weight: 95.3 kg (210 lb)  Body mass index is 28.48 kg/m².    Physical Exam   Constitutional: He is oriented to person, place, and time. He appears well-developed and well-nourished. He is cooperative.   HENT:   Head: Normocephalic and atraumatic.   Eyes: Pupils are equal, round, and reactive to light. EOM are normal.   Neck: Normal range of motion. Muscular tenderness present. No neck rigidity. Normal range of motion present.   Cardiovascular: Normal rate.   Pulmonary/Chest: Effort normal. No tachypnea. No respiratory distress.   Abdominal: Soft. Normal appearance. He exhibits no distension.   Musculoskeletal: He exhibits no edema.        Left foot: There is decreased range of motion and deformity.   Neurological: He is alert and oriented to person, place, and time. He displays no seizure activity.   Spastic speech (due to clenched jaws)  PERRL, EOM intact, no nystagmus.   Facial expression intact   Strength in glossal, masticatory muscles, and H&N muscles 5/5  No tongue or uvula deviation  L shoulder abduction 4/5 (painful ROM), LUE 4+/5 distally, normal tone  LLE 4/5 proximally, 4/5 dorsiflexion (improved), 4/5 plantar flexion, (dystonic plantar flexion on L foot is slightly improved)  RUE and RLE 5/5, normal tone  Bradykinesia and dysdiadochokinesia on  BUE  Pill-rolling tremor on LUE has subsided  Involuntary movements of the L toes are less frequent  DTRs 2+ throughout   L striatal toe and R down going toes   Coordination has improved on BUE (L>R), slight endpoint tremor  deferred gait   Skin: Skin is warm. No rash noted. He is not diaphoretic.   Psychiatric: He has a normal mood and affect. His behavior is normal. His speech is slurred. He is not actively hallucinating.       Significant Labs:     CBC:   Recent Labs   Lab 12/09/19  1251 12/10/19  0720 12/11/19  0555   WBC 8.18 6.61  6.76 6.85   HGB 13.9* 13.1*  13.0* 13.7*   HCT 40.9 39.1*  39.9* 41.6    267  265 263     CMP:   Recent Labs   Lab 12/09/19  1251 12/10/19  0720   * 92    139   K 3.9 4.1    104   CO2 25 28   BUN 14 14   CREATININE 0.9 1.0   CALCIUM 9.6 9.0   PROT 7.6  --    ALBUMIN 4.1  --    BILITOT 0.5  --    ALKPHOS 81  --    AST 25  --    ALT 29  --    ANIONGAP 10 7*   EGFRNONAA >60.0 >60.0     Urine Studies:   Recent Labs   Lab 12/09/19  1425   COLORU Yellow   APPEARANCEUA Clear   PHUR 7.0   SPECGRAV 1.015   PROTEINUA Negative   GLUCUA Negative   KETONESU Negative   BILIRUBINUA Negative   OCCULTUA Negative   NITRITE Negative   LEUKOCYTESUR Negative       Lab Results   Component Value Date    CRP 2.4 12/10/2019    SEDRATE 7 12/10/2019       Lab Results   Component Value Date    TSH 0.744 12/10/2019    THYROPEROXID <6.0 12/10/2019       Lab Results   Component Value Date    TFX49EILX Negative 12/10/2019    RPR Non-reactive 12/10/2019     Heavy metal Screen negative    Pending labs:  Paraneoplastic panel in serum  Copper  ACE  GABRIEL  Lyme  SPEP/UPEP/DELILAH  LCFA  Vitamin E      Significant Imaging:   MRI C/T/L spine w/ w/o contrast (12/11/19):  No acute pathology signal change within the cord  Mild DJD in cervical (C6-7) and lumbar regions (L4-5)    CT C/A/P with contrast (12/10/19):  Multiple calcified nodules throughout the lungs, favoring old granulomatous disease.  Biapical scarring.  Minimal centrilobular emphysematous changes with an upper lung zone predominance  Multiple colonic diverticulae and no other acute pathology to suggest malignant process    MRI Brain w/ w/o contrast (12/9/19):  No acute intracranial abnormality  Unchanged from prior exam    MRI C spine w/o contrast (09/23/19):  1. Multilevel degenerative disc disease from C3-C5 resulting in moderate bilateral neural foraminal narrowing.  2. Degenerative disc disease at C5-C6 resulting in mild bilateral neural foraminal narrowing.  3. Degenerative disc disease at C6-C7 resulting in mild central spinal canal stenosis with severe narrowing of the right neural foramen and moderate narrowing the left neural foramen.     MRI L spine w/o contrast (09/23/19):  1. Sacralization of L5.  2. Grade 1 retrolisthesis of L4 on L5.  3. Degenerative disc disease at L4-L5 with a small central focal disc protrusion resulting in mild central spinal canal stenosis with mild bilateral neural foraminal narrowing.  4. Signal abnormality within the posterior central L4-L5 disc consistent with a small annular fissure.     MRI T spine w/o contrast (10/02/19):  Essentially normal MRI of the thoracic spine.     MRI Brain w/ w/o contrast (10/02/19):  No acute intracranial process. Normal appearing MRI of the Brain    Assessment and Plan:     * Abnormal involuntary movements  Young otherwise healthy M with progressive spasticity in distal LLE and striatal L toe, painful muscle spasms (L>R), BUE tremors, BLE involuntary movements, spastic speech and esophageal dysphagia, as well as sensory changes, which did not localize to a specific location or category of neurological disorder. We are working up toxic (heavy metals,..) vs infectious (Lyme's, Tuberculosis, HIV,...) vs paraneoplastic vs hereditary (Jourdan's, Neuroacanthocytosis,..) phenomenon. No malignant focus was found so far, demyelinating process was excluded by MRI of spine, and epileptic  phenomenon was ruled out by EEG. Patient has had moderate response to Sinemet.    - Please continue trihexyphenidyl to 5 mg BID + Sinemet 25 -100 mg TID for some symptomatic relief in the meanwhile that diagnostic work up is being carried on  - Plans for LP today (will be performed by primary team). Orders are in which include cell count, protein, glucose, gram stain, cultures, cytology, flow cytometry, AFB, VDRL, WNV, Lyme and arbovirus  - Will follow up on paraneoplastic panel and the other pending labs    Esophageal dysphagia  Insidious-onset, progressive, to solids only    - Recommended SLP evaluation who recommended regular diet with thin liquids  - Recommend obtaining MBSS which showed Normal esophagus without evidence of dysmotility or stricture    Difficulty with speech  Spastic speech pattern, fluctuating  Continues to improve  Cognition is intact (evaluated by speech therapy)    Abnormality of gait and mobility  Secondary to dystonic LLE  Ambulates with walker at home    - PT/OT to evaluate and treat -> recommending home with         VTE Risk Mitigation (From admission, onward)         Ordered     Place sequential compression device  Until discontinued      12/09/19 0497                Candace Wright MD  Neurology  [facility]

## 2019-12-11 NOTE — PLAN OF CARE
Lumbar puncture performed at bedside per Dr. Morgan and Mary, pt tolerated well, nurse LUCA Gay RN  present @ bedside, able to make needs known, no distress noted, will continue to monitor.

## 2019-12-12 VITALS
RESPIRATION RATE: 18 BRPM | OXYGEN SATURATION: 96 % | HEART RATE: 86 BPM | TEMPERATURE: 98 F | DIASTOLIC BLOOD PRESSURE: 69 MMHG | SYSTOLIC BLOOD PRESSURE: 127 MMHG | BODY MASS INDEX: 28.44 KG/M2 | WEIGHT: 210 LBS | HEIGHT: 72 IN

## 2019-12-12 LAB
ACE SERPL-CCNC: 38 U/L (ref 16–85)
ANION GAP SERPL CALC-SCNC: 10 MMOL/L (ref 8–16)
B BURGDOR AB SER IA-ACNC: 0.12 INDEX VALUE
BASOPHILS # BLD AUTO: 0.04 K/UL (ref 0–0.2)
BASOPHILS NFR BLD: 0.5 % (ref 0–1.9)
BUN SERPL-MCNC: 19 MG/DL (ref 6–20)
CALCIUM SERPL-MCNC: 9.4 MG/DL (ref 8.7–10.5)
CHLORIDE SERPL-SCNC: 103 MMOL/L (ref 95–110)
CO2 SERPL-SCNC: 26 MMOL/L (ref 23–29)
COPPER SERPL-MCNC: 966 UG/L (ref 665–1480)
CREAT SERPL-MCNC: 1.1 MG/DL (ref 0.5–1.4)
DIFFERENTIAL METHOD: ABNORMAL
EOSINOPHIL # BLD AUTO: 0.4 K/UL (ref 0–0.5)
EOSINOPHIL NFR BLD: 4.7 % (ref 0–8)
ERYTHROCYTE [DISTWIDTH] IN BLOOD BY AUTOMATED COUNT: 12.8 % (ref 11.5–14.5)
EST. GFR  (AFRICAN AMERICAN): >60 ML/MIN/1.73 M^2
EST. GFR  (NON AFRICAN AMERICAN): >60 ML/MIN/1.73 M^2
GLUCOSE SERPL-MCNC: 102 MG/DL (ref 70–110)
HCT VFR BLD AUTO: 40.5 % (ref 40–54)
HGB BLD-MCNC: 13.6 G/DL (ref 14–18)
IMM GRANULOCYTES # BLD AUTO: 0.02 K/UL (ref 0–0.04)
IMM GRANULOCYTES NFR BLD AUTO: 0.3 % (ref 0–0.5)
INTERPRETATION UR IFE-IMP: NORMAL
LYMPHOCYTES # BLD AUTO: 2.8 K/UL (ref 1–4.8)
LYMPHOCYTES NFR BLD: 36.6 % (ref 18–48)
MCH RBC QN AUTO: 29.5 PG (ref 27–31)
MCHC RBC AUTO-ENTMCNC: 33.6 G/DL (ref 32–36)
MCV RBC AUTO: 88 FL (ref 82–98)
MONOCYTES # BLD AUTO: 0.6 K/UL (ref 0.3–1)
MONOCYTES NFR BLD: 8.1 % (ref 4–15)
NEUTROPHILS # BLD AUTO: 3.8 K/UL (ref 1.8–7.7)
NEUTROPHILS NFR BLD: 49.8 % (ref 38–73)
NRBC BLD-RTO: 0 /100 WBC
PATHOLOGIST INTERPRETATION IFE: NORMAL
PATHOLOGIST INTERPRETATION SPE: NORMAL
PLATELET # BLD AUTO: 271 K/UL (ref 150–350)
PMV BLD AUTO: 9.3 FL (ref 9.2–12.9)
POTASSIUM SERPL-SCNC: 3.9 MMOL/L (ref 3.5–5.1)
PROT 24H UR-MRATE: NORMAL MG/SPEC (ref 0–100)
PROT PATTERN UR ELPH-IMP: NORMAL
PROT UR-MCNC: <7 MG/DL (ref 0–15)
RBC # BLD AUTO: 4.61 M/UL (ref 4.6–6.2)
SODIUM SERPL-SCNC: 139 MMOL/L (ref 136–145)
URINE COLLECTION DURATION: 24 HR
URINE VOLUME: 1200 ML
WBC # BLD AUTO: 7.63 K/UL (ref 3.9–12.7)

## 2019-12-12 PROCEDURE — 97116 GAIT TRAINING THERAPY: CPT

## 2019-12-12 PROCEDURE — 25000003 PHARM REV CODE 250: Performed by: STUDENT IN AN ORGANIZED HEALTH CARE EDUCATION/TRAINING PROGRAM

## 2019-12-12 PROCEDURE — 36415 COLL VENOUS BLD VENIPUNCTURE: CPT

## 2019-12-12 PROCEDURE — 80048 BASIC METABOLIC PNL TOTAL CA: CPT

## 2019-12-12 PROCEDURE — 99239 HOSP IP/OBS DSCHRG MGMT >30: CPT | Mod: ,,, | Performed by: INTERNAL MEDICINE

## 2019-12-12 PROCEDURE — 99233 SBSQ HOSP IP/OBS HIGH 50: CPT | Mod: ,,, | Performed by: PSYCHIATRY & NEUROLOGY

## 2019-12-12 PROCEDURE — 25000003 PHARM REV CODE 250: Performed by: INTERNAL MEDICINE

## 2019-12-12 PROCEDURE — 97530 THERAPEUTIC ACTIVITIES: CPT

## 2019-12-12 PROCEDURE — 85025 COMPLETE CBC W/AUTO DIFF WBC: CPT

## 2019-12-12 PROCEDURE — 99239 PR HOSPITAL DISCHARGE DAY,>30 MIN: ICD-10-PCS | Mod: ,,, | Performed by: INTERNAL MEDICINE

## 2019-12-12 PROCEDURE — 99233 PR SUBSEQUENT HOSPITAL CARE,LEVL III: ICD-10-PCS | Mod: ,,, | Performed by: PSYCHIATRY & NEUROLOGY

## 2019-12-12 RX ORDER — TRIHEXYPHENIDYL HYDROCHLORIDE 5 MG/1
5 TABLET ORAL
Qty: 90 TABLET | Refills: 11 | Status: SHIPPED | OUTPATIENT
Start: 2019-12-12 | End: 2019-12-17 | Stop reason: SDUPTHER

## 2019-12-12 RX ORDER — TRIHEXYPHENIDYL HYDROCHLORIDE 5 MG/1
5 TABLET ORAL 2 TIMES DAILY WITH MEALS
Qty: 60 TABLET | Refills: 11 | Status: SHIPPED | OUTPATIENT
Start: 2019-12-12 | End: 2019-12-12 | Stop reason: HOSPADM

## 2019-12-12 RX ADMIN — BACLOFEN 5 MG: 10 TABLET ORAL at 03:12

## 2019-12-12 RX ADMIN — BACLOFEN 5 MG: 10 TABLET ORAL at 09:12

## 2019-12-12 RX ADMIN — CARBIDOPA AND LEVODOPA 1 TABLET: 25; 100 TABLET ORAL at 03:12

## 2019-12-12 RX ADMIN — FAMOTIDINE 20 MG: 20 TABLET ORAL at 09:12

## 2019-12-12 RX ADMIN — BUPROPION HYDROCHLORIDE 150 MG: 150 TABLET, FILM COATED, EXTENDED RELEASE ORAL at 09:12

## 2019-12-12 RX ADMIN — Medication 5 MG: at 09:12

## 2019-12-12 RX ADMIN — NAPROXEN 375 MG: 375 TABLET ORAL at 09:12

## 2019-12-12 RX ADMIN — DICLOFENAC 2 G: 10 GEL TOPICAL at 09:12

## 2019-12-12 RX ADMIN — CARBIDOPA AND LEVODOPA 1 TABLET: 25; 100 TABLET ORAL at 09:12

## 2019-12-12 RX ADMIN — GABAPENTIN 300 MG: 300 CAPSULE ORAL at 09:12

## 2019-12-12 NOTE — PROGRESS NOTES
[facility] Hospital Medicine  Progress Note    Patient Name: Storm Hinds  MRN: [id number]  Patient Class: IP- Inpatient   Admission Date: 12/9/2019  Length of Stay: 2 days  Attending Physician: Mariela Morgan MD  Primary Care Provider: Elliott Wasserman MD    [facility] Medicine Team: [facility] 3 Behram Khan, MD    Subjective:     Principal Problem:Abnormal involuntary movements        HPI:  The patient is a 39 year old male with past medical history MDD, reflux, smoking, recent LLE dystonia onset, presenting for worsening of recent symptoms and new speech difficulty. The patient's history is aided by significant other. The patient was his son's football game in September when he suddenly experienced pain and spasms in his left leg. The patient symptoms have been gradually worsening since then. Starting 4 days ago the patient started experiencing difficulty with speech when working with a rehab therapist. The problem waxes and wanes but overall worsening since then. In association he has difficulty with swallowing. Between squeezing out short clear audible phrases on questioning, the patient's face, jaw, and neck clenches. Sometimes the patient has word finding difficulty. The patient denies any loss of bowel or urinary control, but reports some decreased urine output possibly attributed to him by decreased po intake. The patient has seen multiple neurologists who have considered early onset Parkinson's and start ropinirole at one time without reported success. MRI of brain and spine in October unremarkable. Trihexylphenidyl was started with possible mild improvement of symptoms.     Patient in the past smoked up to 3 packs per day of tobacco quitting 10 years ago. He travels to [place] at times within the past year and has possibly seen tick on clothes but denies any rash or noting tic directly on skin. He does not maintain recent alcohol drinking. He denies any family history of  neurological or autoimmune disorders, but father had leukemia. Denies fever or chills.    Overview/Hospital Course:  The patient will be admitted for neurological workup of dystonia and expressive aphasia. MRI barbra ordered, ESR, CRP, paraneoplastic panel, SLP, resp mechanics, TSH, will be started on sinemet and trihexylphenidyl. 12/10 Patient tremors, bradykinesia, speech difficulty improved since sinemet started. Thyroid labs and ESR/CRP within normal limits. Respiratory mechanics NIF of -50 and VC of 1750ml. [place] of labs ordered to rule out metal or tick exposure, autoimmune causes, Pan imaging ordered.     Pt underwent successful lumbar puncture today in addition to MRI and CT as part of ongoing work up for his movement disorder.     Interval History: Pt with greatly improved aphasia and lower extremity contraction this AM.    Review of Systems   Constitutional: Negative for chills and fever.   HENT: Positive for trouble swallowing. Negative for drooling.    Eyes: Negative for photophobia and visual disturbance.   Respiratory: Negative for choking and shortness of breath.    Cardiovascular: Negative for chest pain and palpitations.   Gastrointestinal: Negative for abdominal pain, nausea and vomiting.   Musculoskeletal: Positive for gait problem. Negative for myalgias, neck pain and neck stiffness.   Skin: Negative for color change and rash.   Allergic/Immunologic: Negative for immunocompromised state.   Neurological: Positive for tremors, speech difficulty (improved), weakness and numbness. Negative for dizziness, seizures and facial asymmetry.   Psychiatric/Behavioral: Negative for agitation, confusion, dysphoric mood and hallucinations.     Objective:     Vital Signs (Most Recent):  Temp: 97.5 °F (36.4 °C) (12/11/19 1112)  Pulse: 92 (12/11/19 1603)  Resp: 17 (12/11/19 0900)  BP: (!) 112/59 (12/11/19 1112)  SpO2: (!) 94 % (12/11/19 1754) Vital Signs (24h Range):  Temp:  [96.8 °F (36 °C)-97.5 °F (36.4 °C)]  97.5 °F (36.4 °C)  Pulse:  [71-92] 92  Resp:  [16-17] 17  SpO2:  [94 %-98 %] 94 %  BP: (100-128)/(59-74) 112/59     Weight: 95.3 kg (210 lb)  Body mass index is 28.48 kg/m².    Intake/Output Summary (Last 24 hours) at 12/11/2019 1838  Last data filed at 12/10/2019 1900  Gross per 24 hour   Intake 250 ml   Output --   Net 250 ml      Physical Exam   Constitutional: He is oriented to person, place, and time. He appears well-developed and well-nourished. He appears distressed.   HENT:   Head: Normocephalic and atraumatic.   Eyes: EOM are normal. No scleral icterus.   Neck: Neck supple. No tracheal deviation present.   Cardiovascular: Normal rate, regular rhythm and normal heart sounds.   No murmur heard.  Pulmonary/Chest: Breath sounds normal. He has no wheezes. He has no rales.   Abdominal: Soft. Bowel sounds are normal. He exhibits no distension. There is no tenderness.   Musculoskeletal: He exhibits deformity. He exhibits no edema.   Neurological: He is alert and oriented to person, place, and time. He exhibits abnormal muscle tone. Coordination abnormal.   Expressive aphasia.  Dorsiflexion 3/5 and plantar flexion 4/5 on LLE, tremor noted bilateral LE, L>R, 1st digit dorsiflexed,   RUE  weakness, rigidity on UE    Skin: Skin is warm and dry.   Psychiatric: His behavior is normal. His mood appears anxious. His speech is slurred.       Significant Labs: All pertinent labs within the past 24 hours have been reviewed.    Significant Imaging: I have reviewed all pertinent imaging results/findings within the past 24 hours.      Assessment/Plan:      * Abnormal involuntary movements    The patient is a 39 year old male with past medical history of smoking, chronic low back pain, HLD, recent LLE dystonia, presenting for worsening expressive aphasia for 4 days. The patient had first onset of LLE dystonia in 9/2019, now progressing with spasticity, with acute onset of aphasia this past Thursday proma. The patient has seen  multiple neurologists, trying ropinirole without improvement, and has recently started trihexyphenidyl after 11/11 neurology appt. Patient possibly may have responded well to artane. Patient with smoking history, [place] travel with tick seen on clothes, father had leukemia. No neurological illness in family. He was not able to afford FIONA scan suggested by neurologist. Neurology consulted, differential including PD, PERM syndrome, partial SPS, paraneoplastic SPS.    PLAN  -neurology following, recs appreciated  -Sinemet  TID for sx relief  -Trihexylphenidyl 5mg bid sx relief  -ESR, CRP, TSH, Anti-TPO WNL  -await paraneoplastic panel w Anti-RADHA and Anti-amphiphysin Ab  - MRI of C/T/L spine w/wo contrast ordered; no evidence of demyelination  - RPR, HIV 1/2 ab, TB, Lyme Ab, GABRIEL, ACE, Heavy metal screen, Copper, Ceruloplasmin, SPEP/UPEP/DELILAH, Vitamin E, Acanthocytes in Peripheral smear, LVCFA ordered  - EEG to exclude epileptic component to the rhythmic repetitive movements of L foot ordered  - CT scan of C/A/P to r/o possible underlying malignancy ordered; negative  - Underwent LP today  -SLP consult, diet ordered, esophagram ordered per rec  -resp mechanics NIF of -50 and VC of 1750ml  -PT/OT for gait  -patient my be candidate for botox for LLE dystonia in future    Difficulty with speech        Esophageal dysphagia  See dystonia  Esophagram ordered    Abnormality of gait and mobility  See dystonia        VTE Risk Mitigation (From admission, onward)         Ordered     Place sequential compression device  Until discontinued      12/09/19 1755                      Behram Khan, MD  Department of Hospital Medicine   [facility]

## 2019-12-12 NOTE — PLAN OF CARE
12/12/19 1519   Post-Acute Status   Post-Acute Authorization Placement   Post-Acute Placement Status Set-up Complete     Patient will be discharging today with home health through [facility].     Sadie Last LMSW  [facility]   [id number]

## 2019-12-12 NOTE — PLAN OF CARE
2 goals met. Goals remain appropriate.     Problem: Physical Therapy Goal  Goal: Physical Therapy Goal  Description  Goals to be met by: 1/10/2020    Patient will increase functional independence with mobility by performin. Supine to sit with Modified [place] - met  2. Sit to supine with Modified [place] - met  3. Sit to stand transfer with Supervision  4. Gait  x 30 feet with Supervision using LRAD  5. Lower extremity exercise program x15 reps per handout, with independence    Outcome: Ongoing, Progressing

## 2019-12-12 NOTE — ASSESSMENT & PLAN NOTE
Secondary to dystonic LLE  Ambulates with walker at home  Foot drop has improved with Sinemet     - PT/OT to evaluate and treat -> recommending home with HH

## 2019-12-12 NOTE — SUBJECTIVE & OBJECTIVE
Subjective:     Brief HPI:  A 38 y/o M with no significant PMHx, p/w progressive spasticity in LLE (more noticeable x3 months), painful muscle spasms, generalized tremors, with progressive worsening, now involving motor component of speech and swallowing. He also reports intermittent diaphoresis, lightheadedness, blurred vision, and difficulty urinating. Family Hx positive for cancer, and patient has significant Hx of smoking and drinking. Unfortunately FIONA scan was not obtained due to high co-pay, MRI of Brain and C/L spine obtained in October without any finding that could justify the severity of symptoms. He was tried on Baclofen and Tizanidine with mild response (limited by drowsiness). He has responded moderately well to Artane and Ativan that were started recently.     Interval History:   12/12: involuntary movements in the L foot, dystonia, pain and speech have improved. CSF non infectious, non inflammatory.      12/11: mild improvement in tremor, pain and spasticity of L foot and speech. MRI of whole spine was obtained and unremarkable. Cho CT did not reveal any abnormalities suggestive of a malignant process. Labs unremarkable so far. LP pending.    12/10: The patient was started on sinemet last night. Artane was also increased to total of 10 mg daily (increase was not taken effect yet at the time of exam). Speech is improved this morning, unclear if it's in the setting of his usual fluctuations. Planning extensive for infectious, toxic and malignancy work up.    Current Neurological Medications: Baclofen, Bupropion, Gabapentin, Trihexyphenidyl, Carbidopa-levodopa    Current Facility-Administered Medications   Medication Dose Route Frequency Provider Last Rate Last Dose    acetaminophen suppository 325 mg  325 mg Rectal Q8H PRN Celso Chacon DO        baclofen split tablet 5 mg  5 mg Oral TID Celso Chacon DO   5 mg at 12/11/19 2029    buPROPion TB24 tablet 150 mg  150 mg Oral Daily Celso Chacon DO    150 mg at 12/11/19 0940    carbidopa-levodopa  mg per tablet 1 tablet  1 tablet Oral TID Celso Chacon, DO   1 tablet at 12/11/19 2029    dextrose 10% (D10W) Bolus  12.5 g Intravenous PRN Mariela Morgan MD        dextrose 10% (D10W) Bolus  25 g Intravenous PRN Mariela Morgan MD        diclofenac sodium 1 % gel 2 g  2 g Topical (Top) Daily Celso Chacon, DO   2 g at 12/11/19 0940    famotidine tablet 20 mg  20 mg Oral BID Celso Chacon, DO   20 mg at 12/11/19 2030    gabapentin capsule 300 mg  300 mg Oral Daily Celso Chacon, DO   300 mg at 12/11/19 0940    glucagon (human recombinant) injection 1 mg  1 mg Intramuscular PRN Celso Chacon, DO        glucose chewable tablet 16 g  16 g Oral PRN Celso Chacon, DO        glucose chewable tablet 24 g  24 g Oral PRN Celso Chacon, DO        LORazepam tablet 1 mg  1 mg Oral Q12H PRN Celso Parsonsay, DO        morphine injection 1 mg  1 mg Intravenous Q6H PRN Celso Chacon, DO   1 mg at 12/10/19 1705    naproxen tablet 375 mg  375 mg Oral BID WM Celso Chacon, DO   375 mg at 12/11/19 1655    sodium chloride 0.9% flush 10 mL  10 mL Intravenous PRN Celso Chacon, DO        trihexyphenidyl elixir 5 mg  5 mg Oral BID AC Mariela Morgan MD   5 mg at 12/11/19 1655       Review of Systems   Constitutional: Negative for chills and fever.   HENT: Negative for drooling and trouble swallowing.    Eyes: Negative for photophobia and visual disturbance.   Respiratory: Negative for choking and shortness of breath.    Cardiovascular: Negative for chest pain and palpitations.   Gastrointestinal: Negative for abdominal pain, nausea and vomiting.   Musculoskeletal: Positive for gait problem. Negative for myalgias, neck pain and neck stiffness.   Skin: Negative for color change and rash.   Allergic/Immunologic: Negative for immunocompromised state.   Neurological: Positive for tremors (improving), speech difficulty (improved notably) and weakness. Negative for dizziness,  seizures, facial asymmetry and numbness.   Psychiatric/Behavioral: Negative for agitation, confusion, dysphoric mood and hallucinations.     Objective:     Vital Signs (Most Recent):  Temp: 97.5 °F (36.4 °C) (12/12/19 0729)  Pulse: 71 (12/12/19 0729)  Resp: 16 (12/12/19 0729)  BP: 118/63 (12/12/19 0729)  SpO2: (!) 94 % (12/12/19 0729) Vital Signs (24h Range):  Temp:  [96.2 °F (35.7 °C)-97.7 °F (36.5 °C)] 97.5 °F (36.4 °C)  Pulse:  [61-92] 71  Resp:  [16-20] 16  SpO2:  [94 %-96 %] 94 %  BP: (112-128)/(59-74) 118/63     Weight: 95.3 kg (210 lb)  Body mass index is 28.48 kg/m².    Physical Exam   Constitutional: He is oriented to person, place, and time. He appears well-developed and well-nourished. He is cooperative.   HENT:   Head: Normocephalic and atraumatic.   Eyes: Pupils are equal, round, and reactive to light. EOM are normal.   Neck: Normal range of motion. No muscular tenderness present. No neck rigidity. Normal range of motion present.   Cardiovascular: Normal rate.   Pulmonary/Chest: Effort normal. No tachypnea. No respiratory distress.   Abdominal: Soft. Normal appearance. He exhibits no distension.   Musculoskeletal: He exhibits no edema.        Left foot: There is decreased range of motion and deformity.   Neurological: He is alert and oriented to person, place, and time. He displays no seizure activity.   Speech has improved (jaws not clenched anymore)  Cranial nerves intact  LLE 5/5 proximally, 4/5 dorsiflexion (improved), 5/5 plantar flexion, (dystonic plantar flexion on L foot is moderately improved)  BUE and RLE 5/5, normal tone  DTRs 2+ throughout   L striatal toe and R down going toes   Bradykinesia and dysdiadochokinesia have significantly improved  Involuntary movements of the L toes are less frequent  No Pill-rolling tremor on LUE, very slight low amplitude, moderate frequency tremor on L hand fingers + slight endpoint tremor     Skin: Skin is warm. No rash noted. He is not diaphoretic.    Psychiatric: He has a normal mood and affect. His behavior is normal. His speech is not slurred. He is not actively hallucinating.       NEUROLOGICAL EXAMINATION:     MENTAL STATUS   Oriented to person, place, and time.   Speech: not slurred     CRANIAL NERVES     CN III, IV, VI   Pupils are equal, round, and reactive to light.  Extraocular motions are normal.       Significant Labs:     CBC:   Recent Labs   Lab 12/11/19  0555 12/12/19  0457   WBC 6.85 7.63   HGB 13.7* 13.6*   HCT 41.6 40.5    271     CMP:   Recent Labs   Lab 12/11/19  0555 12/12/19  0457   GLU 92 102    139   K 4.2 3.9    103   CO2 21* 26   BUN 16 19   CREATININE 1.1 1.1   CALCIUM 9.4 9.4   ANIONGAP 13 10   EGFRNONAA >60.0 >60.0     Urine Studies:       Recent Labs   Lab 12/09/19  1425   COLORU Yellow   APPEARANCEUA Clear   PHUR 7.0   SPECGRAV 1.015   PROTEINUA Negative   GLUCUA Negative   KETONESU Negative   BILIRUBINUA Negative   OCCULTUA Negative   NITRITE Negative   LEUKOCYTESUR Negative        Lab Results   Component Value Date    CRP 2.4 12/10/2019    SEDRATE 7 12/10/2019       Lab Results   Component Value Date    TSH 0.744 12/10/2019    THYROPEROXID <6.0 12/10/2019       Lab Results   Component Value Date    QSH56PLPW Negative 12/10/2019    RPR Non-reactive 12/10/2019    LYMEAB 0.12 12/10/2019     Lab Results   Component Value Date    CERULOPLSM 32.0 12/10/2019     Lab Results   Component Value Date    ANASCREEN Negative <1:160 12/10/2019     Heavy metal Screen negative    Pending labs:  Paraneoplastic panel in serum  Copper  Serum ACE  Lyme  SPEP/UPEP/DELILAH  LCFA  Vitamin E  CSF WNV, Arbovirus, Lyme, VDRL, ACE, TB  CSF cytology and flowcytometry  CSF Paraneoplastic panel and MS panel      Significant Imaging:   FL Esophagram (12/11/19):  Normal esophagus without evidence of dysmotility or stricture    MRI C/T/L spine w/ w/o contrast (12/11/19):  No acute pathology signal change within the cord  Mild DJD in cervical  (C6-7) and lumbar regions (L4-5)    CT C/A/P with contrast (12/10/19):  Multiple calcified nodules throughout the lungs, favoring old granulomatous disease. Biapical scarring.  Minimal centrilobular emphysematous changes with an upper lung zone predominance  Multiple colonic diverticulae and no other acute pathology to suggest malignant process    MRI Brain w/ w/o contrast (12/9/19):  No acute intracranial abnormality  Unchanged from prior exam    MRI C spine w/o contrast (09/23/19):  1. Multilevel degenerative disc disease from C3-C5 resulting in moderate bilateral neural foraminal narrowing.  2. Degenerative disc disease at C5-C6 resulting in mild bilateral neural foraminal narrowing.  3. Degenerative disc disease at C6-C7 resulting in mild central spinal canal stenosis with severe narrowing of the right neural foramen and moderate narrowing the left neural foramen.     MRI L spine w/o contrast (09/23/19):  1. Sacralization of L5.  2. Grade 1 retrolisthesis of L4 on L5.  3. Degenerative disc disease at L4-L5 with a small central focal disc protrusion resulting in mild central spinal canal stenosis with mild bilateral neural foraminal narrowing.  4. Signal abnormality within the posterior central L4-L5 disc consistent with a small annular fissure.     MRI T spine w/o contrast (10/02/19):  Essentially normal MRI of the thoracic spine.     MRI Brain w/ w/o contrast (10/02/19):  No acute intracranial process. Normal appearing MRI of the Brain

## 2019-12-12 NOTE — PLAN OF CARE
Pt dc 'd as ordered, IV dc 'd catheter intact, pt verbalizes instructions and Rx's, pt escorted out via wheelchair per transport, telemetry leads dc 'd, no distress noted.

## 2019-12-12 NOTE — PLAN OF CARE
12/12/19 1439   Final Note   Assessment Type Final Discharge Note   Anticipated Discharge Disposition Home-Health   Hospital Follow Up  Appt(s) scheduled? Yes   Discharge plans and expectations educations in teach back method with documentation complete? Yes   Right Care Referral Info   Post Acute Recommendation Home-care   Facility Name [facility] [place], [place]

## 2019-12-12 NOTE — ASSESSMENT & PLAN NOTE
Insidious-onset, progressive, to solids only  Symptoms have improved throughout hospital stay    - Recommended SLP evaluation who recommended regular diet with thin liquids  - Recommend obtaining MBSS which showed Normal esophagus without evidence of dysmotility or stricture

## 2019-12-12 NOTE — ASSESSMENT & PLAN NOTE
Young otherwise healthy M with progressive spasticity in distal LLE and L striatal toe, painful muscle spasms (L>R), BUE tremors, BLE involuntary movements, spastic speech and esophageal dysphagia, as well as sensory changes, which did not localize to a specific location or category of neurological disorder. So far inconclusive work up for toxic, infectious (normal CSF), paraneoplastic (negative pan CT scan), inflammatory (normal CSF protein, normal spine MRI), and hereditary phenomenon. Patient has responded well to symptomatic treatment of dystonia and involuntary movements.    - we will follow up on the pending lab results as outpatient -> scheduled with Dr. Cunningham and Dr. Russell on January 27th.  - Discharge plan: Trihexyphenidyl to 5 mg BID + Sinemet 25 -100 mg TID

## 2019-12-12 NOTE — PROCEDURES
"Storm Hinds is a 39 y.o. male patient.    Temp: 97.7 °F (36.5 °C) (12/11/19 2025)  Pulse: 79 (12/11/19 2025)  Resp: 18 (12/11/19 2025)  BP: 119/65 (12/11/19 2025)  SpO2: 95 % (12/11/19 2025)  Weight: 95.3 kg (210 lb) (12/09/19 1204)  Height: 6' (182.9 cm) (12/09/19 1204)       Lumbar Puncture  Date/Time: 12/11/2019 9:28 PM  Location procedure was performed: [facility] MEDICINE  Performed by: Behram Khan, MD  Authorized by: Behram Khan, MD   Assisting provider: Mariela Morgan MD  Pre-operative diagnosis:  Undifferentiated movement disorder  Post-operative diagnosis: Undifferentiated movement disorder  Consent Done: Yes  Site marked: the operative site was marked  Time out: Immediately prior to procedure a "time out" was called to verify the correct patient, procedure, equipment, support staff and site/side marked as required.  Indications: diagnostic evaluation  Anesthesia: local infiltration    Anesthesia:  Local anesthesia used: yes  Local Anesthetic: lidocaine 1% with epinephrine  Anesthetic total: 10 mL  Patient sedated: no  Description of findings: Clear fluid   Preparation: Patient was prepped and draped in the usual sterile fashion.  Lumbar space: L3-L4 interspace  Patient's position: right lateral decubitus  Needle gauge: 18  Needle type: dev point  Needle length: 3.5 in  Number of attempts: 2  Opening pressure: 2 cm H2O  Fluid appearance: clear  Tubes of fluid: 4  Total volume: 20 ml  Post-procedure: site cleaned  Estimated blood loss (mL): 1  Specimens: Yes  Patient tolerance: Patient tolerated the procedure well with no immediate complications  Comments: Pt was placed in lateral decubitus position. The L3-L4 space was located midline between the superior aspect of iliac crests. Site was marked and pt was draped in sterile fashion. Local anesthetic was injected. Spinal needle was advanced to the epidural space. Manometry was attached. Pt's opening pressure was 2 cm H2O. Four samples were " taken and sent for studies.             Behram Khan  12/11/2019

## 2019-12-12 NOTE — SUBJECTIVE & OBJECTIVE
Interval History: Pt with greatly improved aphasia and lower extremity contraction this AM.    Review of Systems   Constitutional: Negative for chills and fever.   HENT: Positive for trouble swallowing. Negative for drooling.    Eyes: Negative for photophobia and visual disturbance.   Respiratory: Negative for choking and shortness of breath.    Cardiovascular: Negative for chest pain and palpitations.   Gastrointestinal: Negative for abdominal pain, nausea and vomiting.   Musculoskeletal: Positive for gait problem. Negative for myalgias, neck pain and neck stiffness.   Skin: Negative for color change and rash.   Allergic/Immunologic: Negative for immunocompromised state.   Neurological: Positive for tremors, speech difficulty (improved), weakness and numbness. Negative for dizziness, seizures and facial asymmetry.   Psychiatric/Behavioral: Negative for agitation, confusion, dysphoric mood and hallucinations.     Objective:     Vital Signs (Most Recent):  Temp: 97.5 °F (36.4 °C) (12/11/19 1112)  Pulse: 92 (12/11/19 1603)  Resp: 17 (12/11/19 0900)  BP: (!) 112/59 (12/11/19 1112)  SpO2: (!) 94 % (12/11/19 1754) Vital Signs (24h Range):  Temp:  [96.8 °F (36 °C)-97.5 °F (36.4 °C)] 97.5 °F (36.4 °C)  Pulse:  [71-92] 92  Resp:  [16-17] 17  SpO2:  [94 %-98 %] 94 %  BP: (100-128)/(59-74) 112/59     Weight: 95.3 kg (210 lb)  Body mass index is 28.48 kg/m².    Intake/Output Summary (Last 24 hours) at 12/11/2019 1838  Last data filed at 12/10/2019 1900  Gross per 24 hour   Intake 250 ml   Output --   Net 250 ml      Physical Exam   Constitutional: He is oriented to person, place, and time. He appears well-developed and well-nourished. He appears distressed.   HENT:   Head: Normocephalic and atraumatic.   Eyes: EOM are normal. No scleral icterus.   Neck: Neck supple. No tracheal deviation present.   Cardiovascular: Normal rate, regular rhythm and normal heart sounds.   No murmur heard.  Pulmonary/Chest: Breath sounds normal. He  has no wheezes. He has no rales.   Abdominal: Soft. Bowel sounds are normal. He exhibits no distension. There is no tenderness.   Musculoskeletal: He exhibits deformity. He exhibits no edema.   Neurological: He is alert and oriented to person, place, and time. He exhibits abnormal muscle tone. Coordination abnormal.   Expressive aphasia.  Dorsiflexion 3/5 and plantar flexion 4/5 on LLE, tremor noted bilateral LE, L>R, 1st digit dorsiflexed,   RUE  weakness, rigidity on UE    Skin: Skin is warm and dry.   Psychiatric: His behavior is normal. His mood appears anxious. His speech is slurred.       Significant Labs: All pertinent labs within the past 24 hours have been reviewed.    Significant Imaging: I have reviewed all pertinent imaging results/findings within the past 24 hours.

## 2019-12-12 NOTE — ASSESSMENT & PLAN NOTE
The patient is a 39 year old male with past medical history of smoking, chronic low back pain, HLD, recent LLE dystonia, presenting for worsening expressive aphasia for 4 days. The patient had first onset of LLE dystonia in 9/2019, now progressing with spasticity, with acute onset of aphasia this past Thursday proma. The patient has seen multiple neurologists, trying ropinirole without improvement, and has recently started trihexyphenidyl after 11/11 neurology appt. Patient possibly may have responded well to artane. Patient with smoking history, [place] travel with tick seen on clothes, father had leukemia. No neurological illness in family. He was not able to afford FIONA scan suggested by neurologist. Neurology consulted, differential including PD, PERM syndrome, partial SPS, paraneoplastic SPS.    PLAN  -neurology following, recs appreciated  -Sinemet  TID for sx relief  -Trihexylphenidyl 5mg bid sx relief  -ESR, CRP, TSH, Anti-TPO WNL  -await paraneoplastic panel w Anti-RADHA and Anti-amphiphysin Ab  - MRI of C/T/L spine w/wo contrast ordered; no evidence of demyelination  - RPR, HIV 1/2 ab, TB, Lyme Ab, GABRIEL, ACE, Heavy metal screen, Copper, Ceruloplasmin, SPEP/UPEP/DELILAH, Vitamin E, Acanthocytes in Peripheral smear, LVCFA ordered  - EEG to exclude epileptic component to the rhythmic repetitive movements of L foot ordered  - CT scan of C/A/P to r/o possible underlying malignancy ordered; negative  - Underwent LP today  -SLP consult, diet ordered, esophagram ordered per rec  -resp mechanics NIF of -50 and VC of 1750ml  -PT/OT for gait  -patient my be candidate for botox for LLE dystonia in future

## 2019-12-12 NOTE — PLAN OF CARE
[facility]    HOME HEALTH ORDERS  FACE TO FACE ENCOUNTER    Patient Name: Storm Hinds  YOB: 1980    PCP: Elliott Wasserman MD   PCP Address: [facility] [address]  PCP Phone Number: [phone]  PCP Fax: [phone]    Encounter Date: 12/11/2019    Admit to Home Health    Diagnoses:  Active Hospital Problems    Diagnosis  POA    *Dystonia [G24.9]  Yes     Onset of dystonic posture in LLE 09/16/19.      Chest pain [R07.9]  Yes    Oropharyngeal dysphagia [R13.12]  Yes    Abnormal involuntary movements [R25.9]  Yes    Encounter for lumbar puncture [Z01.89]  Not Applicable    Difficulty with speech [R47.9]  Yes    Dysphagia [R13.10]  Yes    Abnormality of gait and mobility [R26.9]  Yes      Resolved Hospital Problems   No resolved problems to display.       Future Appointments   Date Time Provider Department Center   12/12/2019  4:20 PM [facility]     Follow-up Information     Elliott Wasserman MD.    Specialty:  Family Medicine  Contact information:  [facility] [address]  [phone]             [facility] - Neurology.    Specialty:  Neurology  Contact information:  [address] [facility]  [facility] [address] [phone]  Additional information:  7th [facility]                   I have seen and examined this patient face to face today. My clinical findings that support the need for the home health skilled services and home bound status are the following:  Weakness/numbness causing balance and gait disturbance due to Weakness/Debility making it taxing to leave home.  Requiring assistive device to leave home due to unsteady gait caused by  Weakness/Debility.    Allergies:Review of patient's allergies indicates:  No Known Allergies    Diet: regular diet    Activities: activity as tolerated    Nursing:   SN to complete comprehensive assessment including routine  vital signs. Instruct on disease process and s/s of complications to report to MD. Review/verify medication list sent home with the patient at time of discharge  and instruct patient/caregiver as needed. Frequency may be adjusted depending on start of care date.    Notify MD if SBP > 160 or < 90; DBP > 90 or < 50; HR > 120 or < 50; Temp > 101; Other:         CONSULTS:    Physical Therapy to evaluate and treat. Evaluate for home safety and equipment needs; Establish/upgrade home exercise program. Perform / instruct on therapeutic exercises, gait training, transfer training, and Range of Motion.  Occupational Therapy to evaluate and treat. Evaluate home environment for safety and equipment needs. Perform/Instruct on transfers, ADL training, ROM, and therapeutic exercises.  Speech Therapy  to evaluate and treat for  Language, Swallowing and Cognition.    MISCELLANEOUS CARE:      WOUND CARE ORDERS        Medications: Review discharge medications with patient and family and provide education.      Current Discharge Medication List      START taking these medications    Details   carbidopa-levodopa  mg (SINEMET)  mg per tablet Take 1 tablet by mouth 3 (three) times daily.  Qty: 90 tablet, Refills: 11      trihexyphenidyl (ARTANE) 0.4 mg/mL Elix Take 12.5 mLs (5 mg total) by mouth 2 (two) times daily before meals.  Qty: 9000 mL, Refills: 0         CONTINUE these medications which have NOT CHANGED    Details   baclofen (LIORESAL) 5 mg Tab tablet Take 1 tablet (5 mg total) by mouth 3 (three) times daily.  Qty: 90 tablet, Refills: 0    Associated Diagnoses: Muscle spasm      buPROPion (WELLBUTRIN XL) 150 MG TB24 tablet Take 1 tablet (150 mg total) by mouth once daily.  Qty: 30 tablet, Refills: 1    Associated Diagnoses: Situational depression      gabapentin (NEURONTIN) 300 MG capsule TAKE 1 CAPSULE BY MOUTH ONCE DAILY IN THE EVENING  Qty: 30 capsule, Refills: 0    Comments: Please consider 90 day supplies to  promote better adherence  Associated Diagnoses: Left arm numbness; Right leg numbness      LORazepam (ATIVAN) 1 MG tablet Take 1 tablet (1 mg total) by mouth every 12 (twelve) hours as needed for Anxiety.  Qty: 30 tablet, Refills: 0    Associated Diagnoses: Muscle weakness (generalized)      ranitidine (ZANTAC) 300 MG tablet Take 1 tablet (300 mg total) by mouth every evening.  Qty: 30 tablet, Refills: 11    Associated Diagnoses: Gastroesophageal reflux disease, esophagitis presence not specified      diclofenac sodium (VOLTAREN) 1 % Gel Apply 2 g topically 3 (three) times daily as needed.  Qty: 100 g, Refills: 1    Associated Diagnoses: Arthralgia of multiple joints         STOP taking these medications       trihexyphenidyl (ARTANE) 2 MG tablet Comments:   Reason for Stopping:               I certify that this patient is confined to his home and needs physical therapy, speech therapy and occupational therapy.

## 2019-12-12 NOTE — PROGRESS NOTES
[facility]  Neurology  Progress Note    Patient Name: Storm Hinds  MRN: [id number]  Admission Date: 12/9/2019  Hospital Length of Stay: 3 days  Code Status: Full Code   Attending Provider: Mariela Morgan MD  Primary Care Physician: Elliott Wasserman MD   Principal Problem:Dystonia      Subjective:     Brief HPI:  A 40 y/o M with no significant PMHx, p/w progressive spasticity in LLE (more noticeable x3 months), painful muscle spasms, generalized tremors, with progressive worsening, now involving motor component of speech and swallowing. He also reports intermittent diaphoresis, lightheadedness, blurred vision, and difficulty urinating. Family Hx positive for cancer, and patient has significant Hx of smoking and drinking. Unfortunately FIONA scan was not obtained due to high co-pay, MRI of Brain and C/L spine obtained in October without any finding that could justify the severity of symptoms. He was tried on Baclofen and Tizanidine with mild response (limited by drowsiness). He has responded moderately well to Artane and Ativan that were started recently.     Interval History:   12/12: involuntary movements in the L foot, dystonia, pain and speech have improved. CSF non infectious, non inflammatory.      12/11: mild improvement in tremor, pain and spasticity of L foot and speech. MRI of whole spine was obtained and unremarkable. Cho CT did not reveal any abnormalities suggestive of a malignant process. Labs unremarkable so far. LP pending.    12/10: The patient was started on sinemet last night. Artane was also increased to total of 10 mg daily (increase was not taken effect yet at the time of exam). Speech is improved this morning, unclear if it's in the setting of his usual fluctuations. Planning extensive for infectious, toxic and malignancy work up.    Current Neurological Medications: Baclofen, Bupropion, Gabapentin, Trihexyphenidyl, Carbidopa-levodopa    Current Facility-Administered  Medications   Medication Dose Route Frequency Provider Last Rate Last Dose    acetaminophen suppository 325 mg  325 mg Rectal Q8H PRN Celso Parsonsay, DO        baclofen split tablet 5 mg  5 mg Oral TID Celso Chacon, DO   5 mg at 12/11/19 2029    buPROPion TB24 tablet 150 mg  150 mg Oral Daily Celso Chacon, DO   150 mg at 12/11/19 0940    carbidopa-levodopa  mg per tablet 1 tablet  1 tablet Oral TID Celso Chacon, DO   1 tablet at 12/11/19 2029    dextrose 10% (D10W) Bolus  12.5 g Intravenous PRN Mariela Morgan MD        dextrose 10% (D10W) Bolus  25 g Intravenous PRN Mariela Morgan MD        diclofenac sodium 1 % gel 2 g  2 g Topical (Top) Daily Celso Chacon, DO   2 g at 12/11/19 0940    famotidine tablet 20 mg  20 mg Oral BID Celso Chacon, DO   20 mg at 12/11/19 2030    gabapentin capsule 300 mg  300 mg Oral Daily Celso Chacon, DO   300 mg at 12/11/19 0940    glucagon (human recombinant) injection 1 mg  1 mg Intramuscular PRN Celso Chacon, DO        glucose chewable tablet 16 g  16 g Oral PRN Celso Chacon, DO        glucose chewable tablet 24 g  24 g Oral PRN Celso Chacon, DO        LORazepam tablet 1 mg  1 mg Oral Q12H PRN Celso Chacon, DO        morphine injection 1 mg  1 mg Intravenous Q6H PRN Celso Chacon, DO   1 mg at 12/10/19 1705    naproxen tablet 375 mg  375 mg Oral BID WM Celso Chacon, DO   375 mg at 12/11/19 1655    sodium chloride 0.9% flush 10 mL  10 mL Intravenous PRN Celso Chacon, DO        trihexyphenidyl elixir 5 mg  5 mg Oral BID AC Mariela Morgan MD   5 mg at 12/11/19 1655       Review of Systems   Constitutional: Negative for chills and fever.   HENT: Negative for drooling and trouble swallowing.    Eyes: Negative for photophobia and visual disturbance.   Respiratory: Negative for choking and shortness of breath.    Cardiovascular: Negative for chest pain and palpitations.   Gastrointestinal: Negative for abdominal pain, nausea and vomiting.    Musculoskeletal: Positive for gait problem. Negative for myalgias, neck pain and neck stiffness.   Skin: Negative for color change and rash.   Allergic/Immunologic: Negative for immunocompromised state.   Neurological: Positive for tremors (improving), speech difficulty (improved notably) and weakness. Negative for dizziness, seizures, facial asymmetry and numbness.   Psychiatric/Behavioral: Negative for agitation, confusion, dysphoric mood and hallucinations.     Objective:     Vital Signs (Most Recent):  Temp: 97.5 °F (36.4 °C) (12/12/19 0729)  Pulse: 71 (12/12/19 0729)  Resp: 16 (12/12/19 0729)  BP: 118/63 (12/12/19 0729)  SpO2: (!) 94 % (12/12/19 0729) Vital Signs (24h Range):  Temp:  [96.2 °F (35.7 °C)-97.7 °F (36.5 °C)] 97.5 °F (36.4 °C)  Pulse:  [61-92] 71  Resp:  [16-20] 16  SpO2:  [94 %-96 %] 94 %  BP: (112-128)/(59-74) 118/63     Weight: 95.3 kg (210 lb)  Body mass index is 28.48 kg/m².    Physical Exam   Constitutional: He is oriented to person, place, and time. He appears well-developed and well-nourished. He is cooperative.   HENT:   Head: Normocephalic and atraumatic.   Eyes: Pupils are equal, round, and reactive to light. EOM are normal.   Neck: Normal range of motion. No muscular tenderness present. No neck rigidity. Normal range of motion present.   Cardiovascular: Normal rate.   Pulmonary/Chest: Effort normal. No tachypnea. No respiratory distress.   Abdominal: Soft. Normal appearance. He exhibits no distension.   Musculoskeletal: He exhibits no edema.        Left foot: There is decreased range of motion and deformity.   Neurological: He is alert and oriented to person, place, and time. He displays no seizure activity.   Speech has improved (jaws not clenched anymore)  Cranial nerves intact  LLE 5/5 proximally, 4/5 dorsiflexion (improved), 5/5 plantar flexion, (dystonic plantar flexion on L foot is moderately improved)  BUE and RLE 5/5, normal tone  DTRs 2+ throughout   L striatal toe and R down  going toes   Bradykinesia and dysdiadochokinesia have significantly improved  Involuntary movements of the L toes are less frequent  No Pill-rolling tremor on LUE, very slight low amplitude, moderate frequency tremor on L hand fingers + slight endpoint tremor  Skin: Skin is warm. No rash noted. He is not diaphoretic.   Psychiatric: He has a normal mood and affect. His behavior is normal. His speech is not slurred. He is not actively hallucinating.       Significant Labs:     CBC:   Recent Labs   Lab 12/11/19  0555 12/12/19 0457   WBC 6.85 7.63   HGB 13.7* 13.6*   HCT 41.6 40.5    271     CMP:   Recent Labs   Lab 12/11/19  0555 12/12/19 0457   GLU 92 102    139   K 4.2 3.9    103   CO2 21* 26   BUN 16 19   CREATININE 1.1 1.1   CALCIUM 9.4 9.4   ANIONGAP 13 10   EGFRNONAA >60.0 >60.0     Urine Studies:       Recent Labs   Lab 12/09/19  1425   COLORU Yellow   APPEARANCEUA Clear   PHUR 7.0   SPECGRAV 1.015   PROTEINUA Negative   GLUCUA Negative   KETONESU Negative   BILIRUBINUA Negative   OCCULTUA Negative   NITRITE Negative   LEUKOCYTESUR Negative        Lab Results   Component Value Date    CRP 2.4 12/10/2019    SEDRATE 7 12/10/2019       Lab Results   Component Value Date    TSH 0.744 12/10/2019    THYROPEROXID <6.0 12/10/2019       Lab Results   Component Value Date    WKJ05LCNL Negative 12/10/2019    RPR Non-reactive 12/10/2019    LYMEAB 0.12 12/10/2019     Lab Results   Component Value Date    CERULOPLSM 32.0 12/10/2019     Lab Results   Component Value Date    ANASCREEN Negative <1:160 12/10/2019     Heavy metal Screen negative    Pending labs:  Paraneoplastic panel in serum  Copper  Serum ACE  Lyme  SPEP/UPEP/DELILAH  LCFA  Vitamin E  CSF WNV, Arbovirus, Lyme, VDRL, ACE, TB  CSF cytology and flowcytometry  CSF Paraneoplastic panel and MS panel      Significant Imaging:   FL Esophagram (12/11/19):  Normal esophagus without evidence of dysmotility or stricture    MRI C/T/L spine w/ w/o contrast  (12/11/19):  No acute pathology signal change within the cord  Mild DJD in cervical (C6-7) and lumbar regions (L4-5)    CT C/A/P with contrast (12/10/19):  Multiple calcified nodules throughout the lungs, favoring old granulomatous disease. Biapical scarring.  Minimal centrilobular emphysematous changes with an upper lung zone predominance  Multiple colonic diverticulae and no other acute pathology to suggest malignant process    MRI Brain w/ w/o contrast (12/9/19):  No acute intracranial abnormality  Unchanged from prior exam    MRI C spine w/o contrast (09/23/19):  1. Multilevel degenerative disc disease from C3-C5 resulting in moderate bilateral neural foraminal narrowing.  2. Degenerative disc disease at C5-C6 resulting in mild bilateral neural foraminal narrowing.  3. Degenerative disc disease at C6-C7 resulting in mild central spinal canal stenosis with severe narrowing of the right neural foramen and moderate narrowing the left neural foramen.     MRI L spine w/o contrast (09/23/19):  1. Sacralization of L5.  2. Grade 1 retrolisthesis of L4 on L5.  3. Degenerative disc disease at L4-L5 with a small central focal disc protrusion resulting in mild central spinal canal stenosis with mild bilateral neural foraminal narrowing.  4. Signal abnormality within the posterior central L4-L5 disc consistent with a small annular fissure.     MRI T spine w/o contrast (10/02/19):  Essentially normal MRI of the thoracic spine.     MRI Brain w/ w/o contrast (10/02/19):  No acute intracranial process. Normal appearing MRI of the Brain    Assessment and Plan:     * Dystonia  Young otherwise healthy M with progressive spasticity in distal LLE and L striatal toe, painful muscle spasms (L>R), BUE tremors, BLE involuntary movements, spastic speech and esophageal dysphagia, as well as sensory changes, which did not localize to a specific location or category of neurological disorder. So far inconclusive work up for toxic, infectious  (normal CSF), paraneoplastic (negative pan CT scan), inflammatory (normal CSF protein, normal spine MRI), and hereditary phenomenon. Patient has responded well to symptomatic treatment of dystonia and involuntary movements.    - CSF flow cytometry was canceled by the lab due to low cellularity  - we will follow up on the pending lab results as outpatient -> scheduled with Dr. Cunningham and Dr. Russell on January 27th.  - Discharge plan: Trihexyphenidyl to 5 mg BID + Sinemet 25 -100 mg TID     Dysphagia  Insidious-onset, progressive, to solids only  Symptoms have improved throughout hospital stay    - Recommended SLP evaluation who recommended regular diet with thin liquids  - Recommend obtaining MBSS which showed Normal esophagus without evidence of dysmotility or stricture    Difficulty with speech  Spastic speech pattern, fluctuating  Continues to improve  Cognition is intact (evaluated by speech therapy)    Abnormality of gait and mobility  Secondary to dystonic LLE  Ambulates with walker at home  Foot drop has improved with Sinemet     - PT/OT to evaluate and treat -> recommending home with     Abnormal involuntary movements  Please see Dystonia      VTE Risk Mitigation (From admission, onward)         Ordered     Place sequential compression device  Until discontinued      12/09/19 9603                Candace Wright MD  Neurology  [facility]

## 2019-12-12 NOTE — PT/OT/SLP PROGRESS
Physical Therapy Treatment    Patient Name:  Storm Hinds   MRN:  [id number]    Recommendations:     Discharge Recommendations:  home with home health   Discharge Equipment Recommendations: none   Barriers to discharge: decreased functional mobility requiring increased assistance    Assessment:     Storm Hinds is a 39 y.o. male admitted with a medical diagnosis of Dystonia.  He presents with the following impairments/functional limitations:  weakness, impaired endurance, impaired self care skills, impaired functional mobilty, gait instability, impaired balance, decreased coordination, pain, impaired fine motor. Pt tolerated session well with focus on bed mobility, transfers, and gait training. Pt progressing well with improved distance and assistance needed. Pt ambulates at [facility] with RW for ~176 ft. Pt with ataxic gait and poor L ankle ROM leading to further impairment of gait pattern. Pt will continue to benefit from therapy services to address impairments listed above.     Rehab Prognosis: Fair; patient would benefit from acute skilled PT services to address these deficits and reach maximum level of function.    Recent Surgery: * No surgery found *      Plan:     During this hospitalization, patient to be seen 3 x/week to address the identified rehab impairments via gait training, therapeutic activities, therapeutic exercises, neuromuscular re-education and progress toward the following goals:    · Plan of Care Expires:  01/10/20    Subjective     Chief Complaint: no c/o  Pain/Comfort:  · Pain Rating 1: 0/10  · Pain Rating Post-Intervention 1: other (see comments)(unratd c/o pain and soreness in L ankle)      Objective:     Communicated with NSG prior to session.  Patient found HOB elevated with no lines attached upon PTA entry to room.     General Precautions: Standard, fall   Orthopedic Precautions:N/A   Braces: N/A     Functional Mobility:  · Bed Mobility:     · Supine to Sit: modified  independence  · Sit to Supine: independence  · Transfers:     · Sit to Stand:  stand by assistance with rolling walker  · Bed to Chair: stand by assistance with  rolling walker  using  Step Transfer  · Gait: Pt ambulates ~176ft with RW and SBA. Pt with limited L ankle ROM leading to poor toe off and eventual pain and L hip external rotation to clear foot for progression. Pt overall with ataxic and step-to gait pattern.       AM-PAC 6 CLICK MOBILITY  Turning over in bed (including adjusting bedclothes, sheets and blankets)?: 3  Sitting down on and standing up from a chair with arms (e.g., wheelchair, bedside commode, etc.): 3  Moving from lying on back to sitting on the side of the bed?: 3  Moving to and from a bed to a chair (including a wheelchair)?: 3  Need to walk in hospital room?: 3  Climbing 3-5 steps with a railing?: 2  Basic Mobility Total Score: 17       Therapeutic Activities and Exercises:  Pt assisted with functional mobility as noted above.   Pt stands with Min to Mod A for balance with pt tasked with several variation of narrow base, wide base, and tandem stances with eyes open/closed and with and without perturbations to challenge pts various balance strategies. Several instances of LOB experienced, increased with narrower bases. Same with eyes open or closed.       Patient left seated on sofa in room with call button in reach and significant other present.    GOALS:   Multidisciplinary Problems     Physical Therapy Goals        Problem: Physical Therapy Goal    Goal Priority Disciplines Outcome Goal Variances Interventions   Physical Therapy Goal     PT, PT/OT Ongoing, Progressing     Description:  Goals to be met by: 1/10/2020    Patient will increase functional independence with mobility by performin. Supine to sit with Modified [place] - met  2. Sit to supine with Modified [place] - met  3. Sit to stand transfer with Supervision  4. Gait  x 30 feet with Supervision using LRAD  5.  Lower extremity exercise program x15 reps per handout, with independence                      Time Tracking:     PT Received On: 12/12/19  PT Start Time: 1059     PT Stop Time: 1125  PT Total Time (min): 26 min     Billable Minutes: Gait Training 16 and Therapeutic Activity 10    Treatment Type: Treatment  PT/PTA: PTA     PTA Visit Number: 1     Umang George PTA  12/12/2019

## 2019-12-13 LAB
A-TOCOPHEROL VIT E SERPL-MCNC: 1365 UG/DL (ref 500–1800)
FINAL PATHOLOGIC DIAGNOSIS: NORMAL
PATHOLOGIST INTERPRETATION UIFE: NORMAL
PATHOLOGIST INTERPRETATION UPE: NORMAL

## 2019-12-13 NOTE — DISCHARGE SUMMARY
[facility] Hospital Medicine  Discharge Summary      Patient Name: Storm Hinds  MRN: [id number]  Admission Date: 12/9/2019  Hospital Length of Stay: 3 days  Discharge Date and Time:  12/12/2019 8:34 PM  Attending Physician: Andreia att. providers found   Discharging Provider: Celso Chacon DO  Primary Care Provider: Elliott Wasserman MD  [facility] Medicine Team: [facility] HOSP MED 3 Celso Chacon DO    HPI:   The patient is a 39 year old male with past medical history MDD, reflux, smoking, recent LLE dystonia onset, presenting for worsening of recent symptoms and new speech difficulty. The patient's history is aided by significant other. The patient was his son's football game in September when he suddenly experienced pain and spasms in his left leg. The patient symptoms have been gradually worsening since then. Starting 4 days ago the patient started experiencing difficulty with speech when working with a rehab therapist. The problem waxes and wanes but overall worsening since then. In association he has difficulty with swallowing. Between squeezing out short clear audible phrases on questioning, the patient's face, jaw, and neck clenches. Sometimes the patient has word finding difficulty. The patient denies any loss of bowel or urinary control, but reports some decreased urine output possibly attributed to him by decreased po intake. The patient has seen multiple neurologists who have considered early onset Parkinson's and start ropinirole at one time without reported success. MRI of brain and spine in October unremarkable. Trihexylphenidyl was started with possible mild improvement of symptoms.     Patient in the past smoked up to 3 packs per day of tobacco quitting 10 years ago. He travels to [place] at times within the past year and has possibly seen tick on clothes but denies any rash or noting tic directly on skin. He does not maintain recent alcohol drinking. He denies any family history of  neurological or autoimmune disorders, but father had leukemia. Denies fever or chills.    * No surgery found *      Hospital Course:   The patient will be admitted for neurological workup of dystonia and expressive aphasia. MRI barbra ordered, ESR, CRP, paraneoplastic panel, SLP, resp mechanics, TSH, will be started on sinemet and trihexylphenidyl. 12/10 Patient tremors, bradykinesia, speech difficulty improved since sinemet started. Thyroid labs and ESR/CRP within normal limits. Respiratory mechanics NIF of -50 and VC of 1750ml. [place] of labs ordered to rule out metal or tick exposure, autoimmune causes, Pan imaging ordered.     Pt underwent successful lumbar puncture 12/11 in addition to MRI and CT as part of ongoing work up for his movement disorder. Imaging and ongoing labs unremarkable thus far, although patient improving substantially on sinemet and artane. Will discharge for follow up with PCP and neurology while labs are pending.     Physical Exam   Constitutional: He is oriented to person, place, and time. He appears well-developed and well-nourished. He appears distressed.   HENT:   Head: Normocephalic and atraumatic.   Eyes: EOM are normal. No scleral icterus.   Neck: Neck supple. No tracheal deviation present.   Cardiovascular: Normal rate, regular rhythm and normal heart sounds.   No murmur heard.  Pulmonary/Chest: Breath sounds normal. He has no wheezes. He has no rales.   Abdominal: Soft. Bowel sounds are normal. He exhibits no distension. There is no tenderness.   Musculoskeletal: He exhibits deformity. He exhibits no edema.   Neurological: He is alert and oriented to person, place, and time. He exhibits abnormal muscle tone. Coordination abnormal.   Bilateral UE and LE 5/5 strength  minimal involuntary movements    Mild difficulty with word finding but difficulty with speech minimal  Skin: Skin is warm and dry.   Psychiatric: His behavior is normal. His mood appears anxious. His speech is  slurred.    Consults:   Consults (From admission, onward)        Status Ordering Provider     Inpatient consult to neurology  Once     Provider:  (Not yet assigned)    Completed CASSIDY MCDUFFIE          * Dystonia    The patient is a 39 year old male with past medical history of smoking, chronic low back pain, HLD, recent LLE dystonia, presenting for worsening expressive aphasia for 4 days. The patient had first onset of LLE dystonia in 9/2019, now progressing with spasticity, with acute onset of aphasia this past Thursday proma. The patient has seen multiple neurologists, trying ropinirole without improvement, and has recently started trihexyphenidyl after 11/11 neurology appt. Patient possibly may have responded well to artane. Patient with smoking history, [place] travel with tick seen on clothes, father had leukemia. No neurological illness in family. He was not able to afford FIONA scan suggested by neurologist. Neurology consulted, differential including PD, PERM syndrome, partial SPS, paraneoplastic SPS.    PLAN  -neurology following, recs appreciated  -Sinemet  TID for sx relief  -Trihexylphenidyl 5mg bid sx relief  -ESR, CRP, TSH, Anti-TPO WNL  -await paraneoplastic panel w Anti-RADHA and Anti-amphiphysin Ab  - MRI of C/T/L spine w/wo contrast ordered; no evidence of demyelination  - RPR, HIV 1/2 ab, TB, Lyme Ab, GABRIEL, ACE, Heavy metal screen, Copper, Ceruloplasmin, SPEP/UPEP/DELILAH, Vitamin E, Acanthocytes in Peripheral smear, LVCFA ordered  - EEG to exclude epileptic component to the rhythmic repetitive movements of L foot ordered  - CT scan of C/A/P to r/o possible underlying malignancy ordered; negative  - Underwent LP today  -SLP consult, diet ordered, esophagram negative  -resp mechanics NIF of -50 and VC of 1750ml  -PT/OT for gait  -patient my be candidate for botox for LLE dystonia in future  -Patient greatly improved with sinemet and artane, minimal involuntary movements of difficulty speaking, will dc  with f/u for pending labs and further workup with neurology    Abnormality of gait and mobility  See dystonia        Final Active Diagnoses:    Diagnosis Date Noted POA    PRINCIPAL PROBLEM:  Dystonia [G24.9] 11/12/2019 Yes    Chest pain [R07.9]  Yes    Oropharyngeal dysphagia [R13.12]  Yes    Abnormal involuntary movements [R25.9]  Yes    Encounter for lumbar puncture [Z01.89]  Not Applicable    Difficulty with speech [R47.9] 12/10/2019 Yes    Dysphagia [R13.10] 12/09/2019 Yes    Abnormality of gait and mobility [R26.9] 12/04/2019 Yes      Problems Resolved During this Admission:       Discharged Condition: stable    Disposition: Home or Self Care    Follow Up:  Follow-up Information     [facility]    Specialty:  Neurology  Why:  Ambulatory referral sent to dept, office will contact pt w/date & time of appt.  Contact information:  Beronica Gurwinder melita  [facility] [address] [phone]  Additional information:  [address] [place] [address] - [facility]           Elliott Wasserman MD. Go on 12/20/2019.    Specialty:  Family Medicine  Why:  at 1 PM  Contact information:  [address] [facility] [place] [address]  [phone]             [facility]    Specialty:  Neurology  Contact information:  Beronica Gurwinder melita  [facility] [address] [phone]  Additional information:  [address] [place] [address]               Patient Instructions:      Ambulatory consult to Neurology   Referral Priority: Routine Referral Type: Consultation   Referral Reason: Specialty Services Required   Requested Specialty: Neurology   Number of Visits Requested: 1     Ambulatory Referral to Neurology   Referral Priority: Routine Referral Type: Consultation   Referral Reason: Specialty Services Required   Requested Specialty: Neurology   Number of Visits Requested: 1     Ambulatory referral to Neurology   Referral Priority: Routine Referral Type: Consultation   Referral Reason: Specialty Services Required    Requested Specialty: Neurology   Number of Visits Requested: 1       Significant Diagnostic Studies: Labs: All labs within the past 24 hours have been reviewed    Pending Diagnostic Studies:     Procedure Component Value Units Date/Time    ACE, CSF [[id number]] Collected:  12/11/19 1631    Order Status:  Sent Lab Status:  In process Updated:  12/11/19 1717    Specimen:  CSF (Spinal Fluid) from Cerebrospinal Fluid     Arbovirus Antibody Panel, CSF [[id number]] Collected:  12/11/19 1631    Order Status:  Sent Lab Status:  In process Updated:  12/11/19 1717    Specimen:  CSF (Spinal Fluid) from Cerebrospinal Fluid     Cytology, Fluid/Wash/Brush [[id number]] Collected:  12/11/19 1631    Order Status:  Sent Lab Status:  In process Updated:  12/12/19 1205    Fatty acids, long chain [[id number]] Collected:  12/10/19 1523    Order Status:  Sent Lab Status:  In process Updated:  12/10/19 1600    Specimen:  Blood     Narrative:       Collection has been rescheduled by TS2 at 12/10/2019 14:23 Reason:   Louann masterson will call doctors to see if any more labs will be added. I   just stuck pt at 2:10  Collection has been rescheduled by TS2 at 12/10/2019 14:23 Reason:   [id number]  Collection has been rescheduled by TS2 at 12/10/2019 14:25 Reason:   Louann masterson called back and said the doctor said to give him about   10-15 min to add more labs. still figuring out which labs will be   needed     Flow Cytometry Analysis (CSF) [[id number]] Collected:  12/11/19 1630    Order Status:  Sent Lab Status:  In process Updated:  12/11/19 1719    Specimen:  CSF (Spinal Fluid) from Cerebrospinal Fluid     Freeze and Hold,  [[id number]] Collected:  12/11/19 1630    Order Status:  Sent Lab Status:  No result     Specimen:  CSF (Spinal Fluid) from Cerebrospinal Fluid     Lyme Disease Serology, CSF [[id number]] Collected:  12/11/19 1630    Order Status:  Sent Lab Status:  In process Updated:  12/11/19 1718    Specimen:  CSF (Spinal Fluid) from Cerebrospinal  Fluid     Ms Profile [[id number]] Collected:  12/11/19 1630    Order Status:  Sent Lab Status:  In process Updated:  12/11/19 1716    Specimen:  CSF (Spinal Fluid) from Cerebrospinal Fluid     Paraneoplastic Autoantibody Eval, CSF [[id number]] Collected:  12/11/19 1631    Order Status:  Sent Lab Status:  In process Updated:  12/11/19 1717    Specimen:  CSF (Spinal Fluid) from Cerebrospinal Fluid     Paraneoplastic Autoantibody Evaulation, Serum [[id number]] Collected:  12/10/19 0720    Order Status:  Sent Lab Status:  In process Updated:  12/10/19 0738    Specimen:  Blood     VDRL, CSF [[id number]] Collected:  12/11/19 1630    Order Status:  Sent Lab Status:  In process Updated:  12/11/19 1717    Specimen:  CSF (Spinal Fluid) from Cerebrospinal Fluid     Vitamin E [[id number]] Collected:  12/10/19 1523    Order Status:  Sent Lab Status:  In process Updated:  12/10/19 1600    Specimen:  Blood     Narrative:       Collection has been rescheduled by TS2 at 12/10/2019 14:23 Reason:   Louann masterson will call doctors to see if any more labs will be added. I   just stuck pt at 2:10  Collection has been rescheduled by TS2 at 12/10/2019 14:23 Reason:   [id number]  Collection has been rescheduled by TS2 at 12/10/2019 14:25 Reason:   Louann masterson called back and said the doctor said to give him about   10-15 min to add more labs. still figuring out which labs will be   needed     West Nile Virus by PCR, CSF [[id number]] Collected:  12/11/19 1630    Order Status:  Sent Lab Status:  In process Updated:  12/11/19 1717    Specimen:  CSF (Spinal Fluid) from Cerebrospinal Fluid          Medications:  Reconciled Home Medications:      Medication List      START taking these medications    carbidopa-levodopa  mg  mg per tablet  Commonly known as:  SINEMET  Take 1 tablet by mouth 3 (three) times daily.        CHANGE how you take these medications    baclofen 5 mg Tab tablet  Commonly known as:  LIORESAL  Take 1 tablet (5 mg total) by mouth 3  (three) times daily.  What changed:    · how much to take  · when to take this  · reasons to take this     LORazepam 1 MG tablet  Commonly known as:  ATIVAN  Take 1 tablet (1 mg total) by mouth every 12 (twelve) hours as needed for Anxiety.  What changed:  when to take this     trihexyphenidyl 5 MG tablet  Commonly known as:  ARTANE  Take 1 tablet (5 mg total) by mouth 3 (three) times daily with meals.  What changed:    · medication strength  · how much to take  · when to take this  · additional instructions        CONTINUE taking these medications    buPROPion 150 MG TB24 tablet  Commonly known as:  WELLBUTRIN XL  Take 1 tablet (150 mg total) by mouth once daily.     diclofenac sodium 1 % Gel  Commonly known as:  Voltaren  Apply 2 g topically 3 (three) times daily as needed.     gabapentin 300 MG capsule  Commonly known as:  NEURONTIN  TAKE 1 CAPSULE BY MOUTH ONCE DAILY IN THE EVENING     ranitidine 300 MG tablet  Commonly known as:  Zantac  Take 1 tablet (300 mg total) by mouth every evening.            Indwelling Lines/Drains at time of discharge:   Lines/Drains/Airways     None                 Time spent on the discharge of patient: 45 minutes  Patient was seen and examined on the date of discharge and determined to be suitable for discharge.         Celso Chacon DO  Department of Hospital Medicine  [facility]

## 2019-12-13 NOTE — ASSESSMENT & PLAN NOTE
The patient is a 39 year old male with past medical history of smoking, chronic low back pain, HLD, recent LLE dystonia, presenting for worsening expressive aphasia for 4 days. The patient had first onset of LLE dystonia in 9/2019, now progressing with spasticity, with acute onset of aphasia this past Thursday proma. The patient has seen multiple neurologists, trying ropinirole without improvement, and has recently started trihexyphenidyl after 11/11 neurology appt. Patient possibly may have responded well to artane. Patient with smoking history, [place] travel with tick seen on clothes, father had leukemia. No neurological illness in family. He was not able to afford FIONA scan suggested by neurologist. Neurology consulted, differential including PD, PERM syndrome, partial SPS, paraneoplastic SPS.    PLAN  -neurology following, recs appreciated  -Sinemet  TID for sx relief  -Trihexylphenidyl 5mg bid sx relief  -ESR, CRP, TSH, Anti-TPO WNL  -await paraneoplastic panel w Anti-RADHA and Anti-amphiphysin Ab  - MRI of C/T/L spine w/wo contrast ordered; no evidence of demyelination  - RPR, HIV 1/2 ab, TB, Lyme Ab, GABRIEL, ACE, Heavy metal screen, Copper, Ceruloplasmin, SPEP/UPEP/DELILAH, Vitamin E, Acanthocytes in Peripheral smear, LVCFA ordered  - EEG to exclude epileptic component to the rhythmic repetitive movements of L foot ordered  - CT scan of C/A/P to r/o possible underlying malignancy ordered; negative  - Underwent LP today  -SLP consult, diet ordered, esophagram negative  -resp mechanics NIF of -50 and VC of 1750ml  -PT/OT for gait  -patient my be candidate for botox for LLE dystonia in future  -Patient greatly improved with sinemet and artane, minimal involuntary movements of difficulty speaking, will dc with f/u for pending labs and further workup with neurology

## 2019-12-14 LAB
ALBUMIN CSF-MCNC: 19.6 MG/DL
ALBUMIN SERPL-MCNC: 4030 MG/DL (ref 3200–4800)
B BURGDOR AB CSF IA-ACNC: 0.05 LIV
IGG CSF-MCNC: 2.3 MG/DL
IGG SERPL-MCNC: 989 MG/DL (ref 767–1590)
IGG SYNTH RATE SER+CSF CALC-MRATE: 0 MG/24 H
IGG/ALB CLEAR SER+CSF-RTO: 0.48
IGG/ALB CSF: 0.12 {RATIO}
IGG/ALB SER: 0.25 {RATIO}
M TB CMPLX DNA SPEC QL NAA+PROBE: NEGATIVE
OLIGOCLONAL BANDS CSF ELPH-IMP: 0 BANDS
OLIGOCLONAL BANDS CSF ELPH-IMP: 1 BANDS
OLIGOCLONAL BANDS SERPL: 1 BANDS
SPECIMEN SOURCE: NORMAL

## 2019-12-15 LAB
SPECIMEN SOURCE: NORMAL
WNV RNA CSF QL NAA+PROBE: NEGATIVE

## 2019-12-16 LAB
ACE CSF-CCNC: 1.7 U/L (ref 0–2.5)
ANNOTATION COMMENT IMP: NORMAL
BACTERIA CSF CULT: NO GROWTH
GRAM STN SPEC: NORMAL
PHYTANATE SERPL-SCNC: 0.6 NMOL/ML
PRISTANATE SERPL-SCNC: 0.07 NMOL/ML
PRISTANATE/PHYTANATE SERPL-SRTO: 0.12 RATIO
VDRL CSF QL: NORMAL
VLCFA C22:0 SERPL-SCNC: 67.2 NMOL/ML
VLCFA C24:0 SERPL-SCNC: 56.3 NMOL/ML
VLCFA C24:0/C22:0 SERPL-SRTO: 0.84 RATIO
VLCFA C26:0 SERPL-SCNC: 0.55 NMOL/ML
VLCFA C26:0/C22:0 SERPL-SRTO: 0.01 RATIO

## 2019-12-17 ENCOUNTER — OFFICE VISIT (OUTPATIENT)
Dept: FAMILY MEDICINE | Facility: CLINIC | Age: 39
End: 2019-12-17
Payer: COMMERCIAL

## 2019-12-17 VITALS
RESPIRATION RATE: 15 BRPM | DIASTOLIC BLOOD PRESSURE: 80 MMHG | TEMPERATURE: 98 F | HEIGHT: 72 IN | OXYGEN SATURATION: 97 % | WEIGHT: 207.19 LBS | HEART RATE: 87 BPM | SYSTOLIC BLOOD PRESSURE: 124 MMHG | BODY MASS INDEX: 28.06 KG/M2

## 2019-12-17 DIAGNOSIS — G24.9 DYSTONIA: ICD-10-CM

## 2019-12-17 DIAGNOSIS — Z09 HOSPITAL DISCHARGE FOLLOW-UP: Primary | ICD-10-CM

## 2019-12-17 LAB
EEEV IGG TITR CSF IF: NORMAL {TITER}
EEEV IGM TITR CSF IF: NORMAL {TITER}
LACV IGG CSF QL IF: NORMAL
LACV IGM CSF QL IF: NORMAL
SLEV IGG TITR CSF IF: NORMAL {TITER}
SLEV IGM TITR CSF IF: NORMAL {TITER}
WEEV IGG TITR CSF IF: NORMAL {TITER}
WEEV IGM TITR CSF IF: NORMAL {TITER}

## 2019-12-17 PROCEDURE — 3008F BODY MASS INDEX DOCD: CPT | Mod: CPTII,S$GLB,, | Performed by: FAMILY MEDICINE

## 2019-12-17 PROCEDURE — 99214 OFFICE O/P EST MOD 30 MIN: CPT | Mod: S$GLB,,, | Performed by: FAMILY MEDICINE

## 2019-12-17 PROCEDURE — 99214 PR OFFICE/OUTPT VISIT, EST, LEVL IV, 30-39 MIN: ICD-10-PCS | Mod: S$GLB,,, | Performed by: FAMILY MEDICINE

## 2019-12-17 PROCEDURE — G0180 MD CERTIFICATION HHA PATIENT: HCPCS | Mod: ,,, | Performed by: INTERNAL MEDICINE

## 2019-12-17 PROCEDURE — 3008F PR BODY MASS INDEX (BMI) DOCUMENTED: ICD-10-PCS | Mod: CPTII,S$GLB,, | Performed by: FAMILY MEDICINE

## 2019-12-17 PROCEDURE — G0180 PR HOME HEALTH MD CERTIFICATION: ICD-10-PCS | Mod: ,,, | Performed by: INTERNAL MEDICINE

## 2019-12-17 RX ORDER — TRIHEXYPHENIDYL HYDROCHLORIDE 5 MG/1
5 TABLET ORAL
Qty: 90 TABLET | Refills: 11 | Status: SHIPPED | OUTPATIENT
Start: 2019-12-17 | End: 2020-01-27 | Stop reason: SINTOL

## 2019-12-17 NOTE — LETTER
December 17, 2019      [facility]  [address] [place] [address]  Phone: [phone]  Fax: [phone]       Patient: Storm Hinds   YOB: 1980  Date of Visit: 12/17/2019    To Whom It May Concern:    Taqueria Hinds  was at [facility] on 12/17/2019. He may return to work on 01/17/2020  with no restrictions. If you have any questions or concerns, or if I can be of further assistance, please do not hesitate to contact me.    Sincerely,      Elliott Wasserman MD

## 2019-12-17 NOTE — LETTER
December 17, 2019      [facility]  [address] [place] [address]  Phone: [phone]  Fax: [phone]       Patient: Storm Hinds   YOB: 1980  Date of Visit: 12/17/2019    To Whom It May Concern:    Taqueria Hinds  was at [facility] on 12/12/2019 . He may return to work on 01/17/2020 with no restrictions. If you have any questions or concerns, or if I can be of further assistance, please do not hesitate to contact me.    Sincerely,    Elliott Wasserman MD

## 2019-12-18 NOTE — PROGRESS NOTES
[facility] - Clinic Note    Subjective      Mr. Hinds is a 39 y.o. male who presents to clinic for Follow-up (discharged on 12/12/19)    Patient presents for hospital discharge follow-up.  He had a battery of tests done.  The spasm is still present his leg but his speech and swallowing has improved since starting the Sinemet and increasing the dose of Artane.  He has had a headache that has been present since after his LP.  It is improving.  His workup thus far has been negative.  He has home health with physical therapy and occupational therapy coming through.    PM Storm has a past medical history of Acid reflux, Depression, Muscle spasm, Pain, and Tremors of nervous system.   PSXH Storm has no past surgical history on file.    Storm's family history includes Cancer in his father; Hypertension in his mother, paternal grandfather, and paternal grandmother; Stroke in his mother.   SH Storm reports that he has quit smoking. He has quit using smokeless tobacco. He reports that he does not drink alcohol or use drugs.   ALG Storm has No Known Allergies.   MED Storm has a current medication list which includes the following prescription(s): baclofen, bupropion, carbidopa-levodopa  mg, gabapentin, lorazepam, ranitidine, trihexyphenidyl, and diclofenac sodium.     Review of Systems   Constitutional: Negative for chills and fever.   Musculoskeletal: Positive for arthralgias and gait problem.   Neurological: Positive for headaches.     Objective     /80 (BP Location: Left arm, Patient Position: Sitting, BP Method: X-Large (Automatic))   Pulse 87   Temp 98.3 °F (36.8 °C) (Oral)   Resp 15   Ht 6' (1.829 m)   Wt 94 kg (207 lb 3.2 oz)   SpO2 97%   BMI 28.10 kg/m²     Physical Exam   Constitutional: He appears well-developed and well-nourished. No distress.   HENT:   Right Ear: External ear normal.   Left Ear: External ear normal.   Eyes: Right eye exhibits no discharge. Left eye exhibits no  discharge.   Cardiovascular: Normal rate.   Pulmonary/Chest: Effort normal and breath sounds normal.   Musculoskeletal: He exhibits no edema.   Gait is still abnormal. Muscular spasm has improved.    Neurological: He is alert.   Skin: Skin is warm and dry.   Psychiatric: He has a normal mood and affect.   Nursing note and vitals reviewed.     Assessment/Plan     1. Hospital discharge follow-up     2. Dystonia  trihexyphenidyl (ARTANE) 5 MG tablet     Continue medications as prescribed.  Follow-up pending tests.  Keep follow-up with Neurology.  Will add speech therapy to occupational and physical therapy.    Follow up in about 4 weeks (around 1/14/2020) for Follow-up.    Future Appointments   Date Time Provider Department Center   1/27/2020  2:00 PM Niki Cunningham MD [facility] NEURO [place] Gladis Wasserman MD  Family Medicine  [facility]

## 2019-12-22 ENCOUNTER — PATIENT MESSAGE (OUTPATIENT)
Dept: FAMILY MEDICINE | Facility: CLINIC | Age: 39
End: 2019-12-22

## 2019-12-22 DIAGNOSIS — M62.81 MUSCLE WEAKNESS (GENERALIZED): ICD-10-CM

## 2019-12-23 DIAGNOSIS — R20.0 LEFT ARM NUMBNESS: ICD-10-CM

## 2019-12-23 DIAGNOSIS — R20.0 RIGHT LEG NUMBNESS: ICD-10-CM

## 2019-12-23 DIAGNOSIS — M62.838 MUSCLE SPASM: ICD-10-CM

## 2019-12-23 LAB
AMPHIPHYSIN AB TITR CSF: NEGATIVE TITER
CV2 IGG TITR CSF: NEGATIVE TITER
GLIAL NUC TYPE 1 AB TITR CSF: NEGATIVE TITER
HU1 AB TITR CSF IF: NEGATIVE TITER
HU2 AB TITR CSF IF: NEGATIVE TITER
HU3 AB TITR CSF: NEGATIVE TITER
PARANEOPLASTIC INTERPRETATION,CSF: NORMAL
PCA-2 AB TITR CSF: NEGATIVE TITER
PCA-TR AB TITR CSF: NEGATIVE TITER
PNEOE REFLEX TEST ADDED: NORMAL
PURKINJE CELLS AB TITR CSF IF: NEGATIVE TITER

## 2019-12-23 RX ORDER — BACLOFEN 10 MG/1
10 TABLET ORAL DAILY PRN
Qty: 30 TABLET | Refills: 2 | Status: SHIPPED | OUTPATIENT
Start: 2019-12-23 | End: 2020-01-15 | Stop reason: SDUPTHER

## 2019-12-23 RX ORDER — GABAPENTIN 300 MG/1
CAPSULE ORAL
Qty: 90 CAPSULE | Refills: 1 | Status: SHIPPED | OUTPATIENT
Start: 2019-12-23 | End: 2020-07-10

## 2019-12-23 RX ORDER — LORAZEPAM 1 MG/1
1 TABLET ORAL EVERY 12 HOURS PRN
Qty: 60 TABLET | Refills: 2 | Status: SHIPPED | OUTPATIENT
Start: 2019-12-23 | End: 2020-05-07

## 2019-12-23 NOTE — TELEPHONE ENCOUNTER
I sent in refills of the lorazepam. Instead of zantac take pepcid 10 mg twice a day which should be over the counter

## 2019-12-23 NOTE — TELEPHONE ENCOUNTER
Spoke with wife and notified refill sent to pharmacy and substitute recommended for Zantac.  Voiced Understanding.

## 2019-12-23 NOTE — TELEPHONE ENCOUNTER
Patient requesting refill on gabapentin and baclofen.  Last OV 12/17/2019  Next OV 01/27/2020  Please advise, Thank you

## 2019-12-26 ENCOUNTER — EXTERNAL HOME HEALTH (OUTPATIENT)
Dept: HOME HEALTH SERVICES | Facility: HOSPITAL | Age: 39
End: 2019-12-26
Payer: COMMERCIAL

## 2019-12-27 ENCOUNTER — PATIENT MESSAGE (OUTPATIENT)
Dept: FAMILY MEDICINE | Facility: CLINIC | Age: 39
End: 2019-12-27

## 2020-01-02 ENCOUNTER — PATIENT OUTREACH (OUTPATIENT)
Dept: ADMINISTRATIVE | Facility: HOSPITAL | Age: 40
End: 2020-01-02

## 2020-01-02 ENCOUNTER — PATIENT MESSAGE (OUTPATIENT)
Dept: FAMILY MEDICINE | Facility: CLINIC | Age: 40
End: 2020-01-02

## 2020-01-02 DIAGNOSIS — F43.21 SITUATIONAL DEPRESSION: ICD-10-CM

## 2020-01-02 RX ORDER — BUPROPION HYDROCHLORIDE 150 MG/1
150 TABLET ORAL DAILY
Qty: 30 TABLET | Refills: 2 | Status: SHIPPED | OUTPATIENT
Start: 2020-01-02 | End: 2020-01-15 | Stop reason: SDUPTHER

## 2020-01-08 ENCOUNTER — TELEPHONE (OUTPATIENT)
Dept: FAMILY MEDICINE | Facility: CLINIC | Age: 40
End: 2020-01-08

## 2020-01-08 NOTE — TELEPHONE ENCOUNTER
01/08/2020 4:33pm--- Gave RICARDO Luna PT verbal orders for speech therapy. Will send orders to sign. Thanks      ----- Message from Loren Roman sent at 1/8/2020  3:56 PM CST -----  Contact: Cheryl Patient's home health physical therapist  Type: Needs Medical Advice    Who Called:  Cheryl, Patient's home health physical therapist  Best Call Back Number:   Additional Information: Calling to request verbal orders for speech therapy.

## 2020-01-12 ENCOUNTER — PATIENT MESSAGE (OUTPATIENT)
Dept: NEUROLOGY | Facility: CLINIC | Age: 40
End: 2020-01-12

## 2020-01-12 DIAGNOSIS — G24.9 DYSTONIA: Primary | ICD-10-CM

## 2020-01-14 ENCOUNTER — OFFICE VISIT (OUTPATIENT)
Dept: FAMILY MEDICINE | Facility: CLINIC | Age: 40
End: 2020-01-14
Payer: COMMERCIAL

## 2020-01-14 VITALS
OXYGEN SATURATION: 98 % | SYSTOLIC BLOOD PRESSURE: 123 MMHG | BODY MASS INDEX: 28.04 KG/M2 | RESPIRATION RATE: 16 BRPM | TEMPERATURE: 98 F | DIASTOLIC BLOOD PRESSURE: 85 MMHG | HEIGHT: 72 IN | HEART RATE: 93 BPM | WEIGHT: 207 LBS

## 2020-01-14 DIAGNOSIS — H54.7 REDUCED VISUAL ACUITY: ICD-10-CM

## 2020-01-14 DIAGNOSIS — F43.21 SITUATIONAL DEPRESSION: ICD-10-CM

## 2020-01-14 DIAGNOSIS — G24.9 DYSTONIA: Primary | ICD-10-CM

## 2020-01-14 PROCEDURE — 99214 OFFICE O/P EST MOD 30 MIN: CPT | Mod: S$GLB,,, | Performed by: FAMILY MEDICINE

## 2020-01-14 PROCEDURE — 3008F PR BODY MASS INDEX (BMI) DOCUMENTED: ICD-10-PCS | Mod: S$GLB,,, | Performed by: FAMILY MEDICINE

## 2020-01-14 PROCEDURE — 99214 PR OFFICE/OUTPT VISIT, EST, LEVL IV, 30-39 MIN: ICD-10-PCS | Mod: S$GLB,,, | Performed by: FAMILY MEDICINE

## 2020-01-14 PROCEDURE — 3008F BODY MASS INDEX DOCD: CPT | Mod: S$GLB,,, | Performed by: FAMILY MEDICINE

## 2020-01-14 NOTE — LETTER
January 14, 2020      [facility]  [address] [place] [address]  Phone: [phone]  Fax: [phone]       Patient: Storm Hinds   YOB: 1980  Date of Visit: 12/17/2019    To Whom It May Concern:    Taqueria Hinds  was at [facility] on 1/14/2020 . He may return to work on 02/14/2020 with no restrictions. If you have any questions or concerns, or if I can be of further assistance, please do not hesitate to contact me.    Sincerely,      Elliott Wasserman MD

## 2020-01-15 RX ORDER — BUPROPION HYDROCHLORIDE 300 MG/1
300 TABLET ORAL DAILY
Qty: 90 TABLET | Refills: 3 | Status: SHIPPED | OUTPATIENT
Start: 2020-01-15 | End: 2020-03-13 | Stop reason: SDUPTHER

## 2020-01-15 RX ORDER — BACLOFEN 10 MG/1
10 TABLET ORAL 3 TIMES DAILY PRN
Qty: 90 TABLET | Refills: 2 | Status: SHIPPED | OUTPATIENT
Start: 2020-01-15 | End: 2020-01-18

## 2020-01-16 NOTE — PROGRESS NOTES
[facility] - Clinic Note    Subjective      Mr. Hinds is a 39 y.o. male who presents to clinic for Follow-up    Reports that he has been doing well and improving with physical therapy but over the weekend he had a bad day.  He states on Saturday he did not get up till 330 and was having pain in his legs particularly in his left leg.  He states that he has ups and Downs.  The symptoms when he walks improves when he does not focus on him walking.  Reports that he is not eating a whole lot.  He is becoming more angry at times.    PM Storm has a past medical history of Acid reflux, Depression, Muscle spasm, Pain, and Tremors of nervous system.   PSXH Storm has no past surgical history on file.    Storm's family history includes Cancer in his father; Hypertension in his mother, paternal grandfather, and paternal grandmother; Stroke in his mother.   SH Storm reports that he has quit smoking. He has quit using smokeless tobacco. He reports that he does not drink alcohol or use drugs.   ALG Storm has No Known Allergies.   MED Storm has a current medication list which includes the following prescription(s): baclofen, bupropion, carbidopa-levodopa  mg, gabapentin, lorazepam, trihexyphenidyl, and diclofenac sodium.     Review of Systems   Constitutional: Negative for chills and fever.   Musculoskeletal: Positive for arthralgias and gait problem.     Objective     /85 (BP Location: Left arm, Patient Position: Sitting, BP Method: X-Large (Automatic))   Pulse 93   Temp 98.1 °F (36.7 °C) (Oral)   Resp 16   Ht 6' (1.829 m)   Wt 93.9 kg (207 lb)   SpO2 98%   BMI 28.07 kg/m²     Physical Exam   Constitutional: He appears well-developed and well-nourished. No distress.   HENT:   Right Ear: External ear normal.   Left Ear: External ear normal.   Eyes: Right eye exhibits no discharge. Left eye exhibits no discharge.   Cardiovascular: Normal rate, regular rhythm and normal heart sounds.   Pulmonary/Chest:  Effort normal and breath sounds normal. He has no wheezes. He has no rales.   Musculoskeletal:   Spasm and gait have improved but are still abnormal   Neurological: He is alert.   Skin: Skin is warm and dry.   Psychiatric: He has a normal mood and affect.   Nursing note and vitals reviewed.     Assessment/Plan     1. Dystonia  baclofen (LIORESAL) 10 MG tablet   2. Reduced visual acuity  Ambulatory referral to Ophthalmology   3. Situational depression  buPROPion (WELLBUTRIN XL) 300 MG 24 hr tablet     Still unclear etiology of patient's symptoms.  There has been some improvement since starting the medication.  Will continue for now.  Patient has follow-up with neurology later this month.  Will increase bupropion to 300 mg given increase in depressive/anxiety symptoms.  He has noted some decrease in his vision; will refer to ophthalmology for eye exam given his other neurological issues.    Follow up in about 4 weeks (around 2/11/2020) for follow up.    Future Appointments   Date Time Provider Department Center   1/27/2020  2:00 PM Niki Cunningham MD [facility] NEURO [facility]   2/18/2020 11:20 AM Elliott Wasserman MD [facility] AMALIA Wasserman MD  Family Medicine  [facility]

## 2020-01-18 RX ORDER — BACLOFEN 20 MG/1
20 TABLET ORAL 3 TIMES DAILY
Qty: 90 TABLET | Refills: 11 | Status: SHIPPED | OUTPATIENT
Start: 2020-01-18 | End: 2021-02-08 | Stop reason: SDUPTHER

## 2020-01-22 LAB
ACHR BIND AB SER-SCNC: NORMAL NMOL/L
AMPHIPHYSIN AB TITR SER: NEGATIVE TITER
CV2 IGG TITR SER: NEGATIVE TITER
GLIAL NUC TYPE 1 AB TITR SER: NEGATIVE TITER
HU1 AB TITR SER: NEGATIVE TITER
HU2 AB TITR SER IF: NEGATIVE TITER
HU3 AB TITR SER: NEGATIVE TITER
IMMUNOLOGIST REVIEW: NORMAL
NACHR AB SER-SCNC: 0 NMOL/L
PAVAL REFLEX TEST ADDED: NORMAL
PCA-1 AB TITR SER: NEGATIVE TITER
PCA-2 AB TITR SER: NEGATIVE TITER
PCA-TR AB TITR SER: NEGATIVE TITER
STRIA MUS AB TITR SER: NEGATIVE TITER
VGCC-N BIND AB SER-SCNC: 0 NMOL/L
VGCC-P/Q BIND AB SER-SCNC: 0 NMOL/L
VGKC AB SER-SCNC: 0 NMOL/L

## 2020-01-26 ENCOUNTER — PATIENT OUTREACH (OUTPATIENT)
Dept: ADMINISTRATIVE | Facility: OTHER | Age: 40
End: 2020-01-26

## 2020-01-27 ENCOUNTER — OFFICE VISIT (OUTPATIENT)
Dept: NEUROLOGY | Facility: CLINIC | Age: 40
End: 2020-01-27
Payer: COMMERCIAL

## 2020-01-27 VITALS
HEIGHT: 72 IN | WEIGHT: 195 LBS | SYSTOLIC BLOOD PRESSURE: 133 MMHG | DIASTOLIC BLOOD PRESSURE: 85 MMHG | HEART RATE: 97 BPM | BODY MASS INDEX: 26.41 KG/M2

## 2020-01-27 DIAGNOSIS — R68.2 DRY MOUTH: ICD-10-CM

## 2020-01-27 DIAGNOSIS — I10 ESSENTIAL HYPERTENSION: ICD-10-CM

## 2020-01-27 DIAGNOSIS — G20.A1 PARKINSONS DISEASE: ICD-10-CM

## 2020-01-27 DIAGNOSIS — G43.719 INTRACTABLE CHRONIC MIGRAINE WITHOUT AURA AND WITHOUT STATUS MIGRAINOSUS: ICD-10-CM

## 2020-01-27 DIAGNOSIS — H04.129 DRY EYE: ICD-10-CM

## 2020-01-27 DIAGNOSIS — G24.9 DYSTONIA: Primary | ICD-10-CM

## 2020-01-27 PROBLEM — R13.10 DYSPHAGIA: Status: RESOLVED | Noted: 2019-12-09 | Resolved: 2020-01-27

## 2020-01-27 PROBLEM — R47.9 DIFFICULTY WITH SPEECH: Status: RESOLVED | Noted: 2019-12-10 | Resolved: 2020-01-27

## 2020-01-27 PROCEDURE — 99214 OFFICE O/P EST MOD 30 MIN: CPT | Mod: S$GLB,,, | Performed by: STUDENT IN AN ORGANIZED HEALTH CARE EDUCATION/TRAINING PROGRAM

## 2020-01-27 PROCEDURE — 3008F PR BODY MASS INDEX (BMI) DOCUMENTED: ICD-10-PCS | Mod: CPTII,S$GLB,, | Performed by: STUDENT IN AN ORGANIZED HEALTH CARE EDUCATION/TRAINING PROGRAM

## 2020-01-27 PROCEDURE — 3075F PR MOST RECENT SYSTOLIC BLOOD PRESS GE 130-139MM HG: ICD-10-PCS | Mod: CPTII,S$GLB,, | Performed by: STUDENT IN AN ORGANIZED HEALTH CARE EDUCATION/TRAINING PROGRAM

## 2020-01-27 PROCEDURE — 99999 PR PBB SHADOW E&M-EST. PATIENT-LVL IV: ICD-10-PCS | Mod: PBBFAC,,, | Performed by: STUDENT IN AN ORGANIZED HEALTH CARE EDUCATION/TRAINING PROGRAM

## 2020-01-27 PROCEDURE — 3079F PR MOST RECENT DIASTOLIC BLOOD PRESSURE 80-89 MM HG: ICD-10-PCS | Mod: CPTII,S$GLB,, | Performed by: STUDENT IN AN ORGANIZED HEALTH CARE EDUCATION/TRAINING PROGRAM

## 2020-01-27 PROCEDURE — 99999 PR PBB SHADOW E&M-EST. PATIENT-LVL IV: CPT | Mod: PBBFAC,,, | Performed by: STUDENT IN AN ORGANIZED HEALTH CARE EDUCATION/TRAINING PROGRAM

## 2020-01-27 PROCEDURE — 99214 PR OFFICE/OUTPT VISIT, EST, LEVL IV, 30-39 MIN: ICD-10-PCS | Mod: S$GLB,,, | Performed by: STUDENT IN AN ORGANIZED HEALTH CARE EDUCATION/TRAINING PROGRAM

## 2020-01-27 PROCEDURE — 3079F DIAST BP 80-89 MM HG: CPT | Mod: CPTII,S$GLB,, | Performed by: STUDENT IN AN ORGANIZED HEALTH CARE EDUCATION/TRAINING PROGRAM

## 2020-01-27 PROCEDURE — 3075F SYST BP GE 130 - 139MM HG: CPT | Mod: CPTII,S$GLB,, | Performed by: STUDENT IN AN ORGANIZED HEALTH CARE EDUCATION/TRAINING PROGRAM

## 2020-01-27 PROCEDURE — 3008F BODY MASS INDEX DOCD: CPT | Mod: CPTII,S$GLB,, | Performed by: STUDENT IN AN ORGANIZED HEALTH CARE EDUCATION/TRAINING PROGRAM

## 2020-01-27 RX ORDER — VERAPAMIL HYDROCHLORIDE 120 MG/1
120 TABLET, FILM COATED, EXTENDED RELEASE ORAL NIGHTLY
Qty: 30 TABLET | Refills: 11 | Status: SHIPPED | OUTPATIENT
Start: 2020-01-27 | End: 2020-02-14 | Stop reason: SINTOL

## 2020-01-27 NOTE — PROGRESS NOTES
See resident note.  We saw the patient together, I examined individually, and we discussed the assessment and plan as she documented in her note.  The spasm/dystonia is better (responds to levodopa), but still unable to drive or work because of cognitive slowing and feeling of motion (similar to mal debarquement syndrome in description).   -> Janey scan   -> trial of verapamil   -> agree with d/c artane (patient has already discontinued)   -> continue carbidopa-levodopa

## 2020-01-27 NOTE — PROGRESS NOTES
"NEUROLOGY OUTPATIENT CLINIC NOTE    Patient Name:  Storm Hinds  Patient MRN:  [id number]    Interval History 01/27/2020:  Patient is doing fairly well since our last visit.  Wife accompanies him and assists with history.  After his hospitalization, he and his wife noted confusion and losing periods of time frequently.  They decided to wean off of his medications.  They stopped the trihexyphenidyl slowly and noticed some improvement in his mental status.  They then weaned off of his sinemet and he stated that this was like doing a "alem dive."  He began to have much more rigidity and tremors (states he was shaking the [employer] at [employer]).  They increased the baclofen soon after but his symptoms have improved and suspect that resuming the sinemet helped more than the increased dose of baclofen.  He is noting dry eye, dry mouth symptoms.  Asked that he try to wean off of the baclofen slowly to see if his symptoms changed.  Notes additional headaches which feel like tension around his forehead, but are associated with some dizziness and blurred vision.  Describes the dizziness as feeling woozy with motion, feels better if he lies flat on the ground.  Associated symptoms include some emotional lability (tends to get angry more quickly than his previous baseline).  Takes his sinemet around 6-7 am, 12 pm, and 6-7 pm.  Does not notice off time but then wife states that she can really tell if he's forgotten to take it.  Discussed diagnoses of dopa-responsive dystonia vs atypical parkinsonism and need for FIONA scan to help differentiate.    HPI:  Patient is a 39 y.o. male with PMHx of chronic low back pain and HLD who presents to [facility] 11/11/19 for 2 months of painful LLE spasticity affecting gait and keeping patient from working as an .  Patient states that he was going to his son's football game on 09/16/19 when he started to have some pain in the LLE.  He ignored it, but noticed at the game that " the leg continued to get tighter and more painful throughout the game.  He agreed to go to the doctor to establish care and saw his new PCP Dr. Wasserman on 9/18/19.  XRs of the spine following this visit were unrevealing and he followed up with MRIs of the spine which also did not seem to explain the muscle spasms.  Patient notes that the leg stays in an extended posture with the knee almost locked (though he can force it to flex) and the foot with an upgoing great toe, but fanning of the other toes and slight eversion of the foot.  Wife states he has been walking around at home by leaning with his R side against the wall to allow his LLE to stay extended while bearing some weight.  Associated symptoms include LLE cramping and sharp/shooting pains, some subjective hyposthesia of LLE, LLE feeling cold intermittently, some erythema of LLE intermittently, diffuse tremors, mild RLE muscle spasms, intermittent jaw clenching, some constipation, decreased urinary output, intermittent tinnitus, and intermittent blurring of vision.  Patient has been started on baclofen and tizanidine which help to relieve the symptoms somewhat but also are very sedating to the patient--states that he would not be able to take these at work.  He saw a neurologist in [place] who they state did not spend much time with them, stated that he likely had early Parkinson's, and started ropinirole 0.5 mg tid.  Since starting the ropinirole, patient has had some depressed mood and does not feel that it has made any difference in his LLE.  Patient denies family hx of PD, other neurologic disease, or autoimmune disease.  He does have the recent constipation and depression (now on bupriopion) but denies REM sleep behavior disorder or constipation.  He and his wife have noticed a fine tremor in all extremities, low amplitude but high frequency with no clear relieving or exacerbating factors.    ROS:  General:  No fever, no chills, + fatigue, no change  in weight  HEENT:  No headache, no changes in vision  Respiratory:  No cough, no SOB  Cardiovascular:  No chest pain, no palpitations  GI:  No abdominal pain, no n/v/d, +occasional constipation relieved by laxative  :  +decreased UOP with voiding  Skin:  No rashes, no pruritus, no wounds  Musculoskeletal:  + myalgias, no arthralgias  Hematologic:  No easy bruising or bleeding  Endocrine:  No heat or cold intolerance, no polyuria/polydipsia  Neuro:  + tremors, no focal weakness, no paresthesias  Psych:  No anxiety, + depression    PMHx:  Patient Active Problem List   Diagnosis    Arthralgia of multiple joints    Elevated liver enzymes    Elevated cholesterol with elevated triglycerides    Dystonia    Muscle weakness (generalized)    Abnormality of gait and mobility    Abnormal involuntary movements     PSHx:  No past surgical history on file.    Medications:  Current Outpatient Medications on File Prior to Visit   Medication Sig Dispense Refill    baclofen (LIORESAL) 20 MG tablet Take 1 tablet (20 mg total) by mouth 3 (three) times daily. 90 tablet 11    buPROPion (WELLBUTRIN XL) 300 MG 24 hr tablet Take 1 tablet (300 mg total) by mouth once daily. DO NOT CRUSH OR CHEW; SWALLOW WHOLE. 90 tablet 3    carbidopa-levodopa  mg (SINEMET)  mg per tablet Take 1 tablet by mouth 3 (three) times daily. 90 tablet 11    gabapentin (NEURONTIN) 300 MG capsule TAKE 1 CAPSULE BY MOUTH ONCE DAILY IN THE EVENING 90 capsule 1    LORazepam (ATIVAN) 1 MG tablet Take 1 tablet (1 mg total) by mouth every 12 (twelve) hours as needed for Anxiety. 60 tablet 2    [DISCONTINUED] trihexyphenidyl (ARTANE) 5 MG tablet Take 1 tablet (5 mg total) by mouth 3 (three) times daily with meals. 90 tablet 11    diclofenac sodium (VOLTAREN) 1 % Gel Apply 2 g topically 3 (three) times daily as needed. 100 g 1     No current facility-administered medications on file prior to visit.      Allergies:  Review of patient's allergies  indicates:  No Known Allergies     Family Hx:  Leukemia--father  Stroke--maternal grandmother    Social Hx:  Patient is  with three kids.  Worked as an , very much missing his work from being out the past 2 months.  Denies tobacco use, rare (almost no) EtOH use, no illicits.  Has a superstition that all the men in his family that get sick before or during March end up dying in March if they are not better, jokes that he has to get the LLE issues fixed before March or else he'll die.    Physical Exam:  /85   Pulse 97   Ht 6' (1.829 m)   Wt 88.5 kg (195 lb)   BMI 26.45 kg/m²   General:  Well-developed, well-nourished, nad  HEENT:  NCAT, PERRL, EOMI, oropharygneal membranes non-erythematous/without exudate  Neck:  Supple, normal ROM without nuchal rigidity, + some jaw clenching  Respiratory:  Symmetric expansion, no increased wob  CVS:  No LE edema. Extremities warm/well-perfused  GI:  Abd soft, non-distended  Skin:  No visible rashes or wounds  Psych:  Pleasant, cooperative with exam.   Neurologic Exam:  Mental Status:  AAOx3.  Converses easily.   Cranial Nerves:  PERRLA, EOMI.  Facial movement and sensation intact, symmetric. Tongue protrudes midline, palate raises symmetrically.  Trapezius 5/5 bilaterally.  Hearing intact bilaterally.  Motor:  Markedly increased tone in LLE, mild increase in tone throughout other extremities. BUE 5/5 throughout.  RLE hip flexion 5/5, knee flexion/extension 5/5, dorsiflexion/plantarflexion 5/5.  LLE hip flexion limited 2/2 pain and muscle tone but 4-/5, knee flexion 5/5, knee extension limited by tone 3/5, dorsiflexion of the foot 3/5 and limited by tone, plantarflexion of the foot 4/5.  Sensory:  Diminished vibratory sensation in LLE compared to RLE at digits.  Subjective decreased temperature sensation in LLE.  Reflexes:  Bilateral biceps, brachioradialis reflexes 2+.  Bilateral patellar reflexes 3+.  LLE with upgoing toe on Babinski testing, neutral at  rest (improved from previous exam).  No ankle clonus.  Coordination:  +low amplitude, high frequency tremor in BUE.  Slight slowing in LUE on KAIN, finger tapping.  FNF intact w/o dysmetria or ataxia.  Gait:  LLE remains extended with patient compensating by leaning with trunk toward the right.  Avoids putting weight on LLE.    Labs:  Results: CBC:   Lab Results   Component Value Date/Time    WBC 7.63 2019 04:57 AM    RBC 4.61 2019 04:57 AM    HGB 13.6 (L) 2019 04:57 AM    HCT 40.5 2019 04:57 AM     2019 04:57 AM    MCV 88 2019 04:57 AM    MCH 29.5 2019 04:57 AM    MCHC 33.6 2019 04:57 AM     CMP:   Lab Results   Component Value Date/Time     2019 04:57 AM    CALCIUM 9.4 2019 04:57 AM    ALBUMIN 4.1 2019 12:51 PM    PROT 7.6 2019 12:51 PM     2019 04:57 AM    K 3.9 2019 04:57 AM    CO2 26 2019 04:57 AM     2019 04:57 AM    BUN 19 2019 04:57 AM    CREATININE 1.1 2019 04:57 AM    ALKPHOS 81 2019 12:51 PM    ALT 29 2019 12:51 PM    AST 25 2019 12:51 PM    BILITOT 0.5 2019 12:51 PM     Imagin19 MRI C spine w/o contrast:  1. Multilevel degenerative disc disease from C3-C5 resulting in moderate bilateral neural foraminal narrowing.  2. Degenerative disc disease at C5-C6 resulting in mild bilateral neural foraminal narrowing.  3. Degenerative disc disease at C6-C7 resulting in mild central spinal canal stenosis with severe narrowing of the right neural foramen and moderate narrowing the left neural foramen.    19 MRI L spine w/o contrast:  1. Sacralization of L5.  2. Grade 1 retrolisthesis of L4 on L5.  3. Degenerative disc disease at L4-L5 with a small central focal disc protrusion resulting in mild central spinal canal stenosis with mild bilateral neural foraminal narrowing.  4. Signal abnormality within the posterior central L4-L5 disc consistent  with a small annular fissure.    10/02/19 MRI T spine w/o contrast:  Essentially normal MRI of the thoracic spine.    10/02/19 MRI Brain w/ w/o contrast:  No acute intracranial process. Normal appearing MRI of the Brain.    Additional Diagnotic Testing:  N/a    ASSESSMENT/PLAN:  Patient is a 39 y.o. male with a PMHx of chronic low back pain and HLD who presents to [facility] 11/11/19 due to 2 month course of painful LLE spasticity affecting gait.    Chief Complaint   Patient presents with    Consult     Problem List Items Addressed This Visit        1 - High    Dystonia - Primary    Overview     Onset of dystonic posture in LLE 09/16/19.  Initial partial response to trihexyphenidyl, but did not tolerate (side effects of confusion, speech difficulty).  Responds to carbidopa-levodopa, concern for dopamine-responsive dystonia versus atypical parkinsonism.  Will be important to treatment plan to obtain FIONA scan, likely to .         Current Assessment & Plan     -Continue carbidopa-levodopa  mg tid  -Discontinuing trihexyphenidyl 2/2 side effects--check vitamin levels to r/o confounding factors  -Will need FIONA scan to determine dopamine-responsive dystonia vs parkinsonism, outcome of this study is likely to change treatment plan going forward          Relevant Orders    VITAMIN B1 (Completed)    VITAMIN B2 (Completed)    VITAMIN B6 (Completed)    VITAMIN D (Completed)       2     Intractable chronic migraine without aura and without status migrainosus    Overview     Frequent headaches with vertigo, associated w/ nausea and light sensitivity. Does better with laying still in dark room during headaches.         Current Assessment & Plan     -Trial of verapamil 120 mg qHS  -Will consider alternatives on next visit if needed         Relevant Medications    verapamil (CALAN-SR) 120 MG CR tablet       3     Essential hypertension    Overview     Trial of verapamil for headache ppx and HTN          Current Assessment & Plan     -Start verapamil 120 mg qd         Relevant Medications    verapamil (CALAN-SR) 120 MG CR tablet       4     Dry mouth    Current Assessment & Plan     -Sjogren's Abs pending  -May be ESTHER, possibly related to bupropion or baclofen  -Advised slow titration of baclofen as tolerated and monitoring of symptoms         Relevant Orders    ANTI -SSA ANTIBODY    ANTI-SSB ANTIBODY    Dry eye    Current Assessment & Plan     -Sjogren's Abs pending  -May be ESTHER, possibly related to bupropion or baclofen  -Advised slow titration of baclofen as tolerated and monitoring of symptoms         Relevant Orders    ANTI -SSA ANTIBODY    ANTI-SSB ANTIBODY        Niki Cunningham MD  Pager:  [phone] [place] [address]  [phone]

## 2020-01-27 NOTE — PATIENT INSTRUCTIONS
-Plan for FIONA scan, will work to get it approved with insurance   -We'll keep the sinemet (carbidopa-levodopa) at the same dose   -Currently we are concerned for dopa-responsive dystonias versus an atypical parkinsonism  -Would keep your Ophthalmology appointment to evaluate the vision trouble  -Trial of verapamil 120 mg nightly for headaches and dizziness--message me if you feel like you are light-headed or significantly more fatigued with starting this medication.  Probably won't help headaches for ~6-8 wks.

## 2020-01-27 NOTE — LETTER
January 27, 2020      [facility] - Neurology  [address]  [facility] [address]  Phone: [phone]  Fax: [phone]       Patient: Storm Hinds   YOB: 1980  Date of Visit: 01/27/2020    To Whom It May Concern:    Taqueria Hinds  was at [facility] on 01/27/2020. He is not cleared for work at this time due to some continued cognitive and motor symptoms that we are working to address.  Please feel free to contact the office for any other issues needing clarification.  At this time, he is certainly appropriate for continued short-term disability.    Sincerely,    Niki Cunningham MD

## 2020-01-31 PROBLEM — R68.2 DRY MOUTH: Status: ACTIVE | Noted: 2020-01-31

## 2020-01-31 PROBLEM — G43.719 INTRACTABLE CHRONIC MIGRAINE WITHOUT AURA AND WITHOUT STATUS MIGRAINOSUS: Status: ACTIVE | Noted: 2020-01-31

## 2020-01-31 PROBLEM — H04.129 DRY EYE: Status: ACTIVE | Noted: 2020-01-31

## 2020-01-31 PROBLEM — I10 ESSENTIAL HYPERTENSION: Status: ACTIVE | Noted: 2020-01-31

## 2020-01-31 NOTE — ASSESSMENT & PLAN NOTE
-Continue carbidopa-levodopa  mg tid  -Discontinuing trihexyphenidyl 2/2 side effects--check vitamin levels to r/o confounding factors  -Will need FIONA scan to determine dopamine-responsive dystonia vs parkinsonism, outcome of this study is likely to change treatment plan going forward

## 2020-01-31 NOTE — ASSESSMENT & PLAN NOTE
-Sjogren's Abs pending  -May be ESTHER, possibly related to bupropion or baclofen  -Advised slow titration of baclofen as tolerated and monitoring of symptoms

## 2020-02-03 ENCOUNTER — TELEPHONE (OUTPATIENT)
Dept: NEUROLOGY | Facility: CLINIC | Age: 40
End: 2020-02-03

## 2020-02-03 NOTE — TELEPHONE ENCOUNTER
Janey scan authorized by insurance according to pre-service. Orders faxed to [facility].

## 2020-02-04 ENCOUNTER — PATIENT MESSAGE (OUTPATIENT)
Dept: NEUROLOGY | Facility: CLINIC | Age: 40
End: 2020-02-04

## 2020-02-12 LAB
ACID FAST MOD KINY STN SPEC: NORMAL
MYCOBACTERIUM SPEC QL CULT: NORMAL

## 2020-02-14 ENCOUNTER — OFFICE VISIT (OUTPATIENT)
Dept: FAMILY MEDICINE | Facility: CLINIC | Age: 40
End: 2020-02-14
Payer: COMMERCIAL

## 2020-02-14 VITALS
HEART RATE: 94 BPM | RESPIRATION RATE: 16 BRPM | SYSTOLIC BLOOD PRESSURE: 120 MMHG | OXYGEN SATURATION: 98 % | WEIGHT: 203.19 LBS | BODY MASS INDEX: 27.52 KG/M2 | DIASTOLIC BLOOD PRESSURE: 77 MMHG | HEIGHT: 72 IN

## 2020-02-14 DIAGNOSIS — G24.9 DYSTONIA: Primary | ICD-10-CM

## 2020-02-14 DIAGNOSIS — R51.9 NONINTRACTABLE HEADACHE, UNSPECIFIED CHRONICITY PATTERN, UNSPECIFIED HEADACHE TYPE: ICD-10-CM

## 2020-02-14 PROCEDURE — 3008F PR BODY MASS INDEX (BMI) DOCUMENTED: ICD-10-PCS | Mod: S$GLB,,, | Performed by: FAMILY MEDICINE

## 2020-02-14 PROCEDURE — 3074F PR MOST RECENT SYSTOLIC BLOOD PRESSURE < 130 MM HG: ICD-10-PCS | Mod: S$GLB,,, | Performed by: FAMILY MEDICINE

## 2020-02-14 PROCEDURE — 3008F BODY MASS INDEX DOCD: CPT | Mod: S$GLB,,, | Performed by: FAMILY MEDICINE

## 2020-02-14 PROCEDURE — 99214 PR OFFICE/OUTPT VISIT, EST, LEVL IV, 30-39 MIN: ICD-10-PCS | Mod: S$GLB,,, | Performed by: FAMILY MEDICINE

## 2020-02-14 PROCEDURE — 3078F PR MOST RECENT DIASTOLIC BLOOD PRESSURE < 80 MM HG: ICD-10-PCS | Mod: S$GLB,,, | Performed by: FAMILY MEDICINE

## 2020-02-14 PROCEDURE — 3078F DIAST BP <80 MM HG: CPT | Mod: S$GLB,,, | Performed by: FAMILY MEDICINE

## 2020-02-14 PROCEDURE — 3074F SYST BP LT 130 MM HG: CPT | Mod: S$GLB,,, | Performed by: FAMILY MEDICINE

## 2020-02-14 PROCEDURE — 99214 OFFICE O/P EST MOD 30 MIN: CPT | Mod: S$GLB,,, | Performed by: FAMILY MEDICINE

## 2020-02-14 RX ORDER — PROPRANOLOL HYDROCHLORIDE 20 MG/1
20 TABLET ORAL 2 TIMES DAILY
Qty: 60 TABLET | Refills: 11 | Status: SHIPPED | OUTPATIENT
Start: 2020-02-14 | End: 2020-12-10

## 2020-02-14 NOTE — LETTER
February 16, 2020      [facility]  [address] [place] [address]  Phone: [phone]  Fax: [phone]       Patient: Storm Hinds   YOB: 1980  Date of Visit: 12/17/2019    To Whom It May Concern:    Taqueria Hinds  was at [facility] on 2/14/2020 . He may return to work on 03/14/2020 with no restrictions. If you have any questions or concerns, or if I can be of further assistance, please do not hesitate to contact me.    Sincerely,      Elliott Wasserman MD

## 2020-02-15 ENCOUNTER — PATIENT MESSAGE (OUTPATIENT)
Dept: FAMILY MEDICINE | Facility: CLINIC | Age: 40
End: 2020-02-15

## 2020-02-16 NOTE — PROGRESS NOTES
[facility] - Clinic Note    Subjective      Mr. Hinds is a 39 y.o. male who presents to clinic for Follow-up    Patient reports that his dystonia has improved since being on the Sinemet.  He was recently tried on verapamil to help with headaches.  He is following up with Neurology for possibly getting the heide scan.  He has been having trouble with the verapamil as is causing him to feel queasy when he takes it.  He also reports that he has been having issues with temperature regulation.  He reports that if the temperature gets above 75° he feels nauseated and sick.    PM Storm has a past medical history of Acid reflux, Depression, Muscle spasm, Pain, and Tremors of nervous system.   PSXH Storm has no past surgical history on file.    Storm's family history includes Cancer in his father; Hypertension in his mother, paternal grandfather, and paternal grandmother; Stroke in his mother.   SH Storm reports that he has quit smoking. He has quit using smokeless tobacco. He reports that he does not drink alcohol or use drugs.   ALG Storm has No Known Allergies.   MED Storm has a current medication list which includes the following prescription(s): baclofen, bupropion, carbidopa-levodopa  mg, gabapentin, lorazepam, diclofenac sodium, and propranolol.     Review of Systems   Constitutional: Negative for chills and fever.   Musculoskeletal: Positive for myalgias.   Neurological: Positive for headaches.     Objective     /77 (BP Location: Left arm, Patient Position: Sitting, BP Method: X-Large (Automatic))   Pulse 94   Resp 16   Ht 6' (1.829 m)   Wt 92.2 kg (203 lb 3.2 oz)   SpO2 98%   BMI 27.56 kg/m²     Physical Exam   Constitutional: He appears well-developed and well-nourished. No distress.   HENT:   Right Ear: External ear normal.   Left Ear: External ear normal.   Eyes: Right eye exhibits no discharge. Left eye exhibits no discharge.   Cardiovascular: Normal rate, regular rhythm and normal  heart sounds.   Pulmonary/Chest: Effort normal and breath sounds normal. He has no wheezes. He has no rales.   Musculoskeletal:   Gait has improved   Neurological: He is alert.   Skin: Skin is warm and dry.   Psychiatric: He has a normal mood and affect.   Nursing note and vitals reviewed.     Assessment/Plan     1. Dystonia     2. Nonintractable headache, unspecified chronicity pattern, unspecified headache type  propranolol (INDERAL) 20 MG tablet     Dystonias improved but patient still has issues with headaches and temperature regulation.  Will hold verapamil as is causing him side effects and after few days will start propanolol to see if that helps with his headaches.  Unclear etiology of temperature dysregulation.    Follow up in about 4 weeks (around 3/13/2020) for follow up.    No future appointments.      Elliott Wasserman MD  Family Medicine  [facility]

## 2020-02-27 ENCOUNTER — PATIENT MESSAGE (OUTPATIENT)
Dept: NEUROLOGY | Facility: CLINIC | Age: 40
End: 2020-02-27

## 2020-03-13 ENCOUNTER — OFFICE VISIT (OUTPATIENT)
Dept: FAMILY MEDICINE | Facility: CLINIC | Age: 40
End: 2020-03-13
Payer: COMMERCIAL

## 2020-03-13 VITALS
OXYGEN SATURATION: 97 % | RESPIRATION RATE: 15 BRPM | BODY MASS INDEX: 27.4 KG/M2 | SYSTOLIC BLOOD PRESSURE: 123 MMHG | HEART RATE: 80 BPM | WEIGHT: 202.31 LBS | DIASTOLIC BLOOD PRESSURE: 75 MMHG | HEIGHT: 72 IN | TEMPERATURE: 98 F

## 2020-03-13 DIAGNOSIS — F43.21 SITUATIONAL DEPRESSION: ICD-10-CM

## 2020-03-13 DIAGNOSIS — R68.89 HEAT INTOLERANCE: Primary | ICD-10-CM

## 2020-03-13 DIAGNOSIS — M25.539 PAIN OF RADIAL SIDE OF WRIST: ICD-10-CM

## 2020-03-13 DIAGNOSIS — R51.9 NONINTRACTABLE HEADACHE, UNSPECIFIED CHRONICITY PATTERN, UNSPECIFIED HEADACHE TYPE: ICD-10-CM

## 2020-03-13 PROCEDURE — 3078F PR MOST RECENT DIASTOLIC BLOOD PRESSURE < 80 MM HG: ICD-10-PCS | Mod: S$GLB,,, | Performed by: FAMILY MEDICINE

## 2020-03-13 PROCEDURE — 3074F PR MOST RECENT SYSTOLIC BLOOD PRESSURE < 130 MM HG: ICD-10-PCS | Mod: S$GLB,,, | Performed by: FAMILY MEDICINE

## 2020-03-13 PROCEDURE — 3078F DIAST BP <80 MM HG: CPT | Mod: S$GLB,,, | Performed by: FAMILY MEDICINE

## 2020-03-13 PROCEDURE — 3008F PR BODY MASS INDEX (BMI) DOCUMENTED: ICD-10-PCS | Mod: S$GLB,,, | Performed by: FAMILY MEDICINE

## 2020-03-13 PROCEDURE — 99214 OFFICE O/P EST MOD 30 MIN: CPT | Mod: S$GLB,,, | Performed by: FAMILY MEDICINE

## 2020-03-13 PROCEDURE — 3008F BODY MASS INDEX DOCD: CPT | Mod: S$GLB,,, | Performed by: FAMILY MEDICINE

## 2020-03-13 PROCEDURE — 99214 PR OFFICE/OUTPT VISIT, EST, LEVL IV, 30-39 MIN: ICD-10-PCS | Mod: S$GLB,,, | Performed by: FAMILY MEDICINE

## 2020-03-13 PROCEDURE — 3074F SYST BP LT 130 MM HG: CPT | Mod: S$GLB,,, | Performed by: FAMILY MEDICINE

## 2020-03-13 RX ORDER — BUPROPION HYDROCHLORIDE 150 MG/1
300 TABLET ORAL DAILY
Qty: 60 TABLET | Refills: 5 | Status: SHIPPED | OUTPATIENT
Start: 2020-03-13 | End: 2021-02-22

## 2020-03-13 NOTE — LETTER
March 15, 2020      [facility]  [address] [place] [address]  Phone: [phone]  Fax: [phone]       Patient: Storm Hinds   YOB: 1980  Date of Visit: 12/17/2019    To Whom It May Concern:    Taqueria Hinds  was at [facility] on 3/13/2020 . He may return to work on 04/13/2020 with no restrictions. If you have any questions or concerns, or if I can be of further assistance, please do not hesitate to contact me.    Sincerely,      Elliott Wasserman MD

## 2020-03-13 NOTE — LETTER
March 15, 2020      [facility]  [address] [place] [address]  Phone: [phone]  Fax: [phone]       Patient: Storm Hinds   YOB: 1980  Date of Visit: 3/13/2020    To Whom It May Concern:    Taqueria Hinds  was at [facility] on 3/13/2020 . He may return to work on 04/13/2020 with no restrictions. If you have any questions or concerns, or if I can be of further assistance, please do not hesitate to contact me.    Sincerely,      Elliott Wasserman MD

## 2020-03-15 NOTE — PROGRESS NOTES
[facility] - Clinic Note    Subjective      Mr. Hinds is a 39 y.o. male who presents to clinic for Follow-up and Wrist Pain (R wrist)    Patient reports that his headaches improved after starting the propanolol but he is still having this issue with heat intolerance.  He stop taking the propanolol see if that would help but the he had issues still present.  He also reports that he has pain on the radial side of his right wrist.  This is worse at night.  Has a history of a ganglion cyst in that area.    PMH Storm has a past medical history of Acid reflux, Depression, Muscle spasm, Pain, and Tremors of nervous system.   PSXH Storm has no past surgical history on file.    Storm's family history includes Cancer in his father; Hypertension in his mother, paternal grandfather, and paternal grandmother; Stroke in his mother.   SH Storm reports that he has quit smoking. He has quit using smokeless tobacco. He reports that he does not drink alcohol or use drugs.   ALG Storm has No Known Allergies.   MED Storm has a current medication list which includes the following prescription(s): baclofen, bupropion, carbidopa-levodopa  mg, gabapentin, diclofenac sodium, lorazepam, and propranolol.     Review of Systems   Constitutional: Negative for chills and fever.   Endocrine: Positive for heat intolerance.   Musculoskeletal: Positive for arthralgias.   Neurological: Positive for headaches.     Objective     /75 (BP Location: Left arm, Patient Position: Sitting, BP Method: Large (Automatic))   Pulse 80   Temp 98.2 °F (36.8 °C) (Oral)   Resp 15   Ht 6' (1.829 m)   Wt 91.8 kg (202 lb 4.8 oz)   SpO2 97%   BMI 27.44 kg/m²     Physical Exam   Constitutional: He appears well-developed and well-nourished. No distress.   HENT:   Right Ear: External ear normal.   Left Ear: External ear normal.   Eyes: Right eye exhibits no discharge. Left eye exhibits no discharge.   Cardiovascular: Normal rate, regular rhythm  and normal heart sounds.   Pulmonary/Chest: Effort normal and breath sounds normal. He has no wheezes. He has no rales.   Musculoskeletal:        Right wrist: He exhibits tenderness.   Positive Finkelstein's test on the right.   Neurological: He is alert.   Skin: Skin is warm and dry.   Psychiatric: He has a normal mood and affect.   Nursing note and vitals reviewed.     Assessment/Plan     1. Heat intolerance  Metanephrines, Plasma Free    TSH    T4, free   2. Nonintractable headache, unspecified chronicity pattern, unspecified headache type     3. Situational depression  buPROPion (WELLBUTRIN XL) 150 MG TB24 tablet   4. Pain of radial side of wrist  US Extremity Non Vascular Limited Right    SPLINT FOR HOME USE     Given continued heat intolerance will obtain lab work as above.  Given help with headaches will restart propanolol.  Heat intolerance could be related to Wellbutrin.  Will decrease the dose and see if that improves the heat intolerance.  Will obtain ultrasound of the wrist to assess for any abnormalities but with positive Finkelstein's test likely de Quervain tenosynovitis.  Recommended thumb spica splint.  Anti-inflammatory medicine as needed.    Follow up in about 1 month (around 4/13/2020) for follow up.    Future Appointments   Date Time Provider Department Center   3/19/2020  3:30 PM [facility] US2 [facility] US [facility]   4/16/2020  4:40 PM Elliott Wasserman MD [facility] AMALIA Wasserman MD  Family Medicine  [facility]

## 2020-03-16 ENCOUNTER — PATIENT MESSAGE (OUTPATIENT)
Dept: FAMILY MEDICINE | Facility: CLINIC | Age: 40
End: 2020-03-16

## 2020-03-17 ENCOUNTER — PATIENT MESSAGE (OUTPATIENT)
Dept: FAMILY MEDICINE | Facility: CLINIC | Age: 40
End: 2020-03-17

## 2020-03-17 ENCOUNTER — TELEPHONE (OUTPATIENT)
Dept: FAMILY MEDICINE | Facility: CLINIC | Age: 40
End: 2020-03-17

## 2020-03-19 ENCOUNTER — HOSPITAL ENCOUNTER (OUTPATIENT)
Dept: RADIOLOGY | Facility: HOSPITAL | Age: 40
Discharge: HOME | End: 2020-03-19
Attending: FAMILY MEDICINE
Payer: COMMERCIAL

## 2020-03-19 ENCOUNTER — PATIENT MESSAGE (OUTPATIENT)
Dept: FAMILY MEDICINE | Facility: CLINIC | Age: 40
End: 2020-03-19

## 2020-03-19 DIAGNOSIS — M25.539 PAIN OF RADIAL SIDE OF WRIST: ICD-10-CM

## 2020-03-19 PROCEDURE — 76882 US LMTD JT/FCL EVL NVASC XTR: CPT | Mod: TC,PN,RT

## 2020-03-19 PROCEDURE — 76882 US EXTREMITY NON VASCULAR LIMITED RIGHT: ICD-10-PCS | Mod: 26,RT,, | Performed by: RADIOLOGY

## 2020-03-19 PROCEDURE — 76882 US LMTD JT/FCL EVL NVASC XTR: CPT | Mod: 26,RT,, | Performed by: RADIOLOGY

## 2020-03-22 ENCOUNTER — PATIENT MESSAGE (OUTPATIENT)
Dept: FAMILY MEDICINE | Facility: CLINIC | Age: 40
End: 2020-03-22

## 2020-03-23 NOTE — TELEPHONE ENCOUNTER
03/23/2020  4:30 PM----Patient sent portal message about increasing Wellbutrin. Also patient stated he is having more rectal bleeding (FYI 12/12/2019 HgB 13.6)Please advise. Thanks

## 2020-03-25 ENCOUNTER — PATIENT MESSAGE (OUTPATIENT)
Dept: FAMILY MEDICINE | Facility: CLINIC | Age: 40
End: 2020-03-25

## 2020-03-25 DIAGNOSIS — F43.21 SITUATIONAL DEPRESSION: Primary | ICD-10-CM

## 2020-03-25 RX ORDER — HYDROCORTISONE ACETATE 25 MG/1
25 SUPPOSITORY RECTAL 2 TIMES DAILY
Qty: 14 SUPPOSITORY | Refills: 0 | Status: SHIPPED | OUTPATIENT
Start: 2020-03-25 | End: 2020-04-01

## 2020-03-25 RX ORDER — SERTRALINE HYDROCHLORIDE 25 MG/1
50 TABLET, FILM COATED ORAL DAILY
Qty: 60 TABLET | Refills: 11 | Status: SHIPPED | OUTPATIENT
Start: 2020-03-25 | End: 2020-05-07

## 2020-03-26 ENCOUNTER — PATIENT MESSAGE (OUTPATIENT)
Dept: FAMILY MEDICINE | Facility: CLINIC | Age: 40
End: 2020-03-26

## 2020-03-31 ENCOUNTER — PATIENT MESSAGE (OUTPATIENT)
Dept: FAMILY MEDICINE | Facility: CLINIC | Age: 40
End: 2020-03-31

## 2020-03-31 ENCOUNTER — PATIENT OUTREACH (OUTPATIENT)
Dept: ADMINISTRATIVE | Facility: HOSPITAL | Age: 40
End: 2020-03-31

## 2020-04-05 ENCOUNTER — PATIENT MESSAGE (OUTPATIENT)
Dept: FAMILY MEDICINE | Facility: CLINIC | Age: 40
End: 2020-04-05

## 2020-04-06 ENCOUNTER — PATIENT MESSAGE (OUTPATIENT)
Dept: FAMILY MEDICINE | Facility: CLINIC | Age: 40
End: 2020-04-06

## 2020-04-06 RX ORDER — OMEPRAZOLE 20 MG/1
20 CAPSULE, DELAYED RELEASE ORAL DAILY
Qty: 90 CAPSULE | Refills: 3 | Status: SHIPPED | OUTPATIENT
Start: 2020-04-06 | End: 2022-03-31

## 2020-04-10 ENCOUNTER — PATIENT MESSAGE (OUTPATIENT)
Dept: FAMILY MEDICINE | Facility: CLINIC | Age: 40
End: 2020-04-10

## 2020-04-13 ENCOUNTER — PATIENT MESSAGE (OUTPATIENT)
Dept: FAMILY MEDICINE | Facility: CLINIC | Age: 40
End: 2020-04-13

## 2020-04-21 ENCOUNTER — OFFICE VISIT (OUTPATIENT)
Dept: FAMILY MEDICINE | Facility: CLINIC | Age: 40
End: 2020-04-21
Payer: COMMERCIAL

## 2020-04-21 DIAGNOSIS — R68.89 HEAT INTOLERANCE: ICD-10-CM

## 2020-04-21 DIAGNOSIS — M79.10 MUSCLE PAIN: Primary | ICD-10-CM

## 2020-04-21 DIAGNOSIS — F43.21 SITUATIONAL DEPRESSION: ICD-10-CM

## 2020-04-21 PROCEDURE — 99214 OFFICE O/P EST MOD 30 MIN: CPT | Mod: 95,,, | Performed by: FAMILY MEDICINE

## 2020-04-21 PROCEDURE — 99214 PR OFFICE/OUTPT VISIT, EST, LEVL IV, 30-39 MIN: ICD-10-PCS | Mod: 95,,, | Performed by: FAMILY MEDICINE

## 2020-04-21 RX ORDER — MELOXICAM 7.5 MG/1
7.5 TABLET ORAL 2 TIMES DAILY PRN
Qty: 60 TABLET | Refills: 2 | Status: SHIPPED | OUTPATIENT
Start: 2020-04-21 | End: 2020-07-22 | Stop reason: SDUPTHER

## 2020-04-21 NOTE — PROGRESS NOTES
[facility] - Telemedicine Note    Subjective      Mr. Hinds is a 40 y.o. male who presents for Follow-up    The patient location is: home  Visit type: Virtual visit with synchronous audio and video  Total time spent with patient:  16 min  Each patient to whom he or she provides medical services by telemedicine is:  (1) informed of the relationship between the physician and patient and the respective role of any other health care provider with respect to management of the patient; and (2) notified that he or she may decline to receive medical services by telemedicine and may withdraw from such care at any time.    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  He has been doing fine with regard to his movement.  He has it is a he has become more irritable since being off of the Wellbutrin.  His heat intolerance issues have improved.  He is having some muscle pains in his upper extremities.  Previously tolerated Mobic which helped significantly.    PMH Storm has a past medical history of Acid reflux, Depression, Muscle spasm, Pain, and Tremors of nervous system.   PSX Storm has no past surgical history on file.    Storm's family history includes Cancer in his father; Hypertension in his mother, paternal grandfather, and paternal grandmother; Stroke in his mother.   SH Storm reports that he has quit smoking. He has quit using smokeless tobacco. He reports that he does not drink alcohol or use drugs.   ALG Storm has No Known Allergies.   MED Storm has a current medication list which includes the following prescription(s): baclofen, bupropion, carbidopa-levodopa  mg, diclofenac sodium, gabapentin, lorazepam, meloxicam, omeprazole, propranolol, and sertraline.     Review of Systems   Constitutional: Negative for chills and fever.   Respiratory: Negative for cough and shortness of breath.    Endocrine: Negative for heat intolerance.   Musculoskeletal: Positive for myalgias.     Objective      There were no vitals taken for this visit.    Physical Exam   Constitutional: He is oriented to person, place, and time. He appears well-developed and well-nourished. No distress.   HENT:   Head: Normocephalic and atraumatic.   Right Ear: External ear normal.   Left Ear: External ear normal.   Eyes: Right eye exhibits no discharge. Left eye exhibits no discharge.   Pulmonary/Chest: Effort normal.   Neurological: He is alert and oriented to person, place, and time.   Skin: He is not diaphoretic.   Psychiatric: He has a normal mood and affect. His behavior is normal. Judgment and thought content normal.      Assessment/Plan     1. Muscle pain  meloxicam (MOBIC) 7.5 MG tablet   2. Situational depression     3. Heat intolerance       Heat intolerance could have been medication induced secondary to the Wellbutrin but it was providing positive effect on his mood.  Maybe the combination of Zoloft and low-dose Wellbutrin will help with both the heat issues and mood issues.  Will restart Wellbutrin 150 mg daily in the morning and continue Zoloft in the evening.  Mobic as needed for muscle pains.  May have increased because patient has been working more.  Will continue to monitor.    Follow up in about 3 weeks (around 5/12/2020) for Follow-up.    Future Appointments   Date Time Provider Department Center   5/7/2020  4:40 PM Elliott Wasserman MD [facility] [place] [place] AMALIA Wasserman MD  Family Medicine  [facility]

## 2020-05-05 ENCOUNTER — PATIENT MESSAGE (OUTPATIENT)
Dept: ADMINISTRATIVE | Facility: HOSPITAL | Age: 40
End: 2020-05-05

## 2020-05-07 ENCOUNTER — PATIENT MESSAGE (OUTPATIENT)
Dept: NEUROLOGY | Facility: CLINIC | Age: 40
End: 2020-05-07

## 2020-05-07 ENCOUNTER — PATIENT MESSAGE (OUTPATIENT)
Dept: FAMILY MEDICINE | Facility: CLINIC | Age: 40
End: 2020-05-07

## 2020-05-07 ENCOUNTER — OFFICE VISIT (OUTPATIENT)
Dept: FAMILY MEDICINE | Facility: CLINIC | Age: 40
End: 2020-05-07
Payer: COMMERCIAL

## 2020-05-07 DIAGNOSIS — M79.10 MUSCLE PAIN: ICD-10-CM

## 2020-05-07 DIAGNOSIS — R68.89 HEAT INTOLERANCE: ICD-10-CM

## 2020-05-07 DIAGNOSIS — F43.21 SITUATIONAL DEPRESSION: Primary | ICD-10-CM

## 2020-05-07 PROCEDURE — 99214 OFFICE O/P EST MOD 30 MIN: CPT | Mod: 95,,, | Performed by: FAMILY MEDICINE

## 2020-05-07 PROCEDURE — 99214 PR OFFICE/OUTPT VISIT, EST, LEVL IV, 30-39 MIN: ICD-10-PCS | Mod: 95,,, | Performed by: FAMILY MEDICINE

## 2020-05-07 NOTE — LETTER
May 11, 2020      [facility] [address] [place] [address]  Phone: [phone]  Fax: [phone]       Patient: Storm Hinds   YOB: 1980    To Whom It May Concern:    Taqueria Hinds may return to work on 06/7/2020 with no restrictions. We are still working on titrating his medications. If you have any questions or concerns, or if I can be of further assistance, please do not hesitate to contact me.    Sincerely,      Elliott Wasserman MD

## 2020-05-07 NOTE — PROGRESS NOTES
[facility] - Telemedicine Note    Subjective      Mr. Hinds is a 40 y.o. male who presents for Follow-up    The patient location is: home  Visit type: Virtual visit with synchronous audio and video  Total time spent with patient: 22 minutes  Each patient to whom he or she provides medical services by telemedicine is:  (1) informed of the relationship between the physician and patient and the respective role of any other health care provider with respect to management of the patient; and (2) notified that he or she may decline to receive medical services by telemedicine and may withdraw from such care at any time.    Patient reports that the Mobic is helping with his pain.  He still has some trouble with his wrist but is able to manage.  He states that he is still easily agitated on the 150 mg of the Wellbutrin and this was better when he was taking 300 mg of Wellbutrin.  He states that the Zoloft is not helping.  It makes him tired during the daytime.  He has tapered down to 25 mg daily.  He reports that he feels like the heat is becoming more of an issue since starting Wellbutrin again but it is manageable at this time.    PMH Storm has a past medical history of Acid reflux, Depression, Muscle spasm, Pain, and Tremors of nervous system.   PSXH Storm has no past surgical history on file.    Storm's family history includes Cancer in his father; Hypertension in his mother, paternal grandfather, and paternal grandmother; Stroke in his mother.   SH Storm reports that he has quit smoking. He has quit using smokeless tobacco. He reports that he does not drink alcohol or use drugs.   ALG Storm has No Known Allergies.   MED Storm has a current medication list which includes the following prescription(s): baclofen, bupropion, carbidopa-levodopa  mg, diclofenac sodium, gabapentin, meloxicam, omeprazole, and propranolol.     Review of Systems   Constitutional: Negative for chills and fever.   Respiratory: Negative  for cough and shortness of breath.    Endocrine: Negative for heat intolerance.   Musculoskeletal: Negative for myalgias.     Objective     There were no vitals taken for this visit.    Physical Exam   Constitutional: He is oriented to person, place, and time. He appears well-developed and well-nourished. No distress.   HENT:   Head: Normocephalic and atraumatic.   Right Ear: External ear normal.   Left Ear: External ear normal.   Eyes: Right eye exhibits no discharge. Left eye exhibits no discharge.   Pulmonary/Chest: Effort normal.   Neurological: He is alert and oriented to person, place, and time.   Skin: He is not diaphoretic.   Psychiatric: He has a normal mood and affect. His behavior is normal. Judgment and thought content normal.      Assessment/Plan     1. Situational depression     2. Muscle pain     3. Heat intolerance       Will taper off of the Zoloft.  Will try increasing Wellbutrin back to 300 mg after tapering off Zoloft.  If that worsens the heat intolerance we will consider using the sustained release.    Follow up in about 1 month (around 6/7/2020) for follow up.    Future Appointments   Date Time Provider Department Center   6/8/2020  4:20 PM Elliott Wasserman MD [facility]         Elliott Wasserman MD  Family Medicine  [facility]

## 2020-05-13 ENCOUNTER — TELEPHONE (OUTPATIENT)
Dept: NEUROLOGY | Facility: CLINIC | Age: 40
End: 2020-05-13

## 2020-05-20 ENCOUNTER — PATIENT MESSAGE (OUTPATIENT)
Dept: ADMINISTRATIVE | Facility: HOSPITAL | Age: 40
End: 2020-05-20

## 2020-06-08 ENCOUNTER — OFFICE VISIT (OUTPATIENT)
Dept: FAMILY MEDICINE | Facility: CLINIC | Age: 40
End: 2020-06-08
Payer: COMMERCIAL

## 2020-06-08 VITALS
TEMPERATURE: 98 F | BODY MASS INDEX: 27.17 KG/M2 | OXYGEN SATURATION: 97 % | DIASTOLIC BLOOD PRESSURE: 83 MMHG | HEIGHT: 72 IN | WEIGHT: 200.63 LBS | SYSTOLIC BLOOD PRESSURE: 125 MMHG | RESPIRATION RATE: 16 BRPM | HEART RATE: 80 BPM

## 2020-06-08 DIAGNOSIS — R68.89 HEAT INTOLERANCE: ICD-10-CM

## 2020-06-08 DIAGNOSIS — F43.21 SITUATIONAL DEPRESSION: Primary | ICD-10-CM

## 2020-06-08 PROCEDURE — 99214 PR OFFICE/OUTPT VISIT, EST, LEVL IV, 30-39 MIN: ICD-10-PCS | Mod: S$GLB,,, | Performed by: FAMILY MEDICINE

## 2020-06-08 PROCEDURE — 3079F DIAST BP 80-89 MM HG: CPT | Mod: S$GLB,,, | Performed by: FAMILY MEDICINE

## 2020-06-08 PROCEDURE — 3008F BODY MASS INDEX DOCD: CPT | Mod: S$GLB,,, | Performed by: FAMILY MEDICINE

## 2020-06-08 PROCEDURE — 3074F PR MOST RECENT SYSTOLIC BLOOD PRESSURE < 130 MM HG: ICD-10-PCS | Mod: S$GLB,,, | Performed by: FAMILY MEDICINE

## 2020-06-08 PROCEDURE — 3074F SYST BP LT 130 MM HG: CPT | Mod: S$GLB,,, | Performed by: FAMILY MEDICINE

## 2020-06-08 PROCEDURE — 99214 OFFICE O/P EST MOD 30 MIN: CPT | Mod: S$GLB,,, | Performed by: FAMILY MEDICINE

## 2020-06-08 PROCEDURE — 3079F PR MOST RECENT DIASTOLIC BLOOD PRESSURE 80-89 MM HG: ICD-10-PCS | Mod: S$GLB,,, | Performed by: FAMILY MEDICINE

## 2020-06-08 PROCEDURE — 3008F PR BODY MASS INDEX (BMI) DOCUMENTED: ICD-10-PCS | Mod: S$GLB,,, | Performed by: FAMILY MEDICINE

## 2020-06-09 NOTE — PROGRESS NOTES
[facility] - Clinic Note    Subjective      Mr. Hinds is a 40 y.o. male who presents to clinic for Follow-up (regular check up )    Patient reports that since starting the Wellbutrin 300 mg his mood has improved significantly.  He does have more issues with heat intolerance since being on the Wellbutrin which can be directly related.  The heat issues have improved somewhat although and he is not getting as sick when he gets too hot.  He has noticed some drawing up of his left leg when he relaxes.  When he is doing things his leg is fine and he is able to ambulate.    PMH Storm has a past medical history of Acid reflux, Depression, Muscle spasm, Pain, and Tremors of nervous system.   PSXH Storm has no past surgical history on file.   FH Storm's family history includes Cancer in his father; Hypertension in his mother, paternal grandfather, and paternal grandmother; Stroke in his mother.   SH Storm reports that he has quit smoking. He has quit using smokeless tobacco. He reports that he does not drink alcohol or use drugs.   ALG Storm has No Known Allergies.   MED Storm has a current medication list which includes the following prescription(s): baclofen, bupropion, carbidopa-levodopa  mg, gabapentin, meloxicam, omeprazole, propranolol, and diclofenac sodium.     Review of Systems   Constitutional: Negative for chills and fever.   Respiratory: Negative for cough and shortness of breath.    Musculoskeletal: Negative for myalgias.     Objective     /83 (BP Location: Right arm, Patient Position: Sitting, BP Method: X-Large (Automatic))   Pulse 80   Temp 98.3 °F (36.8 °C) (Oral)   Resp 16   Ht 6' (1.829 m)   Wt 91 kg (200 lb 9.6 oz)   SpO2 97%   BMI 27.21 kg/m²     Physical Exam   Constitutional: He is oriented to person, place, and time. He appears well-developed and well-nourished. No distress.   HENT:   Head: Normocephalic and atraumatic.   Right Ear: External ear normal.   Left Ear: External ear  normal.   Eyes: Right eye exhibits no discharge. Left eye exhibits no discharge.   Cardiovascular: Normal rate, regular rhythm and normal heart sounds.   Pulmonary/Chest: Effort normal and breath sounds normal. He has no wheezes. He has no rales.   Neurological: He is alert and oriented to person, place, and time.   Skin: Skin is warm and dry. He is not diaphoretic.   Psychiatric: He has a normal mood and affect. His behavior is normal. Judgment and thought content normal.   Nursing note and vitals reviewed.     Assessment/Plan     1. Situational depression     2. Heat intolerance       Mood symptoms have improved on Wellbutrin.  Will continue.  Hopefully he intolerance issues will improve as time goes on.  Likely directly related to Wellbutrin.  Continue to monitor leg.  May need to see Neurology sooner if symptoms worsen.    I spent 30 min of face-to-face time with patient with greater than 50% of that time spent on discussing patient's disease process.    Follow up in about 1 month (around 7/8/2020) for follow up.    Future Appointments   Date Time Provider Department Center   7/2/2020  1:40 PM David Jackson MD [facility]   7/8/2020  4:20 PM Elliott Wasserman MD [facility]         Elliott Wasserman MD  Family Medicine  [facility]

## 2020-07-01 ENCOUNTER — PATIENT OUTREACH (OUTPATIENT)
Dept: ADMINISTRATIVE | Facility: OTHER | Age: 40
End: 2020-07-01

## 2020-07-02 ENCOUNTER — OFFICE VISIT (OUTPATIENT)
Dept: NEUROLOGY | Facility: CLINIC | Age: 40
End: 2020-07-02
Payer: COMMERCIAL

## 2020-07-02 ENCOUNTER — TELEPHONE (OUTPATIENT)
Dept: NEUROLOGY | Facility: CLINIC | Age: 40
End: 2020-07-02

## 2020-07-02 DIAGNOSIS — G20.A1 PARKINSONS DISEASE: Primary | ICD-10-CM

## 2020-07-02 DIAGNOSIS — G24.9 DYSTONIA: Primary | ICD-10-CM

## 2020-07-02 PROCEDURE — 99214 PR OFFICE/OUTPT VISIT, EST, LEVL IV, 30-39 MIN: ICD-10-PCS | Mod: 95,,, | Performed by: PSYCHIATRY & NEUROLOGY

## 2020-07-02 PROCEDURE — 99499 NO LOS: ICD-10-PCS | Mod: 95,,, | Performed by: PSYCHIATRY & NEUROLOGY

## 2020-07-02 PROCEDURE — 99214 OFFICE O/P EST MOD 30 MIN: CPT | Mod: 95,,, | Performed by: PSYCHIATRY & NEUROLOGY

## 2020-07-02 PROCEDURE — 99499 UNLISTED E&M SERVICE: CPT | Mod: 95,,, | Performed by: PSYCHIATRY & NEUROLOGY

## 2020-07-02 NOTE — PROGRESS NOTES
"Neurology Telemedicine Note     Name: Storm Hinds  MRN: [id number]   CSN: [id number]      Date: 07/02/2020    The patient location is: Work, in [place]  The chief complaint leading to consultation is:   Visit type: Virtual visit with synchronous audio and video    TOTAL TIME SPENT: 3:21p - 3:40    Each patient to whom I provide medical services by telemedicine is:  (1) informed of the relationship between the physician and patient and the respective role of any other health care provider with respect to management of the patient; and (2) notified that they may decline to receive medical services by telemedicine and may withdraw from such care at any time.    History of Present Illness (HPI):   - seen 6+ months ago with me, last seen Dr. Cunningham with Dr. Russell and had made some med changes with good results  - question of whether he was responding best to Baclofen or CD/LD  - weaned down, and when he dropped both - he "locked up and wasn't moving"  - then he added the CD/LD back by himself alone, and felt he was better again  - now he is taking cd/ld, wellbutrin, and baclofen  - more pain, he is taking meloxicam   - was doing great for several months, now is slipping again  - memory is also becoming affected, forgetting simple tasks in the short term  -  -  - says his short term disability ran out    Nonmotor/Premotor ROS: as per HPI, and all other systems are negative    Past Medical History: The patient  has a past medical history of Acid reflux, Depression, Muscle spasm, Pain, and Tremors of nervous system.    Social History: The patient  reports that he has quit smoking. He has quit using smokeless tobacco. He reports that he does not drink alcohol or use drugs.    Family History: Their family history includes Cancer in his father; Hypertension in his mother, paternal grandfather, and paternal grandmother; Stroke in his mother.    Allergies: Patient has no known allergies.     Meds:   Current Outpatient Medications " on File Prior to Visit   Medication Sig Dispense Refill    baclofen (LIORESAL) 20 MG tablet Take 1 tablet (20 mg total) by mouth 3 (three) times daily. 90 tablet 11    buPROPion (WELLBUTRIN XL) 150 MG TB24 tablet Take 2 tablets (300 mg total) by mouth once daily. DO NOT CRUSH OR CHEW; SWALLOW WHOLE. 60 tablet 5    carbidopa-levodopa  mg (SINEMET)  mg per tablet Take 1 tablet by mouth 3 (three) times daily. 90 tablet 11    diclofenac sodium (VOLTAREN) 1 % Gel Apply 2 g topically 3 (three) times daily as needed. 100 g 1    gabapentin (NEURONTIN) 300 MG capsule TAKE 1 CAPSULE BY MOUTH ONCE DAILY IN THE EVENING 90 capsule 1    meloxicam (MOBIC) 7.5 MG tablet Take 1 tablet (7.5 mg total) by mouth 2 (two) times daily as needed for Pain. 60 tablet 2    omeprazole (PRILOSEC) 20 MG capsule Take 1 capsule (20 mg total) by mouth once daily. 90 capsule 3    propranolol (INDERAL) 20 MG tablet Take 1 tablet (20 mg total) by mouth 2 (two) times daily. 60 tablet 11     No current facility-administered medications on file prior to visit.        Exam:  Vital Signs deferred with home visit - EXAM DEFERRED      Medical Record Review:  - Lab Results:  No visits with results within 3 Month(s) from this visit.   Latest known visit with results is:   Lab Visit on 2020   Component Date Value Ref Range Status    TSH 2020 0.836  0.340 - 5.600 uIU/mL Final    Free T4 2020 0.66* 0.71 - 1.51 ng/dL Final       Diagnoses:   1) POSSIBLE DRD.  Has diurnal fluctuations.  2)    Medical Decision Makin) gene test  2) boost the cd/ld tO 1.5 tabs TID  3) ORDER FORMAL NP TESTING FOR PROGNOSIS          David Jackson MD, MPH  Division of Movement and Memory Disorders  [facility]  [phone]

## 2020-07-02 NOTE — PROGRESS NOTES
Requested updates within Care Everywhere.  Patient's chart was reviewed for overdue ROWAN topics.  Immunizations reconciled.

## 2020-07-02 NOTE — TELEPHONE ENCOUNTER
----- Message from Larry Marcelo sent at 7/2/2020  2:22 PM CDT -----  Contact: Patient  Pt called and said that he had a virtual visit at 1:40 and no one called     Pt can be reached at [phone]

## 2020-07-08 ENCOUNTER — OFFICE VISIT (OUTPATIENT)
Dept: FAMILY MEDICINE | Facility: CLINIC | Age: 40
End: 2020-07-08
Payer: COMMERCIAL

## 2020-07-08 VITALS
DIASTOLIC BLOOD PRESSURE: 72 MMHG | OXYGEN SATURATION: 97 % | WEIGHT: 200.81 LBS | HEART RATE: 73 BPM | SYSTOLIC BLOOD PRESSURE: 116 MMHG | RESPIRATION RATE: 15 BRPM | HEIGHT: 72 IN | TEMPERATURE: 99 F | BODY MASS INDEX: 27.2 KG/M2

## 2020-07-08 DIAGNOSIS — G24.9 DYSTONIA: Primary | ICD-10-CM

## 2020-07-08 DIAGNOSIS — F43.21 SITUATIONAL DEPRESSION: ICD-10-CM

## 2020-07-08 DIAGNOSIS — R68.89 HEAT INTOLERANCE: ICD-10-CM

## 2020-07-08 PROCEDURE — 3074F SYST BP LT 130 MM HG: CPT | Mod: S$GLB,,, | Performed by: FAMILY MEDICINE

## 2020-07-08 PROCEDURE — 3078F DIAST BP <80 MM HG: CPT | Mod: S$GLB,,, | Performed by: FAMILY MEDICINE

## 2020-07-08 PROCEDURE — 3078F PR MOST RECENT DIASTOLIC BLOOD PRESSURE < 80 MM HG: ICD-10-PCS | Mod: S$GLB,,, | Performed by: FAMILY MEDICINE

## 2020-07-08 PROCEDURE — 99213 OFFICE O/P EST LOW 20 MIN: CPT | Mod: S$GLB,,, | Performed by: FAMILY MEDICINE

## 2020-07-08 PROCEDURE — 3008F PR BODY MASS INDEX (BMI) DOCUMENTED: ICD-10-PCS | Mod: S$GLB,,, | Performed by: FAMILY MEDICINE

## 2020-07-08 PROCEDURE — 99213 PR OFFICE/OUTPT VISIT, EST, LEVL III, 20-29 MIN: ICD-10-PCS | Mod: S$GLB,,, | Performed by: FAMILY MEDICINE

## 2020-07-08 PROCEDURE — 3074F PR MOST RECENT SYSTOLIC BLOOD PRESSURE < 130 MM HG: ICD-10-PCS | Mod: S$GLB,,, | Performed by: FAMILY MEDICINE

## 2020-07-08 PROCEDURE — 3008F BODY MASS INDEX DOCD: CPT | Mod: S$GLB,,, | Performed by: FAMILY MEDICINE

## 2020-07-12 NOTE — PROGRESS NOTES
[facility] - Clinic Note    Subjective      Mr. Hinds is a 40 y.o. male who presents to clinic for Follow-up    Patient followed up with Dr. Jackson.  He recommended increasing the carbidopa levodopa which has been helping with his symptoms.  He is able to move better in the morning and does not hurt as much.  He wants to undergo some more testing, gene testing.  He has noticed that he has become more sensitive to certain things that he was not sensitive before.  He intolerance is still there but better.  The Wellbutrin is working well for his mood.    PMH Storm has a past medical history of Acid reflux, Depression, Muscle spasm, Pain, and Tremors of nervous system.   PSXH Storm has no past surgical history on file.   FH Storm's family history includes Cancer in his father; Hypertension in his mother, paternal grandfather, and paternal grandmother; Stroke in his mother.   SH Storm reports that he has quit smoking. He has quit using smokeless tobacco. He reports that he does not drink alcohol or use drugs.   ALG Storm has No Known Allergies.   MED Storm has a current medication list which includes the following prescription(s): baclofen, bupropion, carbidopa-levodopa  mg, diclofenac sodium, gabapentin, meloxicam, omeprazole, and propranolol.     Review of Systems   Constitutional: Negative for chills and fever.   Respiratory: Negative for cough and shortness of breath.    Musculoskeletal: Negative for myalgias.     Objective     /72 (BP Location: Left arm, Patient Position: Sitting, BP Method: Large (Automatic))   Pulse 73   Temp 98.7 °F (37.1 °C) (Temporal)   Resp 15   Ht 6' (1.829 m)   Wt 91.1 kg (200 lb 12.8 oz)   SpO2 97%   BMI 27.23 kg/m²     Physical Exam  Vitals signs and nursing note reviewed.   Constitutional:       General: He is not in acute distress.     Appearance: He is well-developed. He is not diaphoretic.   HENT:      Head: Normocephalic and atraumatic.      Right Ear:  External ear normal.      Left Ear: External ear normal.   Eyes:      General:         Right eye: No discharge.         Left eye: No discharge.   Cardiovascular:      Rate and Rhythm: Normal rate and regular rhythm.      Heart sounds: Normal heart sounds.   Pulmonary:      Effort: Pulmonary effort is normal.      Breath sounds: Normal breath sounds. No wheezing or rales.   Skin:     General: Skin is warm and dry.   Neurological:      Mental Status: He is alert and oriented to person, place, and time.   Psychiatric:         Behavior: Behavior normal.         Thought Content: Thought content normal.         Judgment: Judgment normal.        Assessment/Plan     1. Dystonia     2. Situational depression     3. Heat intolerance       Current medication regimen seems to be working well.  Will continue for now.  Will continue to follow along.    Future Appointments   Date Time Provider Department Center   8/12/2020  4:40 PM Elliott Wasserman MD [facility]         Elliott Wasserman MD  Family Medicine  [facility]

## 2020-07-20 ENCOUNTER — OFFICE VISIT (OUTPATIENT)
Dept: NEUROLOGY | Facility: CLINIC | Age: 40
End: 2020-07-20
Payer: COMMERCIAL

## 2020-07-20 DIAGNOSIS — G43.719 INTRACTABLE CHRONIC MIGRAINE WITHOUT AURA AND WITHOUT STATUS MIGRAINOSUS: ICD-10-CM

## 2020-07-20 DIAGNOSIS — F43.23 ADJUSTMENT DISORDER WITH MIXED ANXIETY AND DEPRESSED MOOD: Primary | ICD-10-CM

## 2020-07-20 DIAGNOSIS — R25.9 ABNORMAL INVOLUNTARY MOVEMENTS: ICD-10-CM

## 2020-07-20 DIAGNOSIS — R41.3 MEMORY LOSS: ICD-10-CM

## 2020-07-20 DIAGNOSIS — G24.9 DYSTONIA: ICD-10-CM

## 2020-07-20 PROCEDURE — 99499 UNLISTED E&M SERVICE: CPT | Mod: 95,,, | Performed by: CLINICAL NEUROPSYCHOLOGIST

## 2020-07-20 PROCEDURE — 99499 NO LOS: ICD-10-PCS | Mod: 95,,, | Performed by: CLINICAL NEUROPSYCHOLOGIST

## 2020-07-20 PROCEDURE — 90791 PSYCH DIAGNOSTIC EVALUATION: CPT | Mod: 95,,, | Performed by: CLINICAL NEUROPSYCHOLOGIST

## 2020-07-20 PROCEDURE — 90791 PR PSYCHIATRIC DIAGNOSTIC EVALUATION: ICD-10-PCS | Mod: 95,,, | Performed by: CLINICAL NEUROPSYCHOLOGIST
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
## 2020-07-21 PROBLEM — F32.A DEPRESSION: Status: RESOLVED | Noted: 2020-07-21 | Resolved: 2020-07-21

## 2020-07-21 PROBLEM — F43.22 ADJUSTMENT DISORDER WITH ANXIETY: Status: ACTIVE | Noted: 2020-07-21

## 2020-07-21 PROBLEM — F43.23 ADJUSTMENT DISORDER WITH MIXED ANXIETY AND DEPRESSED MOOD: Status: ACTIVE | Noted: 2020-07-21

## 2020-07-21 PROBLEM — R41.3 MEMORY LOSS: Status: ACTIVE | Noted: 2020-07-21

## 2020-07-21 PROBLEM — F32.A DEPRESSION: Status: ACTIVE | Noted: 2020-07-21

## 2020-07-22 DIAGNOSIS — M79.10 MUSCLE PAIN: ICD-10-CM

## 2020-07-23 RX ORDER — MELOXICAM 7.5 MG/1
7.5 TABLET ORAL 2 TIMES DAILY PRN
Qty: 60 TABLET | Refills: 2 | Status: SHIPPED | OUTPATIENT
Start: 2020-07-23 | End: 2020-10-28

## 2020-08-03 ENCOUNTER — PATIENT MESSAGE (OUTPATIENT)
Dept: FAMILY MEDICINE | Facility: CLINIC | Age: 40
End: 2020-08-03

## 2020-08-03 DIAGNOSIS — G24.9 DYSTONIA: Primary | ICD-10-CM

## 2020-08-03 RX ORDER — CARBIDOPA AND LEVODOPA 25; 100 MG/1; MG/1
1.5 TABLET ORAL 3 TIMES DAILY
Qty: 135 TABLET | Refills: 11 | Status: SHIPPED | OUTPATIENT
Start: 2020-08-03 | End: 2021-08-08 | Stop reason: SDUPTHER

## 2020-08-10 ENCOUNTER — TELEPHONE (OUTPATIENT)
Dept: NEUROLOGY | Facility: CLINIC | Age: 40
End: 2020-08-10

## 2020-08-11 ENCOUNTER — INITIAL CONSULT (OUTPATIENT)
Dept: NEUROLOGY | Facility: CLINIC | Age: 40
End: 2020-08-11
Payer: COMMERCIAL

## 2020-08-11 DIAGNOSIS — F41.9 ANXIETY: ICD-10-CM

## 2020-08-11 DIAGNOSIS — R41.3 MEMORY LOSS: ICD-10-CM

## 2020-08-11 DIAGNOSIS — G43.719 INTRACTABLE CHRONIC MIGRAINE WITHOUT AURA AND WITHOUT STATUS MIGRAINOSUS: ICD-10-CM

## 2020-08-11 DIAGNOSIS — G24.9 DYSTONIA: ICD-10-CM

## 2020-08-11 DIAGNOSIS — F32.1 CURRENT MODERATE EPISODE OF MAJOR DEPRESSIVE DISORDER WITHOUT PRIOR EPISODE: Primary | ICD-10-CM

## 2020-08-11 PROCEDURE — 99999 PR PBB SHADOW E&M-EST. PATIENT-LVL II: CPT | Mod: PBBFAC,,, | Performed by: CLINICAL NEUROPSYCHOLOGIST

## 2020-08-11 PROCEDURE — 96138 PSYCL/NRPSYC TECH 1ST: CPT | Mod: S$GLB,,, | Performed by: CLINICAL NEUROPSYCHOLOGIST

## 2020-08-11 PROCEDURE — 96138 PR PSYCH/NEUROPSYCH TEST ADMIN/SCORING, BY TECH, 2+ TESTS, 1ST 30 MIN: ICD-10-PCS | Mod: S$GLB,,, | Performed by: CLINICAL NEUROPSYCHOLOGIST

## 2020-08-11 PROCEDURE — 96133 PR NEUROPSYCHOLOGIC TEST EVAL SVCS, EA ADDTL HR: ICD-10-PCS | Mod: S$GLB,,, | Performed by: CLINICAL NEUROPSYCHOLOGIST

## 2020-08-11 PROCEDURE — 99499 UNLISTED E&M SERVICE: CPT | Mod: S$GLB,,, | Performed by: CLINICAL NEUROPSYCHOLOGIST

## 2020-08-11 PROCEDURE — 96132 NRPSYC TST EVAL PHYS/QHP 1ST: CPT | Mod: S$GLB,,, | Performed by: CLINICAL NEUROPSYCHOLOGIST

## 2020-08-11 PROCEDURE — 96132 PR NEUROPSYCHOLOGIC TEST EVAL SVCS, 1ST HR: ICD-10-PCS | Mod: S$GLB,,, | Performed by: CLINICAL NEUROPSYCHOLOGIST

## 2020-08-11 PROCEDURE — 99499 NO LOS: ICD-10-PCS | Mod: S$GLB,,, | Performed by: CLINICAL NEUROPSYCHOLOGIST

## 2020-08-11 PROCEDURE — 96133 NRPSYC TST EVAL PHYS/QHP EA: CPT | Mod: S$GLB,,, | Performed by: CLINICAL NEUROPSYCHOLOGIST

## 2020-08-11 PROCEDURE — 96139 PR PSYCH/NEUROPSYCH TEST ADMIN/SCORING, BY TECH, 2+ TESTS, EA ADDTL 30 MIN: ICD-10-PCS | Mod: S$GLB,,, | Performed by: CLINICAL NEUROPSYCHOLOGIST

## 2020-08-11 PROCEDURE — 96139 PSYCL/NRPSYC TST TECH EA: CPT | Mod: S$GLB,,, | Performed by: CLINICAL NEUROPSYCHOLOGIST

## 2020-08-11 PROCEDURE — 99999 PR PBB SHADOW E&M-EST. PATIENT-LVL II: ICD-10-PCS | Mod: PBBFAC,,, | Performed by: CLINICAL NEUROPSYCHOLOGIST

## 2020-08-12 ENCOUNTER — OFFICE VISIT (OUTPATIENT)
Dept: FAMILY MEDICINE | Facility: CLINIC | Age: 40
End: 2020-08-12
Payer: COMMERCIAL

## 2020-08-12 VITALS
HEART RATE: 63 BPM | DIASTOLIC BLOOD PRESSURE: 79 MMHG | TEMPERATURE: 98 F | OXYGEN SATURATION: 98 % | SYSTOLIC BLOOD PRESSURE: 125 MMHG | HEIGHT: 72 IN | WEIGHT: 198.63 LBS | BODY MASS INDEX: 26.9 KG/M2 | RESPIRATION RATE: 15 BRPM

## 2020-08-12 DIAGNOSIS — G24.9 DYSTONIA: Primary | ICD-10-CM

## 2020-08-12 DIAGNOSIS — F43.21 SITUATIONAL DEPRESSION: ICD-10-CM

## 2020-08-12 PROCEDURE — 3008F PR BODY MASS INDEX (BMI) DOCUMENTED: ICD-10-PCS | Mod: S$GLB,,, | Performed by: FAMILY MEDICINE

## 2020-08-12 PROCEDURE — 3074F PR MOST RECENT SYSTOLIC BLOOD PRESSURE < 130 MM HG: ICD-10-PCS | Mod: S$GLB,,, | Performed by: FAMILY MEDICINE

## 2020-08-12 PROCEDURE — 3008F BODY MASS INDEX DOCD: CPT | Mod: S$GLB,,, | Performed by: FAMILY MEDICINE

## 2020-08-12 PROCEDURE — 99214 OFFICE O/P EST MOD 30 MIN: CPT | Mod: S$GLB,,, | Performed by: FAMILY MEDICINE

## 2020-08-12 PROCEDURE — 3074F SYST BP LT 130 MM HG: CPT | Mod: S$GLB,,, | Performed by: FAMILY MEDICINE

## 2020-08-12 PROCEDURE — 3078F PR MOST RECENT DIASTOLIC BLOOD PRESSURE < 80 MM HG: ICD-10-PCS | Mod: S$GLB,,, | Performed by: FAMILY MEDICINE

## 2020-08-12 PROCEDURE — 3078F DIAST BP <80 MM HG: CPT | Mod: S$GLB,,, | Performed by: FAMILY MEDICINE

## 2020-08-12 PROCEDURE — 99214 PR OFFICE/OUTPT VISIT, EST, LEVL IV, 30-39 MIN: ICD-10-PCS | Mod: S$GLB,,, | Performed by: FAMILY MEDICINE

## 2020-08-15 NOTE — PROGRESS NOTES
[facility] - Clinic Note    Subjective      Mr. Hinds is a 40 y.o. male who presents to clinic for Follow-up    Patient reports that since the increase of the carbidopa left it open his symptoms have improved.  He is able to move better.  He he feels intermittently disconnected from his body and has issues with memory.  He is undergoing neuropsychiatric testing.  He is awaiting the results of this.    PMH Storm has a past medical history of Acid reflux, Depression, Muscle spasm, Pain, and Tremors of nervous system.   PSXH Storm has no past surgical history on file.   FH Storm's family history includes Cancer in his father; Hypertension in his mother, paternal grandfather, and paternal grandmother; Stroke in his mother.   SH Storm reports that he has quit smoking. He has quit using smokeless tobacco. He reports that he does not drink alcohol or use drugs.   ALG Storm has No Known Allergies.   MED Storm has a current medication list which includes the following prescription(s): baclofen, bupropion, carbidopa-levodopa  mg, gabapentin, meloxicam, omeprazole, propranolol, and diclofenac sodium.     Review of Systems   Constitutional: Negative for chills and fever.   Respiratory: Negative for cough and shortness of breath.    Musculoskeletal: Negative for myalgias.     Objective     /79 (BP Location: Left arm, Patient Position: Sitting, BP Method: Large (Automatic))   Pulse 63   Temp 97.9 °F (36.6 °C) (Temporal)   Resp 15   Ht 6' (1.829 m)   Wt 90.1 kg (198 lb 9.6 oz)   SpO2 98%   BMI 26.94 kg/m²     Physical Exam  Vitals signs and nursing note reviewed.   Constitutional:       General: He is not in acute distress.     Appearance: He is well-developed. He is not diaphoretic.   HENT:      Head: Normocephalic and atraumatic.      Right Ear: External ear normal.      Left Ear: External ear normal.   Eyes:      General:         Right eye: No discharge.         Left eye: No discharge.   Cardiovascular:       Rate and Rhythm: Normal rate and regular rhythm.      Heart sounds: Normal heart sounds.   Pulmonary:      Effort: Pulmonary effort is normal.      Breath sounds: Normal breath sounds. No wheezing or rales.   Skin:     General: Skin is warm and dry.   Neurological:      Mental Status: He is alert and oriented to person, place, and time.   Psychiatric:         Behavior: Behavior normal.         Thought Content: Thought content normal.         Judgment: Judgment normal.        Assessment/Plan     1. Dystonia     2. Situational depression       Current medication regimen appears to be working well.  Will follow-up results of the neuropsychiatric testing.    Future Appointments   Date Time Provider Department Center   8/21/2020  1:00 PM Yessy Chavez, PhD [facility] NEURPSY [facility]         Elliott Wasserman MD  Family Medicine  [facility] [place]

## 2020-08-17 PROBLEM — F32.1 CURRENT MODERATE EPISODE OF MAJOR DEPRESSIVE DISORDER: Status: ACTIVE | Noted: 2020-07-21

## 2020-08-17 PROBLEM — F41.9 ANXIETY: Status: ACTIVE | Noted: 2020-08-17

## 2020-08-17 NOTE — ASSESSMENT & PLAN NOTE
When results of the current evaluation are compared to average range premorbid estimates (based on both demographic information and word reading performance), the patient performed at or near expectation in almost every domain assessed. His processing speed was the only domain that was consistently lower than expected (most scores falling between one and one and a half standard deviations below his baseline). His speeded verbal fluency was also low as a result. His copy of a complex figure was imprecise but drawn accurately. His multitasking/divided attention score was also slower than expected with one error made. Psychologically, the patient reported a moderate degree of clinically significant anxiety and depression on screening questionnaires. He has also awoken from his sleep feeling a sense of panic.     Overall, this patient does not meet criteria for a neurocognitive disorder diagnosis. His slow processing speed can be explained by his baseline ADD plus moderate psychological distress and intractable migraines. With adequate treatment of psychological distress, I believe this patient will see improvement in his thinking and daily functioning. However, if after adequate treatment, he finds he is continuing to experience cognitive issues, he is welcome to return for re-evaluation.     The following recommendations are made:     Mood Management - This patient would likely significantly benefit from attending psychotherapy/counseling to help address and reduce his symptoms of depression and anxiety. If interested, referrals can be placed to [facility]. Community referrals can also be provided if interested. In addition, I believe this patient would benefit from having psychiatric medications managed by a specialist such as a psychiatrist or medical psychologist. Referrals can be placed for this as well if the patient is interested.     Behaviors that Promote Brain Health - This patient is encouraged to  participate in behaviors known to promote brain health, including maintaining a regular physical exercise regimen, eating a healthy diet such as the Mediterranean/ MIND diet, maintaining both cognitive and social activities, and getting good, quality sleep.    Cognitive Tips and Strategies - The following tips and strategies are provided to help assist in daily activities:      Attention: Remember that inattention and lack of focus are major culprits to forgetting information so be sure and practice paying attention for adequate learning of information. If you rely on passive attention to remembering something (e.g., yeah, uh-huh approach), youll find you cannot recall it later. I recommend the following to improve attention, which may aid in later recall:  1. Reduce distractions in the area as much as possible  2. Look at the person as they are speaking to you.   3. Paraphrase as they are speaking  4. Write down important pieces of information   5. Ask them to repeat if you zone out.  6. Have them simplify and reduce information that you need to attend to during conversation.  7. Have visual cues to remind you if you need to do something later.     Processing Speed:  1. Using multiple modalities (e.g., listening, writing notes, asking questions, recording) to learn new information is likely to allow additional time for processing, thus improving memory for the material.   2. Allowing sufficient time to complete tasks will reduce frustration and help to ensure completion.     Executive Functionin. Dont attempt to multi-task.  Separate tasks so that each can be completed one at a time  2. Consider using a calendar/day planner, as that may be effective to help you plan and stay on track.  Color-coding specific tasks by importance may add additional benefit to your planner  3. Break down large projects into smaller tasks and write down the steps to completing the task.  Taking notes while reading can help  with recall.     Storing Information: Use the below strategies to help you further enhance how information is stored  1. Rehearse - Immediately after seeing/hearing something, try to recall it.  Wait a few minutes, then check again.  Gradually lengthen the intervals between rehearsals.  2. Repetition of learned material is critical to ensure storage of information to be learned. Self-test at home to ensure learning.  3. Write down important information to improve your attention and focus and to have something to look back on when you need to recall it.  4. Make sure the person doesnt rattle off, but presents in a clear, logical, and unhurried manner.      Recalling Information:  1. Jog your memory - Lose something?  Think back to when you last had it.  What did you do next?  And after that?  Mentally walk yourself through each activity that followed.  Prodding your memory this way may enable you to recall the location of the missing item.  2. Use a cue - Symbolic reminders (the proverbial string around the finger) are helpful.  So too are memos, timers, calendar notes, etc.--keep them in visible, appropriate place  3. Get organized - Have fixed locations for all important papers, key phone numbers, medications, keys, wallet, glasses, tools, etc.  4. Develop routines - Routines can anchor memories so they do not drift away.        Re-evaluation as needed. I believe this patient will see improvement in his thinking and daily functioning. However, if after adequate treatment, he finds he is continuing to experience cognitive issues, he is welcome to return for re-evaluation. This evaluation can serve as a baseline for comparison.

## 2020-08-17 NOTE — PROGRESS NOTES
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 billed)   Neurobehavioral status exam by physician [id number] (previously billed)   Each additional hour by physician [id number] (previously billed)   Test Evaluation Services --  --   Neuropsychological testing evaluation services by physician [id number] [phone]   Each additional hour by physician [id number]  2   Test Administration and Scoring --  --   Psychological or neuropsychological test administration and scoring by physician [id number]  0   Each additional 30 minutes by physician [id number]  0   Psychological or neuropsychological test administration and scoring by technician [id number]   Each additional 30 minutes by technician [id number]  5

## 2020-08-21 ENCOUNTER — OFFICE VISIT (OUTPATIENT)
Dept: NEUROLOGY | Facility: CLINIC | Age: 40
End: 2020-08-21
Payer: COMMERCIAL

## 2020-08-21 DIAGNOSIS — F41.9 ANXIETY: ICD-10-CM

## 2020-08-21 DIAGNOSIS — R41.3 MEMORY LOSS: ICD-10-CM

## 2020-08-21 DIAGNOSIS — F32.1 CURRENT MODERATE EPISODE OF MAJOR DEPRESSIVE DISORDER WITHOUT PRIOR EPISODE: ICD-10-CM

## 2020-08-21 DIAGNOSIS — G24.9 DYSTONIA: ICD-10-CM

## 2020-08-21 PROCEDURE — 99499 UNLISTED E&M SERVICE: CPT | Mod: GT,95,, | Performed by: CLINICAL NEUROPSYCHOLOGIST

## 2020-08-21 PROCEDURE — 99499 NO LOS: ICD-10-PCS | Mod: 95,,, | Performed by: CLINICAL NEUROPSYCHOLOGIST

## 2020-08-21 NOTE — PROGRESS NOTES
NEUROPSYCHOLOGICAL EVALUATION FEEDBACK    TELEMEDICINE DETAILS:   The patient location is: home  The chief complaint leading to consultation is: feedback regarding neuropsychological test results  Visit type: Virtual visit with synchronous audio and video  Total time spent with patient: 45 minutes  Each patient to whom he or she provides medical services by telemedicine is: (1) informed of the relationship between the physician and patient and the respective role of any other health care provider with respect to management of the patient; and (2) notified that he or she may decline to receive medical services by telemedicine and may withdraw from such care at any time.  Notes: See below.    Storm Hinds attended a feedback session today and was accompanied by his wife.  We discussed the results of the neuropsychological evaluation.  I provided Mr. Hinds with a copy of the evaluation report as well as some handouts and gave time to discuss questions and concerns.    Yessy Chavez, PhD  Licensed Clinical Neuropsychologist  [facility] - Department of Neurology

## 2020-10-21 ENCOUNTER — PATIENT MESSAGE (OUTPATIENT)
Dept: FAMILY MEDICINE | Facility: CLINIC | Age: 40
End: 2020-10-21

## 2020-10-22 ENCOUNTER — TELEPHONE (OUTPATIENT)
Dept: FAMILY MEDICINE | Facility: CLINIC | Age: 40
End: 2020-10-22

## 2020-10-22 DIAGNOSIS — R20.0 RIGHT LEG NUMBNESS: ICD-10-CM

## 2020-10-22 DIAGNOSIS — G24.9 DYSTONIA: Primary | ICD-10-CM

## 2020-10-22 DIAGNOSIS — R20.0 LEFT ARM NUMBNESS: ICD-10-CM

## 2020-10-22 NOTE — TELEPHONE ENCOUNTER
Chart Reviewed for Duplicate Request  Yes     RX Requested:Ativan    Rx Strength:1 mg    Refill or New:Refill    30 day or 90 day:30 day    Preferred Pharmacy:[facility]    Last Office Visit / Provider:08/12/2020 Lisy    Next Office Visit / Provider:pt states no insurance unable to make another at this time.    Additional Information:pt reporting  job lost, no insurance.  Apply for COBRA. Pt giving the [employer] [facility] no.last refill on the Ativan 12/13/2019

## 2020-10-22 NOTE — TELEPHONE ENCOUNTER
Chart Reviewed for Duplicate Request:yes    RX Requested:gabapentin     Rx Strength:300mg    Refill or New:refill    30 day or 90 day:90 day    Preferred [facility]acy[facility]    Last Office Visit / Provider:Diony    Next Office Visit / Provider: 8/12/2020 andrew    Additional Information:pt states he has no insurance

## 2020-10-23 RX ORDER — GABAPENTIN 300 MG/1
300 CAPSULE ORAL NIGHTLY
Qty: 90 CAPSULE | Refills: 1 | Status: SHIPPED | OUTPATIENT
Start: 2020-10-23 | End: 2021-10-26

## 2020-10-23 RX ORDER — LORAZEPAM 1 MG/1
1 TABLET ORAL EVERY 12 HOURS PRN
Qty: 60 TABLET | Refills: 0 | Status: SHIPPED | OUTPATIENT
Start: 2020-10-23 | End: 2020-12-10 | Stop reason: ALTCHOICE

## 2020-12-10 ENCOUNTER — TELEPHONE (OUTPATIENT)
Dept: FAMILY MEDICINE | Facility: CLINIC | Age: 40
End: 2020-12-10

## 2020-12-10 ENCOUNTER — PATIENT MESSAGE (OUTPATIENT)
Dept: FAMILY MEDICINE | Facility: CLINIC | Age: 40
End: 2020-12-10

## 2020-12-10 ENCOUNTER — OFFICE VISIT (OUTPATIENT)
Dept: FAMILY MEDICINE | Facility: CLINIC | Age: 40
End: 2020-12-10

## 2020-12-10 VITALS
DIASTOLIC BLOOD PRESSURE: 78 MMHG | WEIGHT: 195 LBS | TEMPERATURE: 98 F | OXYGEN SATURATION: 99 % | SYSTOLIC BLOOD PRESSURE: 128 MMHG | HEART RATE: 80 BPM | HEIGHT: 72 IN | RESPIRATION RATE: 14 BRPM | BODY MASS INDEX: 26.41 KG/M2

## 2020-12-10 DIAGNOSIS — M79.10 MUSCLE PAIN: ICD-10-CM

## 2020-12-10 DIAGNOSIS — G47.00 INSOMNIA, UNSPECIFIED TYPE: Primary | ICD-10-CM

## 2020-12-10 PROCEDURE — 99214 PR OFFICE/OUTPT VISIT, EST, LEVL IV, 30-39 MIN: ICD-10-PCS | Mod: S$GLB,,, | Performed by: FAMILY MEDICINE

## 2020-12-10 PROCEDURE — 99214 OFFICE O/P EST MOD 30 MIN: CPT | Mod: S$GLB,,, | Performed by: FAMILY MEDICINE

## 2020-12-10 RX ORDER — TEMAZEPAM 15 MG/1
15 CAPSULE ORAL NIGHTLY PRN
Qty: 30 CAPSULE | Refills: 0 | Status: SHIPPED | OUTPATIENT
Start: 2020-12-10 | End: 2021-01-11

## 2020-12-10 RX ORDER — DICLOFENAC SODIUM 75 MG/1
75 TABLET, DELAYED RELEASE ORAL 2 TIMES DAILY PRN
Qty: 60 TABLET | Refills: 3 | Status: SHIPPED | OUTPATIENT
Start: 2020-12-10 | End: 2021-04-12

## 2020-12-10 RX ORDER — METHYLPREDNISOLONE 4 MG/1
TABLET ORAL
Qty: 1 PACKAGE | Refills: 0 | Status: SHIPPED | OUTPATIENT
Start: 2020-12-10 | End: 2020-12-31

## 2020-12-10 NOTE — TELEPHONE ENCOUNTER
----- Message from Aleena Espinosa sent at 12/10/2020  8:17 AM CST -----  Contact: pt  Same Day Appt     Patient called and asked if you will be able to see him today for his Flair up.  Call back  [phone]

## 2020-12-10 NOTE — TELEPHONE ENCOUNTER
----- Message from Dario Cuadra sent at 12/10/2020  2:03 PM CST -----  Contact: wife/Gladis  Unsuccessful call & IM sent to office.  Gladis called in and stated patient is in office with Dr. Wasserman now & got a call from Britta for Gladis to call.  Gladis's call back number is [phone]

## 2020-12-16 NOTE — PROGRESS NOTES
[facility] - Clinic Note    Subjective      Mr. Hinds is a 40 y.o. male who presents to clinic for Extremity Weakness    Patient still has issues with dystonia in the left lower extremity.  He had been having memory fogginess when taking the Sinemet so he taper down on his afternoon dose.  The memory fogginess has improved but his left leg is starting to drop again.  He continues to have pain in his right hand and wrist.  Appears to be de Quervain tenosynovitis.  Is not interested in injections at this time.  Also has been having trouble sleeping.  Feeling more anxious as well.    PMH Storm has a past medical history of Acid reflux, Depression, Muscle spasm, Pain, and Tremors of nervous system.   PSXH Storm has no past surgical history on file.    Storm's family history includes Cancer in his father; Hypertension in his mother, paternal grandfather, and paternal grandmother; Stroke in his mother.   SH Storm reports that he has quit smoking. He has quit using smokeless tobacco. He reports that he does not drink alcohol or use drugs.   ALG Storm has No Known Allergies.   MED Storm has a current medication list which includes the following prescription(s): baclofen, bupropion, carbidopa-levodopa  mg, gabapentin, omeprazole, diclofenac, methylprednisolone, and temazepam.     Review of Systems   Constitutional: Negative for chills and fever.   Respiratory: Negative for shortness of breath.    Cardiovascular: Negative for chest pain.   Musculoskeletal: Positive for myalgias.   Psychiatric/Behavioral: Positive for sleep disturbance.     Objective     /78 (BP Location: Right arm, Patient Position: Sitting, BP Method: Large (Automatic))   Pulse 80   Temp 97.5 °F (36.4 °C) (Temporal)   Resp 14   Ht 6' (1.829 m)   Wt 88.5 kg (195 lb)   SpO2 99%   BMI 26.45 kg/m²     Physical Exam  Vitals signs and nursing note reviewed.   Constitutional:       General: He is not in acute distress.     Appearance: He is  well-developed. He is not diaphoretic.   HENT:      Head: Normocephalic and atraumatic.      Right Ear: External ear normal.      Left Ear: External ear normal.   Eyes:      General:         Right eye: No discharge.         Left eye: No discharge.   Cardiovascular:      Rate and Rhythm: Normal rate and regular rhythm.      Heart sounds: Normal heart sounds.   Pulmonary:      Effort: Pulmonary effort is normal.      Breath sounds: Normal breath sounds. No wheezing or rales.   Skin:     General: Skin is warm and dry.   Neurological:      Mental Status: He is alert and oriented to person, place, and time.   Psychiatric:         Behavior: Behavior normal.         Thought Content: Thought content normal.         Judgment: Judgment normal.        Assessment/Plan     1. Insomnia, unspecified type  temazepam (RESTORIL) 15 mg Cap   2. Muscle pain  diclofenac (VOLTAREN) 75 MG EC tablet    methylPREDNISolone (MEDROL DOSEPACK) 4 mg tablet     Will trial temazepam for insomnia as this may help with his anxiety as well rather than the lorazepam.  Will trial Voltaren and a Medrol Dosepak for tenosynovitis.  Recommended that patient slowly titrate up on afternoon dose of Sinemet to balance memory issues verses leg pain.    No future appointments.      Elliott Wasserman MD  Family Medicine  [facility]

## 2021-02-21 DIAGNOSIS — F43.21 SITUATIONAL DEPRESSION: ICD-10-CM

## 2021-02-22 RX ORDER — BUPROPION HYDROCHLORIDE 150 MG/1
TABLET ORAL
Qty: 60 TABLET | Refills: 5 | Status: SHIPPED | OUTPATIENT
Start: 2021-02-22 | End: 2021-02-23 | Stop reason: SDUPTHER

## 2021-02-23 RX ORDER — BUPROPION HYDROCHLORIDE 150 MG/1
300 TABLET ORAL DAILY
Qty: 60 TABLET | Refills: 5 | Status: SHIPPED | OUTPATIENT
Start: 2021-02-23 | End: 2021-09-27

## 2021-03-04 DIAGNOSIS — Z11.59 NEED FOR HEPATITIS C SCREENING TEST: ICD-10-CM

## 2021-04-22 ENCOUNTER — PATIENT MESSAGE (OUTPATIENT)
Dept: FAMILY MEDICINE | Facility: CLINIC | Age: 41
End: 2021-04-22

## 2021-04-22 DIAGNOSIS — G47.00 INSOMNIA, UNSPECIFIED TYPE: ICD-10-CM

## 2021-04-22 DIAGNOSIS — G24.9 DYSTONIA: Primary | ICD-10-CM

## 2021-04-23 ENCOUNTER — PATIENT MESSAGE (OUTPATIENT)
Dept: FAMILY MEDICINE | Facility: CLINIC | Age: 41
End: 2021-04-23

## 2021-04-23 RX ORDER — TEMAZEPAM 15 MG/1
CAPSULE ORAL
Qty: 30 CAPSULE | Refills: 2 | Status: SHIPPED | OUTPATIENT
Start: 2021-04-23 | End: 2022-03-31

## 2021-04-23 RX ORDER — LORAZEPAM 1 MG/1
1 TABLET ORAL EVERY 12 HOURS PRN
Qty: 60 TABLET | Refills: 0 | Status: SHIPPED | OUTPATIENT
Start: 2021-04-23 | End: 2022-03-31 | Stop reason: SDUPTHER

## 2021-04-28 ENCOUNTER — OFFICE VISIT (OUTPATIENT)
Dept: FAMILY MEDICINE | Facility: CLINIC | Age: 41
End: 2021-04-28

## 2021-04-28 VITALS
TEMPERATURE: 97 F | HEART RATE: 89 BPM | OXYGEN SATURATION: 99 % | DIASTOLIC BLOOD PRESSURE: 76 MMHG | SYSTOLIC BLOOD PRESSURE: 126 MMHG | WEIGHT: 197.19 LBS | HEIGHT: 72 IN | RESPIRATION RATE: 12 BRPM | BODY MASS INDEX: 26.71 KG/M2

## 2021-04-28 DIAGNOSIS — K04.7 DENTAL INFECTION: Primary | ICD-10-CM

## 2021-04-28 DIAGNOSIS — G24.9 DYSTONIA: ICD-10-CM

## 2021-04-28 PROCEDURE — 99214 OFFICE O/P EST MOD 30 MIN: CPT | Mod: S$GLB,,, | Performed by: FAMILY MEDICINE

## 2021-04-28 PROCEDURE — 99214 PR OFFICE/OUTPT VISIT, EST, LEVL IV, 30-39 MIN: ICD-10-PCS | Mod: S$GLB,,, | Performed by: FAMILY MEDICINE

## 2021-04-28 RX ORDER — AMOXICILLIN 500 MG/1
500 TABLET, FILM COATED ORAL EVERY 12 HOURS
Qty: 20 TABLET | Refills: 0 | Status: SHIPPED | OUTPATIENT
Start: 2021-04-28 | End: 2021-05-08

## 2021-05-31 ENCOUNTER — TELEPHONE (OUTPATIENT)
Dept: FAMILY MEDICINE | Facility: CLINIC | Age: 41
End: 2021-05-31

## 2021-07-12 ENCOUNTER — PATIENT MESSAGE (OUTPATIENT)
Dept: FAMILY MEDICINE | Facility: CLINIC | Age: 41
End: 2021-07-12

## 2021-08-24 ENCOUNTER — PATIENT MESSAGE (OUTPATIENT)
Dept: FAMILY MEDICINE | Facility: CLINIC | Age: 41
End: 2021-08-24

## 2021-08-26 ENCOUNTER — OFFICE VISIT (OUTPATIENT)
Dept: FAMILY MEDICINE | Facility: CLINIC | Age: 41
End: 2021-08-26

## 2021-08-26 DIAGNOSIS — M79.10 MUSCLE PAIN: ICD-10-CM

## 2021-08-26 DIAGNOSIS — G24.9 DYSTONIA: ICD-10-CM

## 2021-08-26 PROCEDURE — 99214 OFFICE O/P EST MOD 30 MIN: CPT | Mod: 95,,, | Performed by: FAMILY MEDICINE

## 2021-08-26 PROCEDURE — 99214 PR OFFICE/OUTPT VISIT, EST, LEVL IV, 30-39 MIN: ICD-10-PCS | Mod: 95,,, | Performed by: FAMILY MEDICINE

## 2021-08-26 RX ORDER — CARBIDOPA AND LEVODOPA 25; 100 MG/1; MG/1
2 TABLET ORAL 3 TIMES DAILY
Qty: 180 TABLET | Refills: 11 | Status: SHIPPED | OUTPATIENT
Start: 2021-08-26 | End: 2022-09-04 | Stop reason: SDUPTHER

## 2021-08-26 RX ORDER — DICLOFENAC SODIUM 75 MG/1
75 TABLET, DELAYED RELEASE ORAL 2 TIMES DAILY PRN
Qty: 60 TABLET | Refills: 5 | Status: SHIPPED | OUTPATIENT
Start: 2021-08-26 | End: 2022-02-07

## 2021-09-09 ENCOUNTER — IMMUNIZATION (OUTPATIENT)
Dept: FAMILY MEDICINE | Facility: CLINIC | Age: 41
End: 2021-09-09

## 2021-09-09 DIAGNOSIS — Z23 NEED FOR VACCINATION: Primary | ICD-10-CM

## 2021-09-09 PROCEDURE — 91300 COVID-19, MRNA, LNP-S, PF, 30 MCG/0.3 ML DOSE VACCINE: ICD-10-PCS | Mod: ,,, | Performed by: FAMILY MEDICINE

## 2021-09-09 PROCEDURE — 91300 COVID-19, MRNA, LNP-S, PF, 30 MCG/0.3 ML DOSE VACCINE: CPT | Mod: ,,, | Performed by: FAMILY MEDICINE

## 2021-09-09 PROCEDURE — 0001A COVID-19, MRNA, LNP-S, PF, 30 MCG/0.3 ML DOSE VACCINE: ICD-10-PCS | Mod: CV19,,, | Performed by: FAMILY MEDICINE

## 2021-09-09 PROCEDURE — 0001A COVID-19, MRNA, LNP-S, PF, 30 MCG/0.3 ML DOSE VACCINE: CPT | Mod: CV19,,, | Performed by: FAMILY MEDICINE

## 2021-10-21 ENCOUNTER — IMMUNIZATION (OUTPATIENT)
Dept: FAMILY MEDICINE | Facility: CLINIC | Age: 41
End: 2021-10-21
Payer: COMMERCIAL

## 2021-10-21 ENCOUNTER — PATIENT MESSAGE (OUTPATIENT)
Dept: FAMILY MEDICINE | Facility: CLINIC | Age: 41
End: 2021-10-21

## 2021-10-21 DIAGNOSIS — Z23 NEED FOR VACCINATION: Primary | ICD-10-CM

## 2021-10-21 DIAGNOSIS — K04.7 DENTAL INFECTION: Primary | ICD-10-CM

## 2021-10-21 PROCEDURE — 0002A COVID-19, MRNA, LNP-S, PF, 30 MCG/0.3 ML DOSE VACCINE: CPT | Mod: PBBFAC | Performed by: FAMILY MEDICINE

## 2021-10-21 PROCEDURE — 91300 COVID-19, MRNA, LNP-S, PF, 30 MCG/0.3 ML DOSE VACCINE: CPT | Mod: PBBFAC | Performed by: FAMILY MEDICINE

## 2021-10-22 ENCOUNTER — PATIENT MESSAGE (OUTPATIENT)
Dept: FAMILY MEDICINE | Facility: CLINIC | Age: 41
End: 2021-10-22
Payer: COMMERCIAL

## 2021-10-22 RX ORDER — AMOXICILLIN 500 MG/1
500 TABLET, FILM COATED ORAL EVERY 12 HOURS
Qty: 20 TABLET | Refills: 0 | Status: SHIPPED | OUTPATIENT
Start: 2021-10-22 | End: 2021-11-01

## 2022-03-21 DIAGNOSIS — F43.21 SITUATIONAL DEPRESSION: ICD-10-CM

## 2022-03-23 RX ORDER — BUPROPION HYDROCHLORIDE 150 MG/1
300 TABLET ORAL DAILY
Qty: 60 TABLET | Refills: 2 | Status: SHIPPED | OUTPATIENT
Start: 2022-03-23 | End: 2022-03-31

## 2022-03-25 ENCOUNTER — PATIENT MESSAGE (OUTPATIENT)
Dept: FAMILY MEDICINE | Facility: CLINIC | Age: 42
End: 2022-03-25
Payer: COMMERCIAL

## 2022-03-25 DIAGNOSIS — M79.10 MUSCLE PAIN: Primary | ICD-10-CM

## 2022-03-25 RX ORDER — BACLOFEN 20 MG/1
20 TABLET ORAL 3 TIMES DAILY
Qty: 90 TABLET | Refills: 11 | Status: SHIPPED | OUTPATIENT
Start: 2022-03-25 | End: 2022-12-23 | Stop reason: SDUPTHER

## 2022-03-31 ENCOUNTER — OFFICE VISIT (OUTPATIENT)
Dept: FAMILY MEDICINE | Facility: CLINIC | Age: 42
End: 2022-03-31
Payer: COMMERCIAL

## 2022-03-31 VITALS
SYSTOLIC BLOOD PRESSURE: 126 MMHG | BODY MASS INDEX: 26.14 KG/M2 | WEIGHT: 193 LBS | DIASTOLIC BLOOD PRESSURE: 82 MMHG | OXYGEN SATURATION: 98 % | RESPIRATION RATE: 14 BRPM | HEIGHT: 72 IN | TEMPERATURE: 98 F | HEART RATE: 74 BPM

## 2022-03-31 DIAGNOSIS — R68.89 HEAT INTOLERANCE: ICD-10-CM

## 2022-03-31 DIAGNOSIS — Z11.59 ENCOUNTER FOR HEPATITIS C SCREENING TEST FOR LOW RISK PATIENT: ICD-10-CM

## 2022-03-31 DIAGNOSIS — Z00.00 ANNUAL PHYSICAL EXAM: Primary | ICD-10-CM

## 2022-03-31 DIAGNOSIS — R09.81 NASAL CONGESTION: ICD-10-CM

## 2022-03-31 DIAGNOSIS — G24.9 DYSTONIA: ICD-10-CM

## 2022-03-31 PROCEDURE — 3008F PR BODY MASS INDEX (BMI) DOCUMENTED: ICD-10-PCS | Mod: CPTII,S$GLB,, | Performed by: FAMILY MEDICINE

## 2022-03-31 PROCEDURE — 99396 PR PREVENTIVE VISIT,EST,40-64: ICD-10-PCS | Mod: S$GLB,,, | Performed by: FAMILY MEDICINE

## 2022-03-31 PROCEDURE — 3074F SYST BP LT 130 MM HG: CPT | Mod: CPTII,S$GLB,, | Performed by: FAMILY MEDICINE

## 2022-03-31 PROCEDURE — 1160F RVW MEDS BY RX/DR IN RCRD: CPT | Mod: CPTII,S$GLB,, | Performed by: FAMILY MEDICINE

## 2022-03-31 PROCEDURE — 3079F DIAST BP 80-89 MM HG: CPT | Mod: CPTII,S$GLB,, | Performed by: FAMILY MEDICINE

## 2022-03-31 PROCEDURE — 3008F BODY MASS INDEX DOCD: CPT | Mod: CPTII,S$GLB,, | Performed by: FAMILY MEDICINE

## 2022-03-31 PROCEDURE — 3074F PR MOST RECENT SYSTOLIC BLOOD PRESSURE < 130 MM HG: ICD-10-PCS | Mod: CPTII,S$GLB,, | Performed by: FAMILY MEDICINE

## 2022-03-31 PROCEDURE — 1159F MED LIST DOCD IN RCRD: CPT | Mod: CPTII,S$GLB,, | Performed by: FAMILY MEDICINE

## 2022-03-31 PROCEDURE — 3079F PR MOST RECENT DIASTOLIC BLOOD PRESSURE 80-89 MM HG: ICD-10-PCS | Mod: CPTII,S$GLB,, | Performed by: FAMILY MEDICINE

## 2022-03-31 PROCEDURE — 1160F PR REVIEW ALL MEDS BY PRESCRIBER/CLIN PHARMACIST DOCUMENTED: ICD-10-PCS | Mod: CPTII,S$GLB,, | Performed by: FAMILY MEDICINE

## 2022-03-31 PROCEDURE — 1159F PR MEDICATION LIST DOCUMENTED IN MEDICAL RECORD: ICD-10-PCS | Mod: CPTII,S$GLB,, | Performed by: FAMILY MEDICINE

## 2022-03-31 PROCEDURE — 99999 PR PBB SHADOW E&M-EST. PATIENT-LVL IV: ICD-10-PCS | Mod: PBBFAC,,, | Performed by: FAMILY MEDICINE

## 2022-03-31 PROCEDURE — 99999 PR PBB SHADOW E&M-EST. PATIENT-LVL IV: CPT | Mod: PBBFAC,,, | Performed by: FAMILY MEDICINE

## 2022-03-31 PROCEDURE — 99396 PREV VISIT EST AGE 40-64: CPT | Mod: S$GLB,,, | Performed by: FAMILY MEDICINE

## 2022-03-31 RX ORDER — LORAZEPAM 1 MG/1
1 TABLET ORAL EVERY 12 HOURS PRN
Qty: 60 TABLET | Refills: 0 | Status: SHIPPED | OUTPATIENT
Start: 2022-03-31 | End: 2023-01-27

## 2022-03-31 RX ORDER — AZELASTINE 1 MG/ML
1 SPRAY, METERED NASAL 2 TIMES DAILY
Qty: 30 ML | Refills: 3 | Status: SHIPPED | OUTPATIENT
Start: 2022-03-31 | End: 2022-12-23

## 2022-03-31 NOTE — PROGRESS NOTES
[facility] - Clinic Note    Subjective      Mr. Hinds is a 42 y.o. male who presents to clinic for Follow-up (Ativan refill)    Medications have been working well.  He has been off of the Wellbutrin for the last month and a half.  Has been doing fine.  Needs a refill of the abdomen.  Last prescription was filled a year ago.  Takes this very infrequently.  Has noticed some swelling when he is in the sun.    PMH Storm has a past medical history of Acid reflux, Depression, Muscle spasm, Pain, and Tremors of nervous system.   PSXH Storm has no past surgical history on file.   FH Storm's family history includes Cancer in his father; Hypertension in his mother, paternal grandfather, and paternal grandmother; Stroke in his mother.   SH Storm reports that he has quit smoking. He has quit using smokeless tobacco. He reports that he does not drink alcohol and does not use drugs.   ALG Storm has No Known Allergies.   MED Storm has a current medication list which includes the following prescription(s): baclofen, carbidopa-levodopa  mg, diclofenac, gabapentin, azelastine, and lorazepam.     Review of Systems   Constitutional: Negative for chills and fever.   HENT: Negative for dental problem.    Respiratory: Negative for shortness of breath.    Cardiovascular: Negative for chest pain.   Musculoskeletal: Positive for myalgias.   Psychiatric/Behavioral: Negative for sleep disturbance.     Objective     /82 (BP Location: Left arm, Patient Position: Sitting, BP Method: Large (Manual))   Pulse 74   Temp 97.9 °F (36.6 °C) (Oral)   Resp 14   Ht 6' (1.829 m)   Wt 87.5 kg (193 lb)   SpO2 98%   BMI 26.18 kg/m²     Physical Exam  Vitals and nursing note reviewed.   Constitutional:       General: He is not in acute distress.     Appearance: He is well-developed. He is not diaphoretic.   HENT:      Head: Normocephalic and atraumatic.      Right Ear: External ear normal.      Left Ear: External ear normal.   Eyes:       General:         Right eye: No discharge.         Left eye: No discharge.   Cardiovascular:      Rate and Rhythm: Normal rate and regular rhythm.      Heart sounds: Normal heart sounds.   Pulmonary:      Effort: Pulmonary effort is normal.      Breath sounds: Normal breath sounds. No wheezing or rales.   Skin:     General: Skin is warm and dry.   Neurological:      Mental Status: He is alert and oriented to person, place, and time.   Psychiatric:         Behavior: Behavior normal.         Thought Content: Thought content normal.         Judgment: Judgment normal.        Assessment/Plan     1. Annual physical exam  Lipid Panel    Hemoglobin A1C   2. Heat intolerance  Comprehensive Metabolic Panel    T4, Free    TSH    CBC Auto Differential   3. Encounter for hepatitis C screening test for low risk patient  Hepatitis C Antibody   4. Nasal congestion  azelastine (ASTELIN) 137 mcg (0.1 %) nasal spray   5. Dystonia  LORazepam (ATIVAN) 1 MG tablet     Has been stable on his current medications.  Will trial Astelin spray for nasal congestion.  Refilled Ativan.   reviewed.  No red flags.  Also due for yearly labs as above.    Future Appointments   Date Time Provider Department Center   4/1/2022  9:20 AM [facility], LABORATORY [facility] LAB [facility]   10/4/2022 10:20 AM Elliott Wasserman MD [facility]         Elliott Wasserman MD  Family Medicine  [facility]

## 2022-04-01 ENCOUNTER — LAB VISIT (OUTPATIENT)
Dept: LAB | Facility: HOSPITAL | Age: 42
End: 2022-04-01
Attending: FAMILY MEDICINE
Payer: COMMERCIAL

## 2022-04-01 DIAGNOSIS — R68.89 HEAT INTOLERANCE: ICD-10-CM

## 2022-04-01 DIAGNOSIS — Z11.59 ENCOUNTER FOR HEPATITIS C SCREENING TEST FOR LOW RISK PATIENT: ICD-10-CM

## 2022-04-01 DIAGNOSIS — Z00.00 ANNUAL PHYSICAL EXAM: ICD-10-CM

## 2022-04-01 LAB
ALBUMIN SERPL BCP-MCNC: 4 G/DL (ref 3.5–5.2)
ALP SERPL-CCNC: 80 U/L (ref 55–135)
ALT SERPL W/O P-5'-P-CCNC: 14 U/L (ref 10–44)
ANION GAP SERPL CALC-SCNC: 10 MMOL/L (ref 8–16)
AST SERPL-CCNC: 41 U/L (ref 10–40)
BASOPHILS # BLD AUTO: 0.02 K/UL (ref 0–0.2)
BASOPHILS NFR BLD: 0.3 % (ref 0–1.9)
BILIRUB SERPL-MCNC: 0.4 MG/DL (ref 0.1–1)
BUN SERPL-MCNC: 15 MG/DL (ref 6–20)
CALCIUM SERPL-MCNC: 9.2 MG/DL (ref 8.7–10.5)
CHLORIDE SERPL-SCNC: 105 MMOL/L (ref 95–110)
CHOLEST SERPL-MCNC: 191 MG/DL (ref 120–199)
CHOLEST/HDLC SERPL: 3.5 {RATIO} (ref 2–5)
CO2 SERPL-SCNC: 27 MMOL/L (ref 23–29)
CREAT SERPL-MCNC: 1 MG/DL (ref 0.5–1.4)
DIFFERENTIAL METHOD: ABNORMAL
EOSINOPHIL # BLD AUTO: 0.3 K/UL (ref 0–0.5)
EOSINOPHIL NFR BLD: 4.6 % (ref 0–8)
ERYTHROCYTE [DISTWIDTH] IN BLOOD BY AUTOMATED COUNT: 13.2 % (ref 11.5–14.5)
EST. GFR  (AFRICAN AMERICAN): >60 ML/MIN/1.73 M^2
EST. GFR  (NON AFRICAN AMERICAN): >60 ML/MIN/1.73 M^2
ESTIMATED AVG GLUCOSE: 111 MG/DL (ref 68–131)
GLUCOSE SERPL-MCNC: 102 MG/DL (ref 70–110)
HBA1C MFR BLD: 5.5 % (ref 4–5.6)
HCT VFR BLD AUTO: 42.2 % (ref 40–54)
HDLC SERPL-MCNC: 55 MG/DL (ref 40–75)
HDLC SERPL: 28.8 % (ref 20–50)
HGB BLD-MCNC: 14.1 G/DL (ref 14–18)
IMM GRANULOCYTES # BLD AUTO: 0.01 K/UL (ref 0–0.04)
IMM GRANULOCYTES NFR BLD AUTO: 0.1 % (ref 0–0.5)
LDLC SERPL CALC-MCNC: 103.6 MG/DL (ref 63–159)
LYMPHOCYTES # BLD AUTO: 2.5 K/UL (ref 1–4.8)
LYMPHOCYTES NFR BLD: 33.6 % (ref 18–48)
MCH RBC QN AUTO: 29.6 PG (ref 27–31)
MCHC RBC AUTO-ENTMCNC: 33.4 G/DL (ref 32–36)
MCV RBC AUTO: 89 FL (ref 82–98)
MONOCYTES # BLD AUTO: 0.5 K/UL (ref 0.3–1)
MONOCYTES NFR BLD: 6 % (ref 4–15)
NEUTROPHILS # BLD AUTO: 4.1 K/UL (ref 1.8–7.7)
NEUTROPHILS NFR BLD: 55.4 % (ref 38–73)
NONHDLC SERPL-MCNC: 136 MG/DL
NRBC BLD-RTO: 0 /100 WBC
PLATELET # BLD AUTO: 304 K/UL (ref 150–450)
PMV BLD AUTO: 8.8 FL (ref 9.2–12.9)
POTASSIUM SERPL-SCNC: 4.2 MMOL/L (ref 3.5–5.1)
PROT SERPL-MCNC: 7.5 G/DL (ref 6–8.4)
RBC # BLD AUTO: 4.77 M/UL (ref 4.6–6.2)
SODIUM SERPL-SCNC: 142 MMOL/L (ref 136–145)
T4 FREE SERPL-MCNC: 1.08 NG/DL (ref 0.71–1.51)
TRIGL SERPL-MCNC: 162 MG/DL (ref 30–150)
TSH SERPL DL<=0.005 MIU/L-ACNC: 0.7 UIU/ML (ref 0.4–4)
WBC # BLD AUTO: 7.46 K/UL (ref 3.9–12.7)

## 2022-04-01 PROCEDURE — 36415 COLL VENOUS BLD VENIPUNCTURE: CPT | Performed by: FAMILY MEDICINE

## 2022-04-01 PROCEDURE — 84443 ASSAY THYROID STIM HORMONE: CPT | Performed by: FAMILY MEDICINE

## 2022-04-01 PROCEDURE — 83036 HEMOGLOBIN GLYCOSYLATED A1C: CPT | Performed by: FAMILY MEDICINE

## 2022-04-01 PROCEDURE — 86803 HEPATITIS C AB TEST: CPT | Performed by: FAMILY MEDICINE

## 2022-04-01 PROCEDURE — 80061 LIPID PANEL: CPT | Performed by: FAMILY MEDICINE

## 2022-04-01 PROCEDURE — 80053 COMPREHEN METABOLIC PANEL: CPT | Performed by: FAMILY MEDICINE

## 2022-04-01 PROCEDURE — 85025 COMPLETE CBC W/AUTO DIFF WBC: CPT | Performed by: FAMILY MEDICINE

## 2022-04-01 PROCEDURE — 84439 ASSAY OF FREE THYROXINE: CPT | Performed by: FAMILY MEDICINE

## 2022-04-04 LAB — HCV AB SERPL QL IA: NEGATIVE

## 2022-04-26 ENCOUNTER — OFFICE VISIT (OUTPATIENT)
Dept: FAMILY MEDICINE | Facility: CLINIC | Age: 42
End: 2022-04-26
Payer: COMMERCIAL

## 2022-04-26 VITALS
RESPIRATION RATE: 14 BRPM | TEMPERATURE: 98 F | BODY MASS INDEX: 26.09 KG/M2 | HEART RATE: 83 BPM | OXYGEN SATURATION: 99 % | WEIGHT: 192.63 LBS | DIASTOLIC BLOOD PRESSURE: 82 MMHG | SYSTOLIC BLOOD PRESSURE: 136 MMHG | HEIGHT: 72 IN

## 2022-04-26 DIAGNOSIS — R63.5 WEIGHT GAIN: ICD-10-CM

## 2022-04-26 DIAGNOSIS — M79.10 MUSCLE PAIN: ICD-10-CM

## 2022-04-26 DIAGNOSIS — G47.19 DAYTIME HYPERSOMNOLENCE: Primary | ICD-10-CM

## 2022-04-26 PROCEDURE — 3075F PR MOST RECENT SYSTOLIC BLOOD PRESS GE 130-139MM HG: ICD-10-PCS | Mod: CPTII,S$GLB,, | Performed by: FAMILY MEDICINE

## 2022-04-26 PROCEDURE — 1159F PR MEDICATION LIST DOCUMENTED IN MEDICAL RECORD: ICD-10-PCS | Mod: CPTII,S$GLB,, | Performed by: FAMILY MEDICINE

## 2022-04-26 PROCEDURE — 3008F BODY MASS INDEX DOCD: CPT | Mod: CPTII,S$GLB,, | Performed by: FAMILY MEDICINE

## 2022-04-26 PROCEDURE — 3079F DIAST BP 80-89 MM HG: CPT | Mod: CPTII,S$GLB,, | Performed by: FAMILY MEDICINE

## 2022-04-26 PROCEDURE — 3008F PR BODY MASS INDEX (BMI) DOCUMENTED: ICD-10-PCS | Mod: CPTII,S$GLB,, | Performed by: FAMILY MEDICINE

## 2022-04-26 PROCEDURE — 1160F PR REVIEW ALL MEDS BY PRESCRIBER/CLIN PHARMACIST DOCUMENTED: ICD-10-PCS | Mod: CPTII,S$GLB,, | Performed by: FAMILY MEDICINE

## 2022-04-26 PROCEDURE — 1159F MED LIST DOCD IN RCRD: CPT | Mod: CPTII,S$GLB,, | Performed by: FAMILY MEDICINE

## 2022-04-26 PROCEDURE — 3075F SYST BP GE 130 - 139MM HG: CPT | Mod: CPTII,S$GLB,, | Performed by: FAMILY MEDICINE

## 2022-04-26 PROCEDURE — 3044F PR MOST RECENT HEMOGLOBIN A1C LEVEL <7.0%: ICD-10-PCS | Mod: CPTII,S$GLB,, | Performed by: FAMILY MEDICINE

## 2022-04-26 PROCEDURE — 1160F RVW MEDS BY RX/DR IN RCRD: CPT | Mod: CPTII,S$GLB,, | Performed by: FAMILY MEDICINE

## 2022-04-26 PROCEDURE — 99999 PR PBB SHADOW E&M-EST. PATIENT-LVL IV: ICD-10-PCS | Mod: PBBFAC,,, | Performed by: FAMILY MEDICINE

## 2022-04-26 PROCEDURE — 3079F PR MOST RECENT DIASTOLIC BLOOD PRESSURE 80-89 MM HG: ICD-10-PCS | Mod: CPTII,S$GLB,, | Performed by: FAMILY MEDICINE

## 2022-04-26 PROCEDURE — 3044F HG A1C LEVEL LT 7.0%: CPT | Mod: CPTII,S$GLB,, | Performed by: FAMILY MEDICINE

## 2022-04-26 PROCEDURE — 99999 PR PBB SHADOW E&M-EST. PATIENT-LVL IV: CPT | Mod: PBBFAC,,, | Performed by: FAMILY MEDICINE

## 2022-04-26 PROCEDURE — 99214 PR OFFICE/OUTPT VISIT, EST, LEVL IV, 30-39 MIN: ICD-10-PCS | Mod: S$GLB,,, | Performed by: FAMILY MEDICINE

## 2022-04-26 PROCEDURE — 99214 OFFICE O/P EST MOD 30 MIN: CPT | Mod: S$GLB,,, | Performed by: FAMILY MEDICINE

## 2022-04-26 RX ORDER — DICLOFENAC SODIUM 75 MG/1
75 TABLET, DELAYED RELEASE ORAL 2 TIMES DAILY PRN
Qty: 180 TABLET | Refills: 2 | Status: SHIPPED | OUTPATIENT
Start: 2022-04-26 | End: 2022-07-30 | Stop reason: SDUPTHER

## 2022-04-26 NOTE — PROGRESS NOTES
[facility] - Clinic Note    Subjective      Mr. Hinds is a 42 y.o. male who presents to clinic for Fatigue and Weight Gain    Has been having weight gain.  About 20 lb up.  Has been trying dietary change and exercise.  Also has been tired throughout the day.  Daytime hypersomnolence.    PMH Storm has a past medical history of Acid reflux, Depression, Muscle spasm, Pain, and Tremors of nervous system.   PSXH Storm has no past surgical history on file.   FH Storm's family history includes Cancer in his father; Hypertension in his mother, paternal grandfather, and paternal grandmother; Stroke in his mother.   SH Storm reports that he has quit smoking. He has quit using smokeless tobacco. He reports that he does not drink alcohol and does not use drugs.   ALG Storm has No Known Allergies.   MED Storm has a current medication list which includes the following prescription(s): baclofen, carbidopa-levodopa  mg, gabapentin, lorazepam, azelastine, diclofenac, and semaglutide.     Review of Systems   Constitutional: Negative for chills and fever.   HENT: Negative for dental problem.    Respiratory: Negative for shortness of breath.    Cardiovascular: Negative for chest pain.   Musculoskeletal: Positive for myalgias.   Psychiatric/Behavioral: Negative for sleep disturbance.     Objective     /82 (BP Location: Left arm, Patient Position: Sitting, BP Method: Large (Manual))   Pulse 83   Temp 98.1 °F (36.7 °C) (Temporal)   Resp 14   Ht 6' (1.829 m)   Wt 87.4 kg (192 lb 9.6 oz)   SpO2 99%   BMI 26.12 kg/m²     Physical Exam  Vitals and nursing note reviewed.   Constitutional:       General: He is not in acute distress.     Appearance: He is well-developed. He is not diaphoretic.   HENT:      Head: Normocephalic and atraumatic.      Right Ear: External ear normal.      Left Ear: External ear normal.   Eyes:      General:         Right eye: No discharge.         Left eye: No discharge.   Cardiovascular:       Rate and Rhythm: Normal rate and regular rhythm.      Heart sounds: Normal heart sounds.   Pulmonary:      Effort: Pulmonary effort is normal.      Breath sounds: Normal breath sounds. No wheezing or rales.   Skin:     General: Skin is warm and dry.   Neurological:      Mental Status: He is alert and oriented to person, place, and time.   Psychiatric:         Behavior: Behavior normal.         Thought Content: Thought content normal.         Judgment: Judgment normal.        Assessment/Plan     1. Daytime hypersomnolence  Polysomnogram (CPAP will be added if patient meets diagnostic criteria.)   2. Weight gain  semaglutide (OZEMPIC) 0.25 mg or 0.5 mg(2 mg/1.5 mL) pen injector   3. Muscle pain  diclofenac (VOLTAREN) 75 MG EC tablet     Will obtain sleep study given daytime hypersomnolence.  Start Ozempic for weight gain as this would benefit patient given his conditions.  He has tried dietary change and exercise.  He will continue to do this along with the Ozempic.  Follow-up as previously scheduled.    Future Appointments   Date Time Provider Department Center   10/4/2022 10:20 AM Elliott Wasserman MD [facility]         Elliott Wasserman MD  Family Medicine  [facility]

## 2022-07-27 DIAGNOSIS — R20.0 RIGHT LEG NUMBNESS: ICD-10-CM

## 2022-07-27 DIAGNOSIS — R20.0 LEFT ARM NUMBNESS: ICD-10-CM

## 2022-07-27 RX ORDER — GABAPENTIN 300 MG/1
CAPSULE ORAL
Qty: 90 CAPSULE | Refills: 0 | Status: SHIPPED | OUTPATIENT
Start: 2022-07-27 | End: 2022-07-30 | Stop reason: SDUPTHER

## 2022-08-29 ENCOUNTER — PATIENT MESSAGE (OUTPATIENT)
Dept: FAMILY MEDICINE | Facility: CLINIC | Age: 42
End: 2022-08-29
Payer: COMMERCIAL

## 2022-09-06 ENCOUNTER — PROCEDURE VISIT (OUTPATIENT)
Dept: SLEEP MEDICINE | Facility: HOSPITAL | Age: 42
End: 2022-09-06
Attending: FAMILY MEDICINE
Payer: COMMERCIAL

## 2022-09-06 DIAGNOSIS — G47.33 OSA (OBSTRUCTIVE SLEEP APNEA): ICD-10-CM

## 2022-09-06 PROCEDURE — 95811 POLYSOM 6/>YRS CPAP 4/> PARM: CPT

## 2022-09-15 ENCOUNTER — OFFICE VISIT (OUTPATIENT)
Dept: FAMILY MEDICINE | Facility: CLINIC | Age: 42
End: 2022-09-15
Payer: COMMERCIAL

## 2022-09-15 VITALS
WEIGHT: 194.81 LBS | DIASTOLIC BLOOD PRESSURE: 72 MMHG | BODY MASS INDEX: 26.38 KG/M2 | RESPIRATION RATE: 14 BRPM | HEART RATE: 81 BPM | OXYGEN SATURATION: 97 % | HEIGHT: 72 IN | TEMPERATURE: 99 F | SYSTOLIC BLOOD PRESSURE: 126 MMHG

## 2022-09-15 DIAGNOSIS — U07.1 COVID-19: Primary | ICD-10-CM

## 2022-09-15 DIAGNOSIS — F32.1 CURRENT MODERATE EPISODE OF MAJOR DEPRESSIVE DISORDER WITHOUT PRIOR EPISODE: ICD-10-CM

## 2022-09-15 DIAGNOSIS — G20.A1 PARKINSONS DISEASE: ICD-10-CM

## 2022-09-15 DIAGNOSIS — G95.9 MYELOPATHY: ICD-10-CM

## 2022-09-15 PROCEDURE — 3078F DIAST BP <80 MM HG: CPT | Mod: CPTII,S$GLB,, | Performed by: FAMILY MEDICINE

## 2022-09-15 PROCEDURE — 1160F PR REVIEW ALL MEDS BY PRESCRIBER/CLIN PHARMACIST DOCUMENTED: ICD-10-PCS | Mod: CPTII,S$GLB,, | Performed by: FAMILY MEDICINE

## 2022-09-15 PROCEDURE — 3074F SYST BP LT 130 MM HG: CPT | Mod: CPTII,S$GLB,, | Performed by: FAMILY MEDICINE

## 2022-09-15 PROCEDURE — 99214 OFFICE O/P EST MOD 30 MIN: CPT | Mod: S$GLB,,, | Performed by: FAMILY MEDICINE

## 2022-09-15 PROCEDURE — 99999 PR PBB SHADOW E&M-EST. PATIENT-LVL IV: CPT | Mod: PBBFAC,,, | Performed by: FAMILY MEDICINE

## 2022-09-15 PROCEDURE — 99214 PR OFFICE/OUTPT VISIT, EST, LEVL IV, 30-39 MIN: ICD-10-PCS | Mod: S$GLB,,, | Performed by: FAMILY MEDICINE

## 2022-09-15 PROCEDURE — 1159F PR MEDICATION LIST DOCUMENTED IN MEDICAL RECORD: ICD-10-PCS | Mod: CPTII,S$GLB,, | Performed by: FAMILY MEDICINE

## 2022-09-15 PROCEDURE — 3008F PR BODY MASS INDEX (BMI) DOCUMENTED: ICD-10-PCS | Mod: CPTII,S$GLB,, | Performed by: FAMILY MEDICINE

## 2022-09-15 PROCEDURE — 3074F PR MOST RECENT SYSTOLIC BLOOD PRESSURE < 130 MM HG: ICD-10-PCS | Mod: CPTII,S$GLB,, | Performed by: FAMILY MEDICINE

## 2022-09-15 PROCEDURE — 3078F PR MOST RECENT DIASTOLIC BLOOD PRESSURE < 80 MM HG: ICD-10-PCS | Mod: CPTII,S$GLB,, | Performed by: FAMILY MEDICINE

## 2022-09-15 PROCEDURE — 1160F RVW MEDS BY RX/DR IN RCRD: CPT | Mod: CPTII,S$GLB,, | Performed by: FAMILY MEDICINE

## 2022-09-15 PROCEDURE — 3008F BODY MASS INDEX DOCD: CPT | Mod: CPTII,S$GLB,, | Performed by: FAMILY MEDICINE

## 2022-09-15 PROCEDURE — 3044F PR MOST RECENT HEMOGLOBIN A1C LEVEL <7.0%: ICD-10-PCS | Mod: CPTII,S$GLB,, | Performed by: FAMILY MEDICINE

## 2022-09-15 PROCEDURE — 3044F HG A1C LEVEL LT 7.0%: CPT | Mod: CPTII,S$GLB,, | Performed by: FAMILY MEDICINE

## 2022-09-15 PROCEDURE — 1159F MED LIST DOCD IN RCRD: CPT | Mod: CPTII,S$GLB,, | Performed by: FAMILY MEDICINE

## 2022-09-15 PROCEDURE — 99999 PR PBB SHADOW E&M-EST. PATIENT-LVL IV: ICD-10-PCS | Mod: PBBFAC,,, | Performed by: FAMILY MEDICINE

## 2022-09-15 NOTE — PROGRESS NOTES
[facility] - Clinic Note    Subjective      Mr. Hinds is a 42 y.o. male who presents to clinic for Generalized Body Aches, Cough, and Nasal Congestion    Patient reports that for the last 1-2 days he has had nasal congestion, body aches, cough.  Denies any fever chills.  Has not taken anything to help with symptoms.    PMH Storm has a past medical history of Acid reflux, Depression, Muscle spasm, Pain, and Tremors of nervous system.   PSXH Storm has no past surgical history on file.   FH Storm's family history includes Cancer in his father; Hypertension in his mother, paternal grandfather, and paternal grandmother; Stroke in his mother.   SH Storm reports that he has quit smoking. He has quit using smokeless tobacco. He reports that he does not drink alcohol and does not use drugs.   ALG Storm has No Known Allergies.   MED Storm has a current medication list which includes the following prescription(s): azelastine, baclofen, carbidopa-levodopa  mg, diclofenac, gabapentin, semaglutide, lorazepam, and nirmatrelvir-ritonavir.     Review of Systems   Constitutional:  Positive for fatigue. Negative for chills and fever.   HENT:  Positive for congestion.    Respiratory:  Positive for cough.    Musculoskeletal:  Positive for myalgias.   Objective     /72 (BP Location: Right arm, Patient Position: Sitting, BP Method: Large (Manual))   Pulse 81   Temp 98.6 °F (37 °C) (Temporal)   Resp 14   Ht 6' (1.829 m)   Wt 88.4 kg (194 lb 12.8 oz)   SpO2 97%   BMI 26.42 kg/m²     Physical Exam  Vitals and nursing note reviewed.   Constitutional:       General: He is not in acute distress.     Appearance: He is well-developed.   HENT:      Head: Normocephalic and atraumatic.      Right Ear: Tympanic membrane, ear canal and external ear normal.      Left Ear: Tympanic membrane, ear canal and external ear normal.      Nose: Mucosal edema present.   Eyes:      General:         Right eye: No discharge.         Left eye:  No discharge.   Cardiovascular:      Rate and Rhythm: Normal rate and regular rhythm.      Heart sounds: Normal heart sounds.   Pulmonary:      Effort: Pulmonary effort is normal.      Breath sounds: Normal breath sounds. No wheezing or rales.   Skin:     General: Skin is warm and dry.   Neurological:      General: No focal deficit present.      Mental Status: He is alert. Mental status is at baseline.   Psychiatric:         Mood and Affect: Mood normal.         Behavior: Behavior normal.         Thought Content: Thought content normal.         Judgment: Judgment normal.      Assessment/Plan     1. COVID-19  nirmatrelvir-ritonavir 300 mg (150 mg x 2)-100 mg copackaged tablets (EUA)    POCT COVID-19 Rapid Screening      2. Myelopathy        3. Parkinsons disease        4. Current moderate episode of major depressive disorder without prior episode          Rapid COVID test positive.  Will treat with Paxlovid  Home quarantine for 5 days.  Supportive care.    1. COVID-19  -     nirmatrelvir-ritonavir 300 mg (150 mg x 2)-100 mg copackaged tablets (EUA)  -     POCT COVID-19 Rapid Screening    2. Myelopathy  Assessment & Plan:  On sinemet      3. Parkinsons disease  Assessment & Plan:  On sinemet      4. Current moderate episode of major depressive disorder without prior episode  Overview:  Currently prescribed Wellbutrin and Ativan (taking Ativan daily).       Assessment & Plan:  Monitoring symptoms off wellbutrin        Future Appointments   Date Time Provider Department Center   10/4/2022 10:20 AM Elliott Wasserman MD [facility]         Elliott Wasserman MD  Family Medicine  [facility]

## 2022-09-15 NOTE — LETTER
[address] [place] [address]  Phone: [phone]  Fax: [phone]          Return to Work/School    Patient: Storm Hinds  YOB: 1980   Date: 09/15/2022     To Whom It May Concern:     Storm Hinds was in contact with/seen in my office on 09/15/2022. COVID-19 is present in our communities across the [facility].      Storm Hinds has met the criteria for COVID-19 testing based upon symptoms, travel, and/or potential exposure. The test has been completed and is positive. During this time the employee is not able to work and should be quarantined for 5 days and can return to work on 9/20/22.     If you have any questions or concerns, or if I can be of further assistance, please do not hesitate to contact me.     Sincerely,    Elliott Wasserman MD

## 2022-09-24 ENCOUNTER — PATIENT MESSAGE (OUTPATIENT)
Dept: FAMILY MEDICINE | Facility: CLINIC | Age: 42
End: 2022-09-24
Payer: COMMERCIAL

## 2022-09-24 DIAGNOSIS — R20.0 RIGHT LEG NUMBNESS: ICD-10-CM

## 2022-09-24 DIAGNOSIS — R20.0 LEFT ARM NUMBNESS: ICD-10-CM

## 2022-09-27 ENCOUNTER — PATIENT MESSAGE (OUTPATIENT)
Dept: FAMILY MEDICINE | Facility: CLINIC | Age: 42
End: 2022-09-27
Payer: COMMERCIAL

## 2022-09-27 RX ORDER — GABAPENTIN 300 MG/1
300 CAPSULE ORAL 2 TIMES DAILY
Qty: 180 CAPSULE | Refills: 3 | Status: SHIPPED | OUTPATIENT
Start: 2022-09-27 | End: 2022-12-23 | Stop reason: SDUPTHER

## 2022-10-04 ENCOUNTER — OFFICE VISIT (OUTPATIENT)
Dept: FAMILY MEDICINE | Facility: CLINIC | Age: 42
End: 2022-10-04
Payer: COMMERCIAL

## 2022-10-04 VITALS
DIASTOLIC BLOOD PRESSURE: 78 MMHG | HEART RATE: 79 BPM | HEIGHT: 72 IN | BODY MASS INDEX: 26.79 KG/M2 | TEMPERATURE: 98 F | WEIGHT: 197.81 LBS | OXYGEN SATURATION: 98 % | RESPIRATION RATE: 14 BRPM | SYSTOLIC BLOOD PRESSURE: 128 MMHG

## 2022-10-04 DIAGNOSIS — R63.5 WEIGHT GAIN: ICD-10-CM

## 2022-10-04 DIAGNOSIS — G47.33 OSA (OBSTRUCTIVE SLEEP APNEA): Primary | ICD-10-CM

## 2022-10-04 PROCEDURE — 1159F PR MEDICATION LIST DOCUMENTED IN MEDICAL RECORD: ICD-10-PCS | Mod: CPTII,S$GLB,, | Performed by: FAMILY MEDICINE

## 2022-10-04 PROCEDURE — 1159F MED LIST DOCD IN RCRD: CPT | Mod: CPTII,S$GLB,, | Performed by: FAMILY MEDICINE

## 2022-10-04 PROCEDURE — 99214 PR OFFICE/OUTPT VISIT, EST, LEVL IV, 30-39 MIN: ICD-10-PCS | Mod: S$GLB,,, | Performed by: FAMILY MEDICINE

## 2022-10-04 PROCEDURE — 3078F PR MOST RECENT DIASTOLIC BLOOD PRESSURE < 80 MM HG: ICD-10-PCS | Mod: CPTII,S$GLB,, | Performed by: FAMILY MEDICINE

## 2022-10-04 PROCEDURE — 3044F HG A1C LEVEL LT 7.0%: CPT | Mod: CPTII,S$GLB,, | Performed by: FAMILY MEDICINE

## 2022-10-04 PROCEDURE — 3078F DIAST BP <80 MM HG: CPT | Mod: CPTII,S$GLB,, | Performed by: FAMILY MEDICINE

## 2022-10-04 PROCEDURE — 3074F PR MOST RECENT SYSTOLIC BLOOD PRESSURE < 130 MM HG: ICD-10-PCS | Mod: CPTII,S$GLB,, | Performed by: FAMILY MEDICINE

## 2022-10-04 PROCEDURE — 3044F PR MOST RECENT HEMOGLOBIN A1C LEVEL <7.0%: ICD-10-PCS | Mod: CPTII,S$GLB,, | Performed by: FAMILY MEDICINE

## 2022-10-04 PROCEDURE — 1160F PR REVIEW ALL MEDS BY PRESCRIBER/CLIN PHARMACIST DOCUMENTED: ICD-10-PCS | Mod: CPTII,S$GLB,, | Performed by: FAMILY MEDICINE

## 2022-10-04 PROCEDURE — 3074F SYST BP LT 130 MM HG: CPT | Mod: CPTII,S$GLB,, | Performed by: FAMILY MEDICINE

## 2022-10-04 PROCEDURE — 99214 OFFICE O/P EST MOD 30 MIN: CPT | Mod: S$GLB,,, | Performed by: FAMILY MEDICINE

## 2022-10-04 PROCEDURE — 99999 PR PBB SHADOW E&M-EST. PATIENT-LVL IV: ICD-10-PCS | Mod: PBBFAC,,, | Performed by: FAMILY MEDICINE

## 2022-10-04 PROCEDURE — 3008F PR BODY MASS INDEX (BMI) DOCUMENTED: ICD-10-PCS | Mod: CPTII,S$GLB,, | Performed by: FAMILY MEDICINE

## 2022-10-04 PROCEDURE — 3008F BODY MASS INDEX DOCD: CPT | Mod: CPTII,S$GLB,, | Performed by: FAMILY MEDICINE

## 2022-10-04 PROCEDURE — 1160F RVW MEDS BY RX/DR IN RCRD: CPT | Mod: CPTII,S$GLB,, | Performed by: FAMILY MEDICINE

## 2022-10-04 PROCEDURE — 99999 PR PBB SHADOW E&M-EST. PATIENT-LVL IV: CPT | Mod: PBBFAC,,, | Performed by: FAMILY MEDICINE

## 2022-10-04 NOTE — PROGRESS NOTES
[facility] - Clinic Note    Subjective      Mr. Hinds is a 42 y.o. male who presents to clinic for Follow-up (6mth follow up)    Still having weight gain.  Tolerating the Ozempic 0.25 mg weekly.  No side effects.  Has a on started the CPAP machine.  Increasing gabapentin to twice a day has been working well.    PMH Storm has a past medical history of Acid reflux, Depression, Muscle spasm, Pain, and Tremors of nervous system.   PSXH Storm has no past surgical history on file.   FH Storm's family history includes Cancer in his father; Hypertension in his mother, paternal grandfather, and paternal grandmother; Stroke in his mother.   SH Storm reports that he has quit smoking. He has quit using smokeless tobacco. He reports that he does not drink alcohol and does not use drugs.   ALG Storm has No Known Allergies.   MED Storm has a current medication list which includes the following prescription(s): azelastine, baclofen, carbidopa-levodopa  mg, diclofenac, gabapentin, lorazepam, and semaglutide.     Review of Systems   Constitutional:  Negative for chills and fever.   HENT:  Negative for dental problem.    Respiratory:  Negative for shortness of breath.    Cardiovascular:  Negative for chest pain.   Musculoskeletal:  Positive for myalgias.   Psychiatric/Behavioral:  Negative for sleep disturbance.    Objective     /78 (BP Location: Right arm, Patient Position: Sitting, BP Method: Large (Automatic))   Pulse 79   Temp 98.3 °F (36.8 °C) (Temporal)   Resp 14   Ht 6' (1.829 m)   Wt 89.7 kg (197 lb 12.8 oz)   SpO2 98%   BMI 26.83 kg/m²     Physical Exam  Vitals and nursing note reviewed.   Constitutional:       General: He is not in acute distress.     Appearance: He is well-developed. He is not diaphoretic.   HENT:      Head: Normocephalic and atraumatic.      Right Ear: External ear normal.      Left Ear: External ear normal.   Eyes:      General:         Right eye: No discharge.         Left eye: No  discharge.   Cardiovascular:      Rate and Rhythm: Normal rate and regular rhythm.      Heart sounds: Normal heart sounds.   Pulmonary:      Effort: Pulmonary effort is normal.      Breath sounds: Normal breath sounds. No wheezing or rales.   Skin:     General: Skin is warm and dry.   Neurological:      Mental Status: He is alert and oriented to person, place, and time.   Psychiatric:         Behavior: Behavior normal.         Thought Content: Thought content normal.         Judgment: Judgment normal.      Assessment/Plan     1. YEVGENIY (obstructive sleep apnea)  CPAP FOR HOME USE      2. Weight gain  semaglutide (OZEMPIC) 0.25 mg or 0.5 mg(2 mg/1.5 mL) pen injector        Start CPAP.  Order has been placed.  Increase Ozempic to 0.5 mg weekly.  Follow-up in 6 months.    Future Appointments   Date Time Provider Department Center   4/4/2023  8:20 AM Elliott Wasserman MD [facility]         Elliott Wasserman MD  Family Medicine  [facility]

## 2022-12-14 ENCOUNTER — OFFICE VISIT (OUTPATIENT)
Dept: FAMILY MEDICINE | Facility: CLINIC | Age: 42
End: 2022-12-14
Payer: COMMERCIAL

## 2022-12-14 VITALS
WEIGHT: 201.63 LBS | HEIGHT: 72 IN | BODY MASS INDEX: 27.31 KG/M2 | DIASTOLIC BLOOD PRESSURE: 76 MMHG | RESPIRATION RATE: 14 BRPM | SYSTOLIC BLOOD PRESSURE: 134 MMHG | HEART RATE: 67 BPM | OXYGEN SATURATION: 98 % | TEMPERATURE: 98 F

## 2022-12-14 DIAGNOSIS — M79.10 MUSCLE PAIN: ICD-10-CM

## 2022-12-14 DIAGNOSIS — G24.9 DYSTONIA: Primary | ICD-10-CM

## 2022-12-14 PROCEDURE — 3044F PR MOST RECENT HEMOGLOBIN A1C LEVEL <7.0%: ICD-10-PCS | Mod: CPTII,S$GLB,, | Performed by: FAMILY MEDICINE

## 2022-12-14 PROCEDURE — 3078F PR MOST RECENT DIASTOLIC BLOOD PRESSURE < 80 MM HG: ICD-10-PCS | Mod: CPTII,S$GLB,, | Performed by: FAMILY MEDICINE

## 2022-12-14 PROCEDURE — 3075F SYST BP GE 130 - 139MM HG: CPT | Mod: CPTII,S$GLB,, | Performed by: FAMILY MEDICINE

## 2022-12-14 PROCEDURE — 99214 OFFICE O/P EST MOD 30 MIN: CPT | Mod: S$GLB,,, | Performed by: FAMILY MEDICINE

## 2022-12-14 PROCEDURE — 1159F MED LIST DOCD IN RCRD: CPT | Mod: CPTII,S$GLB,, | Performed by: FAMILY MEDICINE

## 2022-12-14 PROCEDURE — 3044F HG A1C LEVEL LT 7.0%: CPT | Mod: CPTII,S$GLB,, | Performed by: FAMILY MEDICINE

## 2022-12-14 PROCEDURE — 99999 PR PBB SHADOW E&M-EST. PATIENT-LVL III: ICD-10-PCS | Mod: PBBFAC,,, | Performed by: FAMILY MEDICINE

## 2022-12-14 PROCEDURE — 3078F DIAST BP <80 MM HG: CPT | Mod: CPTII,S$GLB,, | Performed by: FAMILY MEDICINE

## 2022-12-14 PROCEDURE — 3008F PR BODY MASS INDEX (BMI) DOCUMENTED: ICD-10-PCS | Mod: CPTII,S$GLB,, | Performed by: FAMILY MEDICINE

## 2022-12-14 PROCEDURE — 1160F RVW MEDS BY RX/DR IN RCRD: CPT | Mod: CPTII,S$GLB,, | Performed by: FAMILY MEDICINE

## 2022-12-14 PROCEDURE — 99214 PR OFFICE/OUTPT VISIT, EST, LEVL IV, 30-39 MIN: ICD-10-PCS | Mod: S$GLB,,, | Performed by: FAMILY MEDICINE

## 2022-12-14 PROCEDURE — 3075F PR MOST RECENT SYSTOLIC BLOOD PRESS GE 130-139MM HG: ICD-10-PCS | Mod: CPTII,S$GLB,, | Performed by: FAMILY MEDICINE

## 2022-12-14 PROCEDURE — 99999 PR PBB SHADOW E&M-EST. PATIENT-LVL III: CPT | Mod: PBBFAC,,, | Performed by: FAMILY MEDICINE

## 2022-12-14 PROCEDURE — 1160F PR REVIEW ALL MEDS BY PRESCRIBER/CLIN PHARMACIST DOCUMENTED: ICD-10-PCS | Mod: CPTII,S$GLB,, | Performed by: FAMILY MEDICINE

## 2022-12-14 PROCEDURE — 3008F BODY MASS INDEX DOCD: CPT | Mod: CPTII,S$GLB,, | Performed by: FAMILY MEDICINE

## 2022-12-14 PROCEDURE — 1159F PR MEDICATION LIST DOCUMENTED IN MEDICAL RECORD: ICD-10-PCS | Mod: CPTII,S$GLB,, | Performed by: FAMILY MEDICINE

## 2022-12-14 RX ORDER — MODAFINIL 100 MG/1
200 TABLET ORAL DAILY
COMMUNITY
End: 2022-12-23 | Stop reason: SDUPTHER

## 2022-12-14 RX ORDER — CELECOXIB 200 MG/1
200 CAPSULE ORAL 2 TIMES DAILY PRN
Qty: 180 CAPSULE | Refills: 3 | Status: SHIPPED | OUTPATIENT
Start: 2022-12-14 | End: 2022-12-23 | Stop reason: SDUPTHER

## 2022-12-14 NOTE — PROGRESS NOTES
[facility] - Clinic Note    Subjective      Mr. Hinds is a 42 y.o. male who presents to clinic for Follow-up    Ozempic was not helpful.  Anti-inflammatory is no longer working as well as it did before.  Going to see an ENT.  Trying Provigil now.    PMH Storm has a past medical history of Acid reflux, Depression, Muscle spasm, Pain, and Tremors of nervous system.   PSXH Storm has no past surgical history on file.   FH Storm's family history includes Cancer in his father; Hypertension in his mother, paternal grandfather, and paternal grandmother; Stroke in his mother.   SH Storm reports that he has quit smoking. He has quit using smokeless tobacco. He reports that he does not drink alcohol and does not use drugs.   ALG Storm has No Known Allergies.   MED Storm has a current medication list which includes the following prescription(s): baclofen, carbidopa-levodopa  mg, gabapentin, modafinil, azelastine, celecoxib, and lorazepam.     Review of Systems   Constitutional:  Negative for chills and fever.   HENT:  Negative for dental problem.    Respiratory:  Negative for shortness of breath.    Cardiovascular:  Negative for chest pain.   Musculoskeletal:  Positive for myalgias.   Psychiatric/Behavioral:  Negative for sleep disturbance.    Objective     /76 (BP Location: Left arm, Patient Position: Sitting, BP Method: Large (Manual))   Pulse 67   Temp 97.8 °F (36.6 °C) (Temporal)   Resp 14   Ht 6' (1.829 m)   Wt 91.4 kg (201 lb 9.6 oz)   SpO2 98%   BMI 27.34 kg/m²     Physical Exam  Vitals and nursing note reviewed.   Constitutional:       General: He is not in acute distress.     Appearance: He is well-developed. He is not diaphoretic.   HENT:      Head: Normocephalic and atraumatic.      Right Ear: External ear normal.      Left Ear: External ear normal.   Eyes:      General:         Right eye: No discharge.         Left eye: No discharge.   Cardiovascular:      Rate and Rhythm: Normal rate and  regular rhythm.      Heart sounds: Normal heart sounds.   Pulmonary:      Effort: Pulmonary effort is normal.      Breath sounds: Normal breath sounds. No wheezing or rales.   Skin:     General: Skin is warm and dry.   Neurological:      Mental Status: He is alert and oriented to person, place, and time.   Psychiatric:         Behavior: Behavior normal.         Thought Content: Thought content normal.         Judgment: Judgment normal.      Assessment/Plan     1. Dystonia        2. Muscle pain  celecoxib (CELEBREX) 200 MG capsule        Switch to Celebrex.  Continue other medications as prescribed.  Follow-up in 3 months.    Future Appointments   Date Time Provider Department Center   3/14/2023  9:20 AM Elliott Wasserman MD [facility]         Elliott Wasserman MD  Family Medicine  [facility]

## 2022-12-23 ENCOUNTER — PATIENT MESSAGE (OUTPATIENT)
Dept: FAMILY MEDICINE | Facility: CLINIC | Age: 42
End: 2022-12-23
Payer: COMMERCIAL

## 2022-12-23 DIAGNOSIS — R20.0 LEFT ARM NUMBNESS: ICD-10-CM

## 2022-12-23 DIAGNOSIS — R09.81 NASAL CONGESTION: ICD-10-CM

## 2022-12-23 DIAGNOSIS — M79.10 MUSCLE PAIN: ICD-10-CM

## 2022-12-23 DIAGNOSIS — G24.9 DYSTONIA: ICD-10-CM

## 2022-12-23 DIAGNOSIS — R20.0 RIGHT LEG NUMBNESS: ICD-10-CM

## 2022-12-23 RX ORDER — GABAPENTIN 300 MG/1
300 CAPSULE ORAL 2 TIMES DAILY
Qty: 180 CAPSULE | Refills: 0 | Status: SHIPPED | OUTPATIENT
Start: 2022-12-23 | End: 2023-01-23 | Stop reason: SDUPTHER

## 2022-12-23 RX ORDER — CARBIDOPA AND LEVODOPA 25; 100 MG/1; MG/1
2 TABLET ORAL 3 TIMES DAILY
Qty: 180 TABLET | Refills: 0 | Status: SHIPPED | OUTPATIENT
Start: 2022-12-23 | End: 2023-01-23 | Stop reason: SDUPTHER

## 2022-12-23 RX ORDER — CELECOXIB 200 MG/1
200 CAPSULE ORAL 2 TIMES DAILY PRN
Qty: 180 CAPSULE | Refills: 0 | Status: SHIPPED | OUTPATIENT
Start: 2022-12-23 | End: 2023-01-27

## 2022-12-23 RX ORDER — BACLOFEN 20 MG/1
20 TABLET ORAL 3 TIMES DAILY
Qty: 90 TABLET | Refills: 0 | Status: SHIPPED | OUTPATIENT
Start: 2022-12-23 | End: 2023-01-23 | Stop reason: SDUPTHER

## 2022-12-23 RX ORDER — MODAFINIL 100 MG/1
200 TABLET ORAL DAILY
Qty: 30 TABLET | Refills: 0 | Status: SHIPPED | OUTPATIENT
Start: 2022-12-23 | End: 2023-01-23 | Stop reason: SDUPTHER

## 2023-01-22 ENCOUNTER — PATIENT MESSAGE (OUTPATIENT)
Dept: FAMILY MEDICINE | Facility: CLINIC | Age: 43
End: 2023-01-22
Payer: COMMERCIAL

## 2023-01-27 ENCOUNTER — OFFICE VISIT (OUTPATIENT)
Dept: FAMILY MEDICINE | Facility: CLINIC | Age: 43
End: 2023-01-27
Payer: COMMERCIAL

## 2023-01-27 DIAGNOSIS — G95.9 MYELOPATHY: ICD-10-CM

## 2023-01-27 DIAGNOSIS — G89.29 BILATERAL CHRONIC KNEE PAIN: Primary | ICD-10-CM

## 2023-01-27 DIAGNOSIS — M25.561 BILATERAL CHRONIC KNEE PAIN: Primary | ICD-10-CM

## 2023-01-27 DIAGNOSIS — F32.1 CURRENT MODERATE EPISODE OF MAJOR DEPRESSIVE DISORDER WITHOUT PRIOR EPISODE: ICD-10-CM

## 2023-01-27 DIAGNOSIS — M25.562 BILATERAL CHRONIC KNEE PAIN: Primary | ICD-10-CM

## 2023-01-27 PROBLEM — G20.A1 PARKINSONS DISEASE: Status: RESOLVED | Noted: 2022-09-15 | Resolved: 2023-01-27

## 2023-01-27 PROCEDURE — 1159F PR MEDICATION LIST DOCUMENTED IN MEDICAL RECORD: ICD-10-PCS | Mod: CPTII,95,, | Performed by: FAMILY MEDICINE

## 2023-01-27 PROCEDURE — 1160F PR REVIEW ALL MEDS BY PRESCRIBER/CLIN PHARMACIST DOCUMENTED: ICD-10-PCS | Mod: CPTII,95,, | Performed by: FAMILY MEDICINE

## 2023-01-27 PROCEDURE — 99214 PR OFFICE/OUTPT VISIT, EST, LEVL IV, 30-39 MIN: ICD-10-PCS | Mod: 95,,, | Performed by: FAMILY MEDICINE

## 2023-01-27 PROCEDURE — 1160F RVW MEDS BY RX/DR IN RCRD: CPT | Mod: CPTII,95,, | Performed by: FAMILY MEDICINE

## 2023-01-27 PROCEDURE — 1159F MED LIST DOCD IN RCRD: CPT | Mod: CPTII,95,, | Performed by: FAMILY MEDICINE

## 2023-01-27 PROCEDURE — 99214 OFFICE O/P EST MOD 30 MIN: CPT | Mod: 95,,, | Performed by: FAMILY MEDICINE

## 2023-01-27 RX ORDER — DICLOFENAC SODIUM 75 MG/1
75 TABLET, DELAYED RELEASE ORAL 2 TIMES DAILY PRN
Qty: 60 TABLET | Refills: 2 | Status: SHIPPED | OUTPATIENT
Start: 2023-01-27 | End: 2023-04-24

## 2023-01-27 RX ORDER — TRAMADOL HYDROCHLORIDE 50 MG/1
50 TABLET ORAL EVERY 12 HOURS PRN
Qty: 30 TABLET | Refills: 0 | Status: SHIPPED | OUTPATIENT
Start: 2023-01-27 | End: 2023-02-27 | Stop reason: SDUPTHER

## 2023-01-28 NOTE — PROGRESS NOTES
[facility] - Telemedicine Note    Subjective      Mr. Hinds is a 42 y.o. male who presents for Knee Pain    The patient location is: home  Visit type: Virtual visit with synchronous audio and video  Total time spent with patient: 10 minutes  Each patient to whom he or she provides medical services by telemedicine is:  (1) informed of the relationship between the physician and patient and the respective role of any other health care provider with respect to management of the patient; and (2) notified that he or she may decline to receive medical services by telemedicine and may withdraw from such care at any time.    Having significant back pain and knee pain. Celebrex is no longer helpful.    PMH Storm has a past medical history of Acid reflux, Depression, Muscle spasm, Pain, and Tremors of nervous system.   PSXH Storm has no past surgical history on file.   FH Storm's family history includes Cancer in his father; Hypertension in his mother, paternal grandfather, and paternal grandmother; Stroke in his mother.   SH Storm reports that he has quit smoking. He has quit using smokeless tobacco. He reports that he does not drink alcohol and does not use drugs.   ALG Storm has No Known Allergies.   MED Storm has a current medication list which includes the following prescription(s): baclofen, carbidopa-levodopa  mg, diclofenac, gabapentin, modafinil, and tramadol.     Review of Systems   Constitutional:  Negative for activity change and unexpected weight change.   HENT:  Negative for hearing loss, rhinorrhea and trouble swallowing.    Eyes:  Negative for discharge and visual disturbance.   Respiratory:  Negative for chest tightness and wheezing.    Cardiovascular:  Negative for chest pain and palpitations.   Gastrointestinal:  Negative for blood in stool, constipation, diarrhea and vomiting.   Endocrine: Negative for polydipsia and polyuria.   Genitourinary:  Negative for difficulty urinating, hematuria and  urgency.   Musculoskeletal:  Positive for arthralgias. Negative for joint swelling and neck pain.   Neurological:  Negative for weakness and headaches.   Psychiatric/Behavioral:  Negative for confusion and dysphoric mood.    Objective     There were no vitals taken for this visit.    Physical Exam  Constitutional:       General: He is not in acute distress.     Appearance: He is well-developed. He is not diaphoretic.   HENT:      Head: Normocephalic and atraumatic.      Right Ear: External ear normal.      Left Ear: External ear normal.   Eyes:      General:         Right eye: No discharge.         Left eye: No discharge.   Pulmonary:      Effort: Pulmonary effort is normal.   Neurological:      Mental Status: He is alert and oriented to person, place, and time. Mental status is at baseline.   Psychiatric:         Mood and Affect: Mood normal.         Behavior: Behavior normal.         Thought Content: Thought content normal.         Judgment: Judgment normal.      Assessment/Plan     1. Bilateral chronic knee pain  X-Ray Knee 3 View Bilateral    diclofenac (VOLTAREN) 75 MG EC tablet    traMADoL (ULTRAM) 50 mg tablet      2. Current moderate episode of major depressive disorder without prior episode        3. Myelopathy          Check xray of the knee. Switch to diclofenac with tramadol as needed for breakthrough pain.  reviewed. No red flags. F/u in 3 months.    Future Appointments   Date Time Provider Department Center   4/5/2023 10:00 AM Elliott Wasserman MD [facility]         Elliott Wasserman MD  Family Medicine  [facility]

## 2023-04-12 DIAGNOSIS — I10 ESSENTIAL HYPERTENSION: ICD-10-CM

## 2023-04-20 ENCOUNTER — LAB VISIT (OUTPATIENT)
Dept: LAB | Facility: HOSPITAL | Age: 43
End: 2023-04-20
Attending: FAMILY MEDICINE
Payer: COMMERCIAL

## 2023-04-20 ENCOUNTER — OFFICE VISIT (OUTPATIENT)
Dept: FAMILY MEDICINE | Facility: CLINIC | Age: 43
End: 2023-04-20
Payer: COMMERCIAL

## 2023-04-20 VITALS
BODY MASS INDEX: 27.9 KG/M2 | HEART RATE: 73 BPM | DIASTOLIC BLOOD PRESSURE: 70 MMHG | TEMPERATURE: 98 F | OXYGEN SATURATION: 97 % | WEIGHT: 206 LBS | HEIGHT: 72 IN | SYSTOLIC BLOOD PRESSURE: 124 MMHG | RESPIRATION RATE: 16 BRPM

## 2023-04-20 DIAGNOSIS — Z00.00 ANNUAL PHYSICAL EXAM: Primary | ICD-10-CM

## 2023-04-20 DIAGNOSIS — Z00.00 ANNUAL PHYSICAL EXAM: ICD-10-CM

## 2023-04-20 DIAGNOSIS — G89.29 BILATERAL CHRONIC KNEE PAIN: ICD-10-CM

## 2023-04-20 DIAGNOSIS — M25.561 BILATERAL CHRONIC KNEE PAIN: ICD-10-CM

## 2023-04-20 DIAGNOSIS — M25.562 BILATERAL CHRONIC KNEE PAIN: ICD-10-CM

## 2023-04-20 LAB
ALBUMIN SERPL BCP-MCNC: 3.9 G/DL (ref 3.5–5.2)
ALP SERPL-CCNC: 73 U/L (ref 55–135)
ALT SERPL W/O P-5'-P-CCNC: 5 U/L (ref 10–44)
ANION GAP SERPL CALC-SCNC: 9 MMOL/L (ref 8–16)
AST SERPL-CCNC: 24 U/L (ref 10–40)
BASOPHILS # BLD AUTO: 0.04 K/UL (ref 0–0.2)
BASOPHILS NFR BLD: 0.6 % (ref 0–1.9)
BILIRUB SERPL-MCNC: 0.3 MG/DL (ref 0.1–1)
BUN SERPL-MCNC: 14 MG/DL (ref 6–20)
CALCIUM SERPL-MCNC: 9.2 MG/DL (ref 8.7–10.5)
CHLORIDE SERPL-SCNC: 108 MMOL/L (ref 95–110)
CHOLEST SERPL-MCNC: 205 MG/DL (ref 120–199)
CHOLEST/HDLC SERPL: 4.6 {RATIO} (ref 2–5)
CO2 SERPL-SCNC: 25 MMOL/L (ref 23–29)
CREAT SERPL-MCNC: 1 MG/DL (ref 0.5–1.4)
DIFFERENTIAL METHOD: ABNORMAL
EOSINOPHIL # BLD AUTO: 0.3 K/UL (ref 0–0.5)
EOSINOPHIL NFR BLD: 4.4 % (ref 0–8)
ERYTHROCYTE [DISTWIDTH] IN BLOOD BY AUTOMATED COUNT: 13.2 % (ref 11.5–14.5)
EST. GFR  (NO RACE VARIABLE): >60 ML/MIN/1.73 M^2
ESTIMATED AVG GLUCOSE: 114 MG/DL (ref 68–131)
GLUCOSE SERPL-MCNC: 90 MG/DL (ref 70–110)
HBA1C MFR BLD: 5.6 % (ref 4–5.6)
HCT VFR BLD AUTO: 37.8 % (ref 40–54)
HDLC SERPL-MCNC: 45 MG/DL (ref 40–75)
HDLC SERPL: 22 % (ref 20–50)
HGB BLD-MCNC: 13 G/DL (ref 14–18)
IMM GRANULOCYTES # BLD AUTO: 0.01 K/UL (ref 0–0.04)
IMM GRANULOCYTES NFR BLD AUTO: 0.1 % (ref 0–0.5)
LDLC SERPL CALC-MCNC: 114.6 MG/DL (ref 63–159)
LYMPHOCYTES # BLD AUTO: 2.2 K/UL (ref 1–4.8)
LYMPHOCYTES NFR BLD: 31.5 % (ref 18–48)
MCH RBC QN AUTO: 29.9 PG (ref 27–31)
MCHC RBC AUTO-ENTMCNC: 34.4 G/DL (ref 32–36)
MCV RBC AUTO: 87 FL (ref 82–98)
MONOCYTES # BLD AUTO: 0.5 K/UL (ref 0.3–1)
MONOCYTES NFR BLD: 7.2 % (ref 4–15)
NEUTROPHILS # BLD AUTO: 4 K/UL (ref 1.8–7.7)
NEUTROPHILS NFR BLD: 56.2 % (ref 38–73)
NONHDLC SERPL-MCNC: 160 MG/DL
NRBC BLD-RTO: 0 /100 WBC
PLATELET # BLD AUTO: 283 K/UL (ref 150–450)
PMV BLD AUTO: 8.7 FL (ref 9.2–12.9)
POTASSIUM SERPL-SCNC: 4 MMOL/L (ref 3.5–5.1)
PROT SERPL-MCNC: 7.1 G/DL (ref 6–8.4)
RBC # BLD AUTO: 4.35 M/UL (ref 4.6–6.2)
SODIUM SERPL-SCNC: 142 MMOL/L (ref 136–145)
TRIGL SERPL-MCNC: 227 MG/DL (ref 30–150)
TSH SERPL DL<=0.005 MIU/L-ACNC: 0.5 UIU/ML (ref 0.4–4)
WBC # BLD AUTO: 7.04 K/UL (ref 3.9–12.7)

## 2023-04-20 PROCEDURE — 80061 LIPID PANEL: CPT | Performed by: FAMILY MEDICINE

## 2023-04-20 PROCEDURE — 3078F DIAST BP <80 MM HG: CPT | Mod: CPTII,S$GLB,, | Performed by: FAMILY MEDICINE

## 2023-04-20 PROCEDURE — 36415 COLL VENOUS BLD VENIPUNCTURE: CPT | Performed by: FAMILY MEDICINE

## 2023-04-20 PROCEDURE — 3078F PR MOST RECENT DIASTOLIC BLOOD PRESSURE < 80 MM HG: ICD-10-PCS | Mod: CPTII,S$GLB,, | Performed by: FAMILY MEDICINE

## 2023-04-20 PROCEDURE — 3074F SYST BP LT 130 MM HG: CPT | Mod: CPTII,S$GLB,, | Performed by: FAMILY MEDICINE

## 2023-04-20 PROCEDURE — 99396 PREV VISIT EST AGE 40-64: CPT | Mod: S$GLB,,, | Performed by: FAMILY MEDICINE

## 2023-04-20 PROCEDURE — 3008F PR BODY MASS INDEX (BMI) DOCUMENTED: ICD-10-PCS | Mod: CPTII,S$GLB,, | Performed by: FAMILY MEDICINE

## 2023-04-20 PROCEDURE — 84443 ASSAY THYROID STIM HORMONE: CPT | Performed by: FAMILY MEDICINE

## 2023-04-20 PROCEDURE — 1160F PR REVIEW ALL MEDS BY PRESCRIBER/CLIN PHARMACIST DOCUMENTED: ICD-10-PCS | Mod: CPTII,S$GLB,, | Performed by: FAMILY MEDICINE

## 2023-04-20 PROCEDURE — 1159F PR MEDICATION LIST DOCUMENTED IN MEDICAL RECORD: ICD-10-PCS | Mod: CPTII,S$GLB,, | Performed by: FAMILY MEDICINE

## 2023-04-20 PROCEDURE — 1160F RVW MEDS BY RX/DR IN RCRD: CPT | Mod: CPTII,S$GLB,, | Performed by: FAMILY MEDICINE

## 2023-04-20 PROCEDURE — 99999 PR PBB SHADOW E&M-EST. PATIENT-LVL III: CPT | Mod: PBBFAC,,, | Performed by: FAMILY MEDICINE

## 2023-04-20 PROCEDURE — 99396 PR PREVENTIVE VISIT,EST,40-64: ICD-10-PCS | Mod: S$GLB,,, | Performed by: FAMILY MEDICINE

## 2023-04-20 PROCEDURE — 83036 HEMOGLOBIN GLYCOSYLATED A1C: CPT | Performed by: FAMILY MEDICINE

## 2023-04-20 PROCEDURE — 80053 COMPREHEN METABOLIC PANEL: CPT | Performed by: FAMILY MEDICINE

## 2023-04-20 PROCEDURE — 99999 PR PBB SHADOW E&M-EST. PATIENT-LVL III: ICD-10-PCS | Mod: PBBFAC,,, | Performed by: FAMILY MEDICINE

## 2023-04-20 PROCEDURE — 3008F BODY MASS INDEX DOCD: CPT | Mod: CPTII,S$GLB,, | Performed by: FAMILY MEDICINE

## 2023-04-20 PROCEDURE — 3074F PR MOST RECENT SYSTOLIC BLOOD PRESSURE < 130 MM HG: ICD-10-PCS | Mod: CPTII,S$GLB,, | Performed by: FAMILY MEDICINE

## 2023-04-20 PROCEDURE — 1159F MED LIST DOCD IN RCRD: CPT | Mod: CPTII,S$GLB,, | Performed by: FAMILY MEDICINE

## 2023-04-20 PROCEDURE — 85025 COMPLETE CBC W/AUTO DIFF WBC: CPT | Performed by: FAMILY MEDICINE

## 2023-04-20 NOTE — PROGRESS NOTES
[facility] - Clinic Note    Subjective      Mr. Hinds is a 43 y.o. male who presents to clinic for Follow-up (3mth follow up)    Things have been stable.    PMH Storm has a past medical history of Acid reflux, Depression, Muscle spasm, Pain, and Tremors of nervous system.   PSXH Storm has no past surgical history on file.   FH Storm's family history includes Cancer in his father; Hypertension in his mother, paternal grandfather, and paternal grandmother; Stroke in his mother.   SH Storm reports that he has quit smoking. He has quit using smokeless tobacco. He reports that he does not drink alcohol and does not use drugs.   ALG Storm has No Known Allergies.   MED Storm has a current medication list which includes the following prescription(s): baclofen, carbidopa-levodopa  mg, diclofenac, gabapentin, modafinil, and tramadol.     Review of Systems   Constitutional:  Negative for chills and fever.   HENT:  Negative for dental problem.    Respiratory:  Negative for shortness of breath.    Cardiovascular:  Negative for chest pain.   Musculoskeletal:  Positive for myalgias.   Psychiatric/Behavioral:  Negative for sleep disturbance.    Objective     /70 (BP Location: Right arm, Patient Position: Sitting, BP Method: Large (Manual))   Pulse 73   Temp 98.2 °F (36.8 °C) (Temporal)   Resp 16   Ht 6' (1.829 m)   Wt 93.4 kg (206 lb)   SpO2 97%   BMI 27.94 kg/m²     Physical Exam  Vitals and nursing note reviewed.   Constitutional:       General: He is not in acute distress.     Appearance: He is well-developed. He is not diaphoretic.   HENT:      Head: Normocephalic and atraumatic.      Right Ear: External ear normal.      Left Ear: External ear normal.   Eyes:      General:         Right eye: No discharge.         Left eye: No discharge.   Cardiovascular:      Rate and Rhythm: Normal rate and regular rhythm.      Heart sounds: Normal heart sounds.   Pulmonary:      Effort: Pulmonary effort is normal.       Breath sounds: Normal breath sounds. No wheezing or rales.   Skin:     General: Skin is warm and dry.   Neurological:      Mental Status: He is alert and oriented to person, place, and time.   Psychiatric:         Behavior: Behavior normal.         Thought Content: Thought content normal.         Judgment: Judgment normal.      Assessment/Plan     1. Annual physical exam  Lipid Panel    Comprehensive Metabolic Panel    CBC Auto Differential    Hemoglobin A1C    TSH      2. Bilateral chronic knee pain          Due for annual labs as above.  Continue current medications as prescribed.  Follow-up 3 months.    Future Appointments   Date Time Provider Department Center   7/25/2023  9:20 AM Elliott Wasserman MD [facility]         Elliott Wasserman MD  Family Medicine  [facility]

## 2023-07-04 ENCOUNTER — LAB VISIT (OUTPATIENT)
Dept: LAB | Facility: HOSPITAL | Age: 43
End: 2023-07-04
Attending: FAMILY MEDICINE
Payer: COMMERCIAL

## 2023-07-04 DIAGNOSIS — I10 ESSENTIAL HYPERTENSION: ICD-10-CM

## 2023-07-04 LAB
ALBUMIN SERPL BCP-MCNC: 4.1 G/DL (ref 3.5–5.2)
ALP SERPL-CCNC: 64 U/L (ref 55–135)
ALT SERPL W/O P-5'-P-CCNC: 5 U/L (ref 10–44)
ANION GAP SERPL CALC-SCNC: 8 MMOL/L (ref 8–16)
AST SERPL-CCNC: 23 U/L (ref 10–40)
BILIRUB SERPL-MCNC: 0.5 MG/DL (ref 0.1–1)
BUN SERPL-MCNC: 15 MG/DL (ref 6–20)
CALCIUM SERPL-MCNC: 9.6 MG/DL (ref 8.7–10.5)
CHLORIDE SERPL-SCNC: 103 MMOL/L (ref 95–110)
CO2 SERPL-SCNC: 28 MMOL/L (ref 23–29)
CREAT SERPL-MCNC: 1 MG/DL (ref 0.5–1.4)
EST. GFR  (NO RACE VARIABLE): >60 ML/MIN/1.73 M^2
GLUCOSE SERPL-MCNC: 102 MG/DL (ref 70–110)
POTASSIUM SERPL-SCNC: 4.4 MMOL/L (ref 3.5–5.1)
PROT SERPL-MCNC: 7.3 G/DL (ref 6–8.4)
SODIUM SERPL-SCNC: 139 MMOL/L (ref 136–145)

## 2023-07-04 PROCEDURE — 80053 COMPREHEN METABOLIC PANEL: CPT | Performed by: FAMILY MEDICINE

## 2023-07-04 PROCEDURE — 36415 COLL VENOUS BLD VENIPUNCTURE: CPT | Performed by: FAMILY MEDICINE

## 2023-07-07 DIAGNOSIS — M25.561 BILATERAL CHRONIC KNEE PAIN: ICD-10-CM

## 2023-07-07 DIAGNOSIS — G89.29 BILATERAL CHRONIC KNEE PAIN: ICD-10-CM

## 2023-07-07 DIAGNOSIS — M25.562 BILATERAL CHRONIC KNEE PAIN: ICD-10-CM

## 2023-07-07 RX ORDER — DICLOFENAC SODIUM 75 MG/1
75 TABLET, DELAYED RELEASE ORAL 2 TIMES DAILY PRN
Qty: 60 TABLET | Refills: 0 | Status: SHIPPED | OUTPATIENT
Start: 2023-07-07 | End: 2023-09-11 | Stop reason: SDUPTHER

## 2023-07-25 ENCOUNTER — OFFICE VISIT (OUTPATIENT)
Dept: FAMILY MEDICINE | Facility: CLINIC | Age: 43
End: 2023-07-25
Payer: COMMERCIAL

## 2023-07-25 VITALS
DIASTOLIC BLOOD PRESSURE: 74 MMHG | TEMPERATURE: 98 F | WEIGHT: 204.81 LBS | HEART RATE: 77 BPM | BODY MASS INDEX: 27.74 KG/M2 | HEIGHT: 72 IN | SYSTOLIC BLOOD PRESSURE: 124 MMHG | RESPIRATION RATE: 16 BRPM | OXYGEN SATURATION: 97 %

## 2023-07-25 DIAGNOSIS — M25.50 ARTHRALGIA OF MULTIPLE JOINTS: ICD-10-CM

## 2023-07-25 DIAGNOSIS — G24.9 DYSTONIA: Primary | ICD-10-CM

## 2023-07-25 DIAGNOSIS — M79.10 MUSCLE PAIN: ICD-10-CM

## 2023-07-25 PROCEDURE — 1159F PR MEDICATION LIST DOCUMENTED IN MEDICAL RECORD: ICD-10-PCS | Mod: CPTII,S$GLB,, | Performed by: FAMILY MEDICINE

## 2023-07-25 PROCEDURE — 99214 PR OFFICE/OUTPT VISIT, EST, LEVL IV, 30-39 MIN: ICD-10-PCS | Mod: S$GLB,,, | Performed by: FAMILY MEDICINE

## 2023-07-25 PROCEDURE — 3078F DIAST BP <80 MM HG: CPT | Mod: CPTII,S$GLB,, | Performed by: FAMILY MEDICINE

## 2023-07-25 PROCEDURE — 3074F SYST BP LT 130 MM HG: CPT | Mod: CPTII,S$GLB,, | Performed by: FAMILY MEDICINE

## 2023-07-25 PROCEDURE — 3008F BODY MASS INDEX DOCD: CPT | Mod: CPTII,S$GLB,, | Performed by: FAMILY MEDICINE

## 2023-07-25 PROCEDURE — 3044F PR MOST RECENT HEMOGLOBIN A1C LEVEL <7.0%: ICD-10-PCS | Mod: CPTII,S$GLB,, | Performed by: FAMILY MEDICINE

## 2023-07-25 PROCEDURE — 1159F MED LIST DOCD IN RCRD: CPT | Mod: CPTII,S$GLB,, | Performed by: FAMILY MEDICINE

## 2023-07-25 PROCEDURE — 3078F PR MOST RECENT DIASTOLIC BLOOD PRESSURE < 80 MM HG: ICD-10-PCS | Mod: CPTII,S$GLB,, | Performed by: FAMILY MEDICINE

## 2023-07-25 PROCEDURE — 1160F RVW MEDS BY RX/DR IN RCRD: CPT | Mod: CPTII,S$GLB,, | Performed by: FAMILY MEDICINE

## 2023-07-25 PROCEDURE — 1160F PR REVIEW ALL MEDS BY PRESCRIBER/CLIN PHARMACIST DOCUMENTED: ICD-10-PCS | Mod: CPTII,S$GLB,, | Performed by: FAMILY MEDICINE

## 2023-07-25 PROCEDURE — 3044F HG A1C LEVEL LT 7.0%: CPT | Mod: CPTII,S$GLB,, | Performed by: FAMILY MEDICINE

## 2023-07-25 PROCEDURE — 99999 PR PBB SHADOW E&M-EST. PATIENT-LVL IV: CPT | Mod: PBBFAC,,, | Performed by: FAMILY MEDICINE

## 2023-07-25 PROCEDURE — 3074F PR MOST RECENT SYSTOLIC BLOOD PRESSURE < 130 MM HG: ICD-10-PCS | Mod: CPTII,S$GLB,, | Performed by: FAMILY MEDICINE

## 2023-07-25 PROCEDURE — 99999 PR PBB SHADOW E&M-EST. PATIENT-LVL IV: ICD-10-PCS | Mod: PBBFAC,,, | Performed by: FAMILY MEDICINE

## 2023-07-25 PROCEDURE — 3008F PR BODY MASS INDEX (BMI) DOCUMENTED: ICD-10-PCS | Mod: CPTII,S$GLB,, | Performed by: FAMILY MEDICINE

## 2023-07-25 PROCEDURE — 99214 OFFICE O/P EST MOD 30 MIN: CPT | Mod: S$GLB,,, | Performed by: FAMILY MEDICINE

## 2023-07-25 RX ORDER — BACLOFEN 20 MG/1
20 TABLET ORAL 4 TIMES DAILY
Qty: 360 TABLET | Refills: 3 | Status: SHIPPED | OUTPATIENT
Start: 2023-07-25 | End: 2023-10-11 | Stop reason: SDUPTHER

## 2023-07-26 NOTE — PROGRESS NOTES
[facility] - Clinic Note    Subjective      Mr. Hinds is a 43 y.o. male who presents to clinic for Follow-up (3mth follow up)    Has been having or stiffness in the morning.  Also has umbilical hernia but is not bothering him.    PMH Storm has a past medical history of Acid reflux, Depression, Muscle spasm, Pain, and Tremors of nervous system.   PSXH Storm has no past surgical history on file.   FH Storm's family history includes Cancer in his father; Hypertension in his mother, paternal grandfather, and paternal grandmother; Stroke in his mother.   SH Storm reports that he has quit smoking. He has quit using smokeless tobacco. He reports that he does not drink alcohol and does not use drugs.   ALG Storm has No Known Allergies.   MED Storm has a current medication list which includes the following prescription(s): carbidopa-levodopa  mg, diclofenac, gabapentin, modafinil, tramadol, and baclofen.     Review of Systems   Constitutional:  Negative for chills and fever.   HENT:  Negative for dental problem.    Respiratory:  Negative for shortness of breath.    Cardiovascular:  Negative for chest pain.   Musculoskeletal:  Positive for myalgias.   Psychiatric/Behavioral:  Negative for sleep disturbance.    Objective     /74 (BP Location: Left arm, Patient Position: Sitting, BP Method: Large (Manual))   Pulse 77   Temp 98.3 °F (36.8 °C) (Temporal)   Resp 16   Ht 6' (1.829 m)   Wt 92.9 kg (204 lb 12.8 oz)   SpO2 97%   BMI 27.78 kg/m²     Physical Exam  Vitals and nursing note reviewed.   Constitutional:       General: He is not in acute distress.     Appearance: He is well-developed. He is not diaphoretic.   HENT:      Head: Normocephalic and atraumatic.      Right Ear: External ear normal.      Left Ear: External ear normal.   Eyes:      General:         Right eye: No discharge.         Left eye: No discharge.   Cardiovascular:      Rate and Rhythm: Normal rate and regular rhythm.      Heart sounds:  Normal heart sounds.   Pulmonary:      Effort: Pulmonary effort is normal.      Breath sounds: Normal breath sounds. No wheezing or rales.   Skin:     General: Skin is warm and dry.   Neurological:      Mental Status: He is alert and oriented to person, place, and time.   Psychiatric:         Behavior: Behavior normal.         Thought Content: Thought content normal.         Judgment: Judgment normal.      Assessment/Plan     1. Dystonia        2. Arthralgia of multiple joints        3. Muscle pain  baclofen (LIORESAL) 20 MG tablet        Long discussion about medications.  Continue baclofen.  Can trial tramadol in the evening.  Will continue to monitor umbilical hernia.  Warning signs provided to patient.    I spent a total of 33 minutes on the day of the visit.This includes face to face time and non-face to face time preparing to see the patient (eg, review of tests), obtaining and/or reviewing separately obtained history, documenting clinical information in the electronic or other health record, independently interpreting results and communicating results to the patient/family/caregiver, or care coordinator      Future Appointments   Date Time Provider Department Center   10/26/2023  8:40 AM Elliott Wasserman MD [facility]         Elliott Wasserman MD  Family Medicine  [facility]

## 2023-07-29 RX ORDER — MODAFINIL 100 MG/1
100 TABLET ORAL DAILY
Qty: 90 TABLET | Refills: 0 | Status: SHIPPED | OUTPATIENT
Start: 2023-07-29 | End: 2023-10-06 | Stop reason: SDUPTHER

## 2023-08-05 DIAGNOSIS — M25.562 BILATERAL CHRONIC KNEE PAIN: ICD-10-CM

## 2023-08-05 DIAGNOSIS — M25.561 BILATERAL CHRONIC KNEE PAIN: ICD-10-CM

## 2023-08-05 DIAGNOSIS — G89.29 BILATERAL CHRONIC KNEE PAIN: ICD-10-CM

## 2023-08-10 RX ORDER — TRAMADOL HYDROCHLORIDE 50 MG/1
TABLET ORAL
Qty: 30 TABLET | Refills: 0 | Status: SHIPPED | OUTPATIENT
Start: 2023-08-10 | End: 2023-10-03 | Stop reason: SDUPTHER

## 2023-09-11 DIAGNOSIS — M25.562 BILATERAL CHRONIC KNEE PAIN: ICD-10-CM

## 2023-09-11 DIAGNOSIS — M25.561 BILATERAL CHRONIC KNEE PAIN: ICD-10-CM

## 2023-09-11 DIAGNOSIS — G89.29 BILATERAL CHRONIC KNEE PAIN: ICD-10-CM

## 2023-09-12 RX ORDER — DICLOFENAC SODIUM 75 MG/1
75 TABLET, DELAYED RELEASE ORAL 2 TIMES DAILY PRN
Qty: 60 TABLET | Refills: 3 | Status: SHIPPED | OUTPATIENT
Start: 2023-09-12 | End: 2023-10-11 | Stop reason: SDUPTHER

## 2023-10-03 DIAGNOSIS — M25.562 BILATERAL CHRONIC KNEE PAIN: ICD-10-CM

## 2023-10-03 DIAGNOSIS — M25.561 BILATERAL CHRONIC KNEE PAIN: ICD-10-CM

## 2023-10-03 DIAGNOSIS — G89.29 BILATERAL CHRONIC KNEE PAIN: ICD-10-CM

## 2023-10-06 ENCOUNTER — PATIENT MESSAGE (OUTPATIENT)
Dept: FAMILY MEDICINE | Facility: CLINIC | Age: 43
End: 2023-10-06
Payer: COMMERCIAL

## 2023-10-06 DIAGNOSIS — M25.562 BILATERAL CHRONIC KNEE PAIN: ICD-10-CM

## 2023-10-06 DIAGNOSIS — M25.561 BILATERAL CHRONIC KNEE PAIN: ICD-10-CM

## 2023-10-06 DIAGNOSIS — G89.29 BILATERAL CHRONIC KNEE PAIN: ICD-10-CM

## 2023-10-06 DIAGNOSIS — G24.9 DYSTONIA: Primary | ICD-10-CM

## 2023-10-07 RX ORDER — TRAMADOL HYDROCHLORIDE 50 MG/1
50 TABLET ORAL EVERY 12 HOURS PRN
Qty: 30 TABLET | Refills: 0 | Status: SHIPPED | OUTPATIENT
Start: 2023-10-07 | End: 2023-10-11 | Stop reason: SDUPTHER

## 2023-10-07 RX ORDER — TRAMADOL HYDROCHLORIDE 50 MG/1
TABLET ORAL
Qty: 30 TABLET | Refills: 0 | OUTPATIENT
Start: 2023-10-07

## 2023-10-07 RX ORDER — LORAZEPAM 1 MG/1
0.5 TABLET ORAL EVERY 12 HOURS PRN
Qty: 60 TABLET | Refills: 2 | Status: SHIPPED | OUTPATIENT
Start: 2023-10-07

## 2023-10-07 RX ORDER — MODAFINIL 100 MG/1
100 TABLET ORAL DAILY
Qty: 90 TABLET | Refills: 0 | Status: SHIPPED | OUTPATIENT
Start: 2023-10-07 | End: 2024-01-18

## 2023-10-11 ENCOUNTER — OFFICE VISIT (OUTPATIENT)
Dept: FAMILY MEDICINE | Facility: CLINIC | Age: 43
End: 2023-10-11
Payer: COMMERCIAL

## 2023-10-11 VITALS
SYSTOLIC BLOOD PRESSURE: 128 MMHG | BODY MASS INDEX: 28.04 KG/M2 | OXYGEN SATURATION: 97 % | RESPIRATION RATE: 18 BRPM | WEIGHT: 207 LBS | TEMPERATURE: 98 F | DIASTOLIC BLOOD PRESSURE: 78 MMHG | HEART RATE: 94 BPM | HEIGHT: 72 IN

## 2023-10-11 DIAGNOSIS — M25.562 BILATERAL CHRONIC KNEE PAIN: ICD-10-CM

## 2023-10-11 DIAGNOSIS — R20.0 LEFT ARM NUMBNESS: ICD-10-CM

## 2023-10-11 DIAGNOSIS — G89.29 BILATERAL CHRONIC KNEE PAIN: ICD-10-CM

## 2023-10-11 DIAGNOSIS — M79.10 MUSCLE PAIN: ICD-10-CM

## 2023-10-11 DIAGNOSIS — M25.561 BILATERAL CHRONIC KNEE PAIN: ICD-10-CM

## 2023-10-11 DIAGNOSIS — R20.0 RIGHT LEG NUMBNESS: ICD-10-CM

## 2023-10-11 DIAGNOSIS — G24.9 DYSTONIA: ICD-10-CM

## 2023-10-11 PROCEDURE — 3044F PR MOST RECENT HEMOGLOBIN A1C LEVEL <7.0%: ICD-10-PCS | Mod: CPTII,S$GLB,, | Performed by: FAMILY MEDICINE

## 2023-10-11 PROCEDURE — 3074F PR MOST RECENT SYSTOLIC BLOOD PRESSURE < 130 MM HG: ICD-10-PCS | Mod: CPTII,S$GLB,, | Performed by: FAMILY MEDICINE

## 2023-10-11 PROCEDURE — 3008F BODY MASS INDEX DOCD: CPT | Mod: CPTII,S$GLB,, | Performed by: FAMILY MEDICINE

## 2023-10-11 PROCEDURE — 3008F PR BODY MASS INDEX (BMI) DOCUMENTED: ICD-10-PCS | Mod: CPTII,S$GLB,, | Performed by: FAMILY MEDICINE

## 2023-10-11 PROCEDURE — 3078F DIAST BP <80 MM HG: CPT | Mod: CPTII,S$GLB,, | Performed by: FAMILY MEDICINE

## 2023-10-11 PROCEDURE — 99213 PR OFFICE/OUTPT VISIT, EST, LEVL III, 20-29 MIN: ICD-10-PCS | Mod: S$GLB,,, | Performed by: FAMILY MEDICINE

## 2023-10-11 PROCEDURE — 3078F PR MOST RECENT DIASTOLIC BLOOD PRESSURE < 80 MM HG: ICD-10-PCS | Mod: CPTII,S$GLB,, | Performed by: FAMILY MEDICINE

## 2023-10-11 PROCEDURE — 1159F PR MEDICATION LIST DOCUMENTED IN MEDICAL RECORD: ICD-10-PCS | Mod: CPTII,S$GLB,, | Performed by: FAMILY MEDICINE

## 2023-10-11 PROCEDURE — 3044F HG A1C LEVEL LT 7.0%: CPT | Mod: CPTII,S$GLB,, | Performed by: FAMILY MEDICINE

## 2023-10-11 PROCEDURE — 1160F PR REVIEW ALL MEDS BY PRESCRIBER/CLIN PHARMACIST DOCUMENTED: ICD-10-PCS | Mod: CPTII,S$GLB,, | Performed by: FAMILY MEDICINE

## 2023-10-11 PROCEDURE — 99213 OFFICE O/P EST LOW 20 MIN: CPT | Mod: S$GLB,,, | Performed by: FAMILY MEDICINE

## 2023-10-11 PROCEDURE — 3074F SYST BP LT 130 MM HG: CPT | Mod: CPTII,S$GLB,, | Performed by: FAMILY MEDICINE

## 2023-10-11 PROCEDURE — 1160F RVW MEDS BY RX/DR IN RCRD: CPT | Mod: CPTII,S$GLB,, | Performed by: FAMILY MEDICINE

## 2023-10-11 PROCEDURE — 99999 PR PBB SHADOW E&M-EST. PATIENT-LVL III: CPT | Mod: PBBFAC,,, | Performed by: FAMILY MEDICINE

## 2023-10-11 PROCEDURE — 99999 PR PBB SHADOW E&M-EST. PATIENT-LVL III: ICD-10-PCS | Mod: PBBFAC,,, | Performed by: FAMILY MEDICINE

## 2023-10-11 PROCEDURE — 1159F MED LIST DOCD IN RCRD: CPT | Mod: CPTII,S$GLB,, | Performed by: FAMILY MEDICINE

## 2023-10-11 RX ORDER — GABAPENTIN 300 MG/1
300 CAPSULE ORAL 2 TIMES DAILY
Qty: 180 CAPSULE | Refills: 3 | Status: SHIPPED | OUTPATIENT
Start: 2023-10-11 | End: 2024-10-10

## 2023-10-11 RX ORDER — CARBIDOPA AND LEVODOPA 25; 100 MG/1; MG/1
2 TABLET ORAL 3 TIMES DAILY
Qty: 540 TABLET | Refills: 2 | Status: SHIPPED | OUTPATIENT
Start: 2023-10-11

## 2023-10-11 RX ORDER — DICLOFENAC SODIUM 75 MG/1
75 TABLET, DELAYED RELEASE ORAL 2 TIMES DAILY PRN
Qty: 60 TABLET | Refills: 3 | Status: SHIPPED | OUTPATIENT
Start: 2023-10-11

## 2023-10-11 RX ORDER — BACLOFEN 20 MG/1
20 TABLET ORAL 4 TIMES DAILY
Qty: 360 TABLET | Refills: 3 | Status: SHIPPED | OUTPATIENT
Start: 2023-10-11 | End: 2024-10-10

## 2023-10-11 RX ORDER — TRAMADOL HYDROCHLORIDE 50 MG/1
50 TABLET ORAL EVERY 12 HOURS PRN
Qty: 30 TABLET | Refills: 1 | Status: SHIPPED | OUTPATIENT
Start: 2023-11-11

## 2023-10-11 NOTE — PROGRESS NOTES
[facility] - Clinic Note    Subjective      Mr. Hinds is a 43 y.o. male who presents to clinic for Follow-up (refills)    Noticing some slowed reflexes in the evening.  Otherwise medication regimen has been working.    PMH Storm has a past medical history of Acid reflux, Depression, Muscle spasm, Pain, and Tremors of nervous system.   PSXH Storm has no past surgical history on file.   FH Storm's family history includes Cancer in his father; Hypertension in his mother, paternal grandfather, and paternal grandmother; Stroke in his mother.   SH Storm reports that he has quit smoking. He has quit using smokeless tobacco. He reports that he does not drink alcohol and does not use drugs.   ALG Storm has No Known Allergies.   MED Storm has a current medication list which includes the following prescription(s): lorazepam, modafinil, baclofen, carbidopa-levodopa  mg, diclofenac, gabapentin, and [START ON 11/11/2023] tramadol.     Review of Systems   Constitutional:  Negative for chills and fever.   HENT:  Negative for dental problem.    Respiratory:  Negative for shortness of breath.    Cardiovascular:  Negative for chest pain.   Musculoskeletal:  Positive for myalgias.   Psychiatric/Behavioral:  Negative for sleep disturbance.      Objective     /78 (BP Location: Left arm, Patient Position: Sitting, BP Method: Large (Manual))   Pulse 94   Temp 97.8 °F (36.6 °C) (Temporal)   Resp 18   Ht 6' (1.829 m)   Wt 93.9 kg (207 lb)   SpO2 97%   BMI 28.07 kg/m²     Physical Exam  Vitals and nursing note reviewed.   Constitutional:       General: He is not in acute distress.     Appearance: He is well-developed. He is not diaphoretic.   HENT:      Head: Normocephalic and atraumatic.      Right Ear: External ear normal.      Left Ear: External ear normal.   Eyes:      General:         Right eye: No discharge.         Left eye: No discharge.   Cardiovascular:      Rate and Rhythm: Normal rate and regular rhythm.       Heart sounds: Normal heart sounds.   Pulmonary:      Effort: Pulmonary effort is normal.      Breath sounds: Normal breath sounds. No wheezing or rales.   Skin:     General: Skin is warm and dry.   Neurological:      Mental Status: He is alert and oriented to person, place, and time.   Psychiatric:         Behavior: Behavior normal.         Thought Content: Thought content normal.         Judgment: Judgment normal.        Assessment/Plan     1. Bilateral chronic knee pain  traMADoL (ULTRAM) 50 mg tablet    diclofenac (VOLTAREN) 75 MG EC tablet      2. Muscle pain  baclofen (LIORESAL) 20 MG tablet      3. Dystonia  carbidopa-levodopa  mg (SINEMET)  mg per tablet      4. Left arm numbness  gabapentin (NEURONTIN) 300 MG capsule      5. Right leg numbness  gabapentin (NEURONTIN) 300 MG capsule        Long discussion about medications.Continue current medications as prescribed.    Future Appointments   Date Time Provider Department Center   1/18/2024  2:45 PM Arnaldo Vazquez MD [facility]         Elliott Wasserman MD  Family Medicine  [facility]

## 2023-10-18 ENCOUNTER — PATIENT OUTREACH (OUTPATIENT)
Dept: ADMINISTRATIVE | Facility: HOSPITAL | Age: 43
End: 2023-10-18
Payer: COMMERCIAL

## 2023-10-18 NOTE — PROGRESS NOTES
Population Health Chart Review & Patient Outreach Details:     Reason for Outreach Encounter:     []  Non-Compliant Report   [x]  Payor Report ([employer], PHN, BCBS, MSSP, MCIP, UHC, etc.)   []  Pre-Visit Chart Review     Updates Requested / Reviewed:     [x]  Care Everywhere    [x]     [x]  External Sources ([employer], Quest, DIS, etc.)   []  Care Team Updated    Patient Outreach Method:    []  Telephone Outreach Completed   [] Successful   [] Left Voicemail   [] Unable to Contact (wrong number, no voicemail)  []  [employer] Portal Outreach Sent  []  Letter Outreach Mailed  []  Fax Sent for External Records  []  External Records Upload    Health Maintenance Topics Addressed and Outreach Outcomes / Actions Taken:        []      Breast Cancer Screening []  Mammo Scheduled      []  External Records Requested     []  Added Reminder to Complete to Upcoming Primary Care Appt Notes     []  Patient Declined     []  Patient Will Call Back to Schedule     []  Patient Will Schedule with External Provider / Order Routed if Applicable             []       Cervical Cancer Screening []  Pap Scheduled      []  External Records Requested     []  Added Reminder to Complete to Upcoming Primary Care Appt Notes     []  Patient Declined     []  Patient Will Call Back to Schedule     []  Patient Will Schedule with External Provider               []          Colorectal Cancer Screening []  Colonoscopy Case Request or Referral Placed     []  External Records Requested     []  Added Reminder to Complete to Upcoming Primary Care Appt Notes     []  Patient Declined     []  Patient Will Call Back to Schedule     []  Patient Will Schedule with External Provider     []  Fit Kit Mailed (add the SmartPhrase under additional notes)     []  Reminded Patient to Complete Home Test             [x]      Diabetic Eye Exam []  Eye Camera Scheduled or Optometry Referral Placed     []  External Records Requested     []  Added Reminder to Complete to  Upcoming Primary Care Appt Notes     []  Patient Declined     []  Patient Will Call Back to Schedule     []  Patient Will Schedule with External Provider             []      Blood Pressure Control []  Primary Care Follow Up Visit Scheduled     []  Remote Blood Pressure Reading Captured     []  Added Reminder to Complete to Upcoming Primary Care Appt Notes     []  Patient Declined     []  Patient Will Call Back / Patient Will Send Portal Message with Reading     []  Patient Will Call Back to Schedule Provider Visit             [x]       HbA1c & Other Labs []  Lab Appt Scheduled for Due Labs     []  Primary Care Follow Up Visit Scheduled      []  Reminded Patient to Complete Home Test     []  Added Reminder to Complete to Upcoming Primary Care Appt Notes     []  Patient Declined     []  Patient Will Call Back to Schedule     []  Patient Will Schedule with External Provider / Order Routed if Applicable           []    Schedule Primary Care Appt []  Primary Care Appt Scheduled     []  Patient Declined     []  Patient Will Call Back to Schedule     []  Pt Established with External Provider & Updated Care Team             []      Medication Adherence []  Primary Care Appointment Scheduled     []  Added Reminder to Upcoming Primary Care Appt Notes     []  Patient Reminded to  Prescription     []  Patient Declined, Provider Notified if Needed     []  Sent Provider Message to Review and/or Add Exclusion to Problem List             []      Osteoporosis Screening []  DXA Appointment Scheduled     []  External Records Requested     []  Added Reminder to Complete to Upcoming Primary Care Appt Notes     []  Patient Declined     []  Patient Will Call Back to Schedule     []  Patient Will Schedule with External Provider / Order Routed if Applicable     Additional Care Coordinator Notes:     [facility] report. Patient not on diabetes registry and A1C results currently recorded in Epic are 5.5 (4/2022) and 5.6  (4/2023).    Further Action Needed If Patient Returns Outreach:

## 2023-10-19 ENCOUNTER — TELEPHONE (OUTPATIENT)
Dept: FAMILY MEDICINE | Facility: CLINIC | Age: 43
End: 2023-10-19
Payer: COMMERCIAL

## 2023-10-19 NOTE — TELEPHONE ENCOUNTER
----- Message from Johny Baldwin sent at 10/19/2023  1:44 PM CDT -----  Contact: pt at [phone]  Type: Needs Medical Advice  Who Called:  pt  Best Call Back Number: [phone]  Additional Information: pt is calling the office to request a call back from the nurse.

## 2023-10-19 NOTE — TELEPHONE ENCOUNTER
Spoke with pt. Pt would like to know why wife's Rx denied. Advised pt wife needed appt. Pt voiced understanding.

## 2024-01-18 ENCOUNTER — OFFICE VISIT (OUTPATIENT)
Dept: FAMILY MEDICINE | Facility: CLINIC | Age: 44
End: 2024-01-18
Payer: COMMERCIAL

## 2024-01-18 VITALS
DIASTOLIC BLOOD PRESSURE: 78 MMHG | BODY MASS INDEX: 27.31 KG/M2 | OXYGEN SATURATION: 98 % | HEIGHT: 72 IN | SYSTOLIC BLOOD PRESSURE: 110 MMHG | WEIGHT: 201.63 LBS | HEART RATE: 92 BPM | RESPIRATION RATE: 12 BRPM

## 2024-01-18 DIAGNOSIS — I10 ESSENTIAL HYPERTENSION: ICD-10-CM

## 2024-01-18 DIAGNOSIS — M62.81 MUSCLE WEAKNESS (GENERALIZED): ICD-10-CM

## 2024-01-18 DIAGNOSIS — E78.2 ELEVATED CHOLESTEROL WITH ELEVATED TRIGLYCERIDES: ICD-10-CM

## 2024-01-18 DIAGNOSIS — F41.9 ANXIETY: ICD-10-CM

## 2024-01-18 DIAGNOSIS — G24.9 DYSTONIA: ICD-10-CM

## 2024-01-18 DIAGNOSIS — G95.9 MYELOPATHY: Primary | ICD-10-CM

## 2024-01-18 PROCEDURE — 3074F SYST BP LT 130 MM HG: CPT | Mod: S$GLB,,, | Performed by: FAMILY MEDICINE

## 2024-01-18 PROCEDURE — 99214 OFFICE O/P EST MOD 30 MIN: CPT | Mod: S$GLB,,, | Performed by: FAMILY MEDICINE

## 2024-01-18 PROCEDURE — 1159F MED LIST DOCD IN RCRD: CPT | Mod: S$GLB,,, | Performed by: FAMILY MEDICINE

## 2024-01-18 PROCEDURE — 3008F BODY MASS INDEX DOCD: CPT | Mod: S$GLB,,, | Performed by: FAMILY MEDICINE

## 2024-01-18 PROCEDURE — 3078F DIAST BP <80 MM HG: CPT | Mod: S$GLB,,, | Performed by: FAMILY MEDICINE

## 2024-01-18 RX ORDER — TOBRAMYCIN 3 MG/ML
1 SOLUTION/ DROPS OPHTHALMIC EVERY 6 HOURS
COMMUNITY
Start: 2023-08-03

## 2024-01-18 NOTE — PATIENT INSTRUCTIONS
Doing well today  I strongly agree with pursuing medicinal marijuana therapy    Follow up as needed for med refills/adjustments

## 2024-01-18 NOTE — PROGRESS NOTES
"  [facility] - [place], [place]    Family Medicine Office Visit    Chief Complaint   Patient presents with    Follow-up     3 month        HPI:  previous patient of Dr Wasserman    Blood Pressure at goal on medication, with patient reporting prescription compliance  Hyperlipidemia stable on appropriate intensity statin therapy    Has chronic knee arthritis    Nebulous history of tremors/dystonia - presented initially as hemiplegia, and no clear diagnosis.  Has seen multiple physicians and neurologists leading up to this    Current medication regimen seems relatively reproducible    Finds medicinal marijuana helps markedly with symptom control, and helps reduce reliance on pharmacology    ROS: as above    Vitals:    01/18/24 1526   BP: 110/78   BP Location: Left arm   Patient Position: Sitting   BP Method: Medium (Manual)   Pulse: 92   Resp: 12   SpO2: 98%   Weight: 91.4 kg (201 lb 9.6 oz)   Height: 6' 0.01" (1.829 m)      Body mass index is 27.34 kg/m².      General:  AOx3, well nourished and developed in no acute distress  Eyes:  PERRLA, EOMI, vision intact grossly  ENT:  normal hearing, moist oral mucosa  Neck:  trachea midline with no masses or thyromegaly  Heart:  RRR, no murmurs.  No edema noted, extremities warm and well perfused  Lungs:  clear to auscultation bilaterally with symmetric chest movement  Abdomen:  Soft, nontender, nondistended.  Normal bowel sounds  Musculoskeletal:  Normal gait.  Normal posture.  Normal muscular development with no joint swelling.  Neurological:  CN II-XII grossly intact. Symmetric strength and sensation  Psych:  Normal mood and affect.  Able to demonstrate good judgement and personal insight.      Assessment/Plan:    1. Myelopathy    2. Essential hypertension  Overview:  Trial of verapamil for headache ppx and HTN      3. Elevated cholesterol with elevated triglycerides    4. Anxiety  Overview:  Currently prescribed Wellbutrin and Ativan (taking " Ativan daily).       5. Dystonia  Overview:  Onset of dystonic posture in LLE 09/16/19.  Initial partial response to trihexyphenidyl, but did not tolerate (side effects of confusion, speech difficulty).  Responds to carbidopa-levodopa, concern for dopamine-responsive dystonia versus atypical parkinsonism.  Will be important to treatment plan to obtain FIONA scan, likely to .      6. Muscle weakness (generalized)       I greatly endorse pursuit of medicinal marijuana as means to both improve symptoms and reduce reliance on other prescription measures.    BP at goal  Anxiety stable otherwise

## 2024-01-18 NOTE — LETTER
January 18, 2024        Storm Hinds  [address] [place] [address]             [facility]  [address] [address] [place] [address]  Phone: [phone]   Patient: Storm Hinds   MR Number: [id number]   YOB: 1980   Date of Visit: 1/18/2024     To Whom It May Concern:    Storm is currently under my care.  He has a chronic movement disorder/chronic myotonia.  He is an ideal candidate for medicinal marijuana therapy.    Sincerely,      Arnaldo Vazquez MD            CC  No Recipients    Enclosure

## 2024-05-17 ENCOUNTER — PATIENT MESSAGE (OUTPATIENT)
Dept: FAMILY MEDICINE | Facility: CLINIC | Age: 44
End: 2024-05-17
Payer: COMMERCIAL

## 2024-05-17 DIAGNOSIS — G24.9 DYSTONIA: ICD-10-CM

## 2024-05-17 RX ORDER — LORAZEPAM 1 MG/1
0.5 TABLET ORAL EVERY 12 HOURS PRN
Qty: 60 TABLET | Refills: 2 | Status: CANCELLED | OUTPATIENT
Start: 2024-05-17

## 2024-05-17 RX ORDER — LORAZEPAM 1 MG/1
0.5 TABLET ORAL EVERY 12 HOURS PRN
Qty: 60 TABLET | Refills: 2 | Status: SHIPPED | OUTPATIENT
Start: 2024-05-17

## 2024-07-19 ENCOUNTER — PATIENT MESSAGE (OUTPATIENT)
Dept: FAMILY MEDICINE | Facility: CLINIC | Age: 44
End: 2024-07-19
Payer: COMMERCIAL

## 2024-07-19 DIAGNOSIS — G89.29 BILATERAL CHRONIC KNEE PAIN: ICD-10-CM

## 2024-07-19 DIAGNOSIS — M25.562 BILATERAL CHRONIC KNEE PAIN: ICD-10-CM

## 2024-07-19 DIAGNOSIS — I10 ESSENTIAL HYPERTENSION: ICD-10-CM

## 2024-07-19 DIAGNOSIS — M25.561 BILATERAL CHRONIC KNEE PAIN: ICD-10-CM

## 2024-07-21 RX ORDER — DICLOFENAC SODIUM 75 MG/1
75 TABLET, DELAYED RELEASE ORAL 2 TIMES DAILY PRN
Qty: 60 TABLET | Refills: 6 | Status: SHIPPED | OUTPATIENT
Start: 2024-07-21

## 2024-09-06 ENCOUNTER — OFFICE VISIT (OUTPATIENT)
Dept: FAMILY MEDICINE | Facility: CLINIC | Age: 44
End: 2024-09-06
Payer: COMMERCIAL

## 2024-09-06 VITALS
OXYGEN SATURATION: 98 % | BODY MASS INDEX: 24.63 KG/M2 | HEART RATE: 75 BPM | HEIGHT: 72 IN | SYSTOLIC BLOOD PRESSURE: 108 MMHG | WEIGHT: 181.81 LBS | DIASTOLIC BLOOD PRESSURE: 82 MMHG

## 2024-09-06 DIAGNOSIS — M25.562 BILATERAL CHRONIC KNEE PAIN: ICD-10-CM

## 2024-09-06 DIAGNOSIS — G95.9 MYELOPATHY: Primary | ICD-10-CM

## 2024-09-06 DIAGNOSIS — I10 ESSENTIAL HYPERTENSION: ICD-10-CM

## 2024-09-06 DIAGNOSIS — E78.2 ELEVATED CHOLESTEROL WITH ELEVATED TRIGLYCERIDES: ICD-10-CM

## 2024-09-06 DIAGNOSIS — G89.29 BILATERAL CHRONIC KNEE PAIN: ICD-10-CM

## 2024-09-06 DIAGNOSIS — M25.561 BILATERAL CHRONIC KNEE PAIN: ICD-10-CM

## 2024-09-06 DIAGNOSIS — M62.81 MUSCLE WEAKNESS (GENERALIZED): ICD-10-CM

## 2024-09-06 RX ORDER — PREDNISONE 10 MG/1
10 TABLET ORAL DAILY
Qty: 30 TABLET | Refills: 0 | Status: SHIPPED | OUTPATIENT
Start: 2024-09-06 | End: 2024-10-06

## 2024-09-06 RX ORDER — TRAMADOL HYDROCHLORIDE 50 MG/1
50 TABLET ORAL EVERY 12 HOURS PRN
Qty: 30 TABLET | Refills: 1 | Status: SHIPPED | OUTPATIENT
Start: 2024-09-06

## 2024-09-06 NOTE — PATIENT INSTRUCTIONS
Filled medication today  Try course of steroids to reduce overall joint inflammation    Supplements to take:    400mg magnesium oxide at bedtime  Turmeric 1000mg twice a day

## 2024-09-06 NOTE — PROGRESS NOTES
[facility] - [place], [place]    Family Medicine Office Visit    Chief Complaint   Patient presents with    Follow-up     F/u on joint pain         HPI: followup chronic conditions:    Dystonia vs tremor -stable on current regimen.  Initially worked up as stroke vs inflammatory disorder      Finds that generalized joint pain has been steadily worsening - knee, elbow.  This is despite weight loss and dietary improvement    ROS: as above    Vitals:    09/06/24 1520   BP: 108/82   BP Location: Right arm   Patient Position: Sitting   BP Method: Large (Manual)   Pulse: 75   SpO2: 98%   Weight: 82.5 kg (181 lb 12.8 oz)   Height: 6' (1.829 m)      Body mass index is 24.66 kg/m².      General:  AOx3, well nourished and developed in no acute distress  Eyes:  PERRLA, EOMI, vision intact grossly  ENT:  normal hearing, moist oral mucosa  Neck:  trachea midline with no masses or thyromegaly  Heart:  RRR, no murmurs.  No edema noted, extremities warm and well perfused  Lungs:  clear to auscultation bilaterally with symmetric chest movement  Abdomen:  Soft, nontender, nondistended.  Normal bowel sounds  Musculoskeletal:  Normal gait.  Normal posture.  Normal muscular development with no joint swelling.  Neurological:  CN II-XII grossly intact. Left leg weaker than right  Psych:  Normal mood and affect.  Able to demonstrate good judgement and personal insight.      Assessment/Plan:    1. Myelopathy    2. Bilateral chronic knee pain    3. Elevated cholesterol with elevated triglycerides    4. Essential hypertension  Overview:  Trial of verapamil for headache ppx and HTN      5. Muscle weakness (generalized)       Stable chronically, but starting to presents more as diffuse joint pain.  Treat today as inflammatory disorder  As above  Stable  Stable    Visit today included increased complexity associated with the care of the episodic problem myelopathy, tremor, joint pain addressed and managing the  longitudinal care of the patient due to the serious and/or complex managed problem(s) myelopathy, tremor, joint pain.

## 2024-10-18 ENCOUNTER — PATIENT MESSAGE (OUTPATIENT)
Dept: FAMILY MEDICINE | Facility: CLINIC | Age: 44
End: 2024-10-18
Payer: COMMERCIAL

## 2024-11-23 DIAGNOSIS — G24.9 DYSTONIA: ICD-10-CM

## 2024-11-23 NOTE — TELEPHONE ENCOUNTER
Care Due:                  Date            Visit Type   Department     Provider  --------------------------------------------------------------------------------                                MYCHART                              FOLLOWUP/OF  [facility] FAMILY  Last Visit: 09-      FICE VISIT   MEDICINE       DYLAN LYNN  Next Visit: None Scheduled  None         None Found                                                            Last  Test          Frequency    Reason                     Performed    Due Date  --------------------------------------------------------------------------------    CBC.........  12 months..  diclofenac...............  04- 04-    Cr..........  12 months..  diclofenac...............  07- 06-    [employer] Embedded Care Due Messages. Reference number: [id number].   11/23/2024 8:03:39 AM CST   MD JACK AT BEDSIDE.      Solo Purcell RN  10/22/17 [id number]

## 2024-11-25 RX ORDER — LORAZEPAM 1 MG/1
TABLET ORAL
Qty: 60 TABLET | Refills: 2 | Status: SHIPPED | OUTPATIENT
Start: 2024-11-25

## 2024-11-25 NOTE — TELEPHONE ENCOUNTER
Refill Routing Note   Medication(s) are not appropriate for processing by [facility] for the following reason(s):        Outside of protocol    [facility] action(s):  Route     Requires labs : Yes             Appointments  past 12m or future 3m with PCP    Date Provider   Last Visit   9/6/2024 Arnaldo Vazquez MD   Next Visit   Visit date not found Arnaldo Vazquez MD   ED visits in past 90 days: 0        Note composed:7:42 AM 11/25/2024

## 2025-01-09 ENCOUNTER — OFFICE VISIT (OUTPATIENT)
Dept: FAMILY MEDICINE | Facility: CLINIC | Age: 45
End: 2025-01-09
Payer: COMMERCIAL

## 2025-01-09 VITALS
SYSTOLIC BLOOD PRESSURE: 112 MMHG | DIASTOLIC BLOOD PRESSURE: 68 MMHG | OXYGEN SATURATION: 98 % | WEIGHT: 190 LBS | HEIGHT: 72 IN | HEART RATE: 85 BPM | BODY MASS INDEX: 25.73 KG/M2

## 2025-01-09 DIAGNOSIS — M25.561 BILATERAL CHRONIC KNEE PAIN: ICD-10-CM

## 2025-01-09 DIAGNOSIS — R25.9 ABNORMAL INVOLUNTARY MOVEMENTS: ICD-10-CM

## 2025-01-09 DIAGNOSIS — G89.29 BILATERAL CHRONIC KNEE PAIN: ICD-10-CM

## 2025-01-09 DIAGNOSIS — F41.9 ANXIETY: ICD-10-CM

## 2025-01-09 DIAGNOSIS — M25.50 ARTHRALGIA OF MULTIPLE JOINTS: ICD-10-CM

## 2025-01-09 DIAGNOSIS — G24.9 DYSTONIA: Primary | ICD-10-CM

## 2025-01-09 DIAGNOSIS — M79.10 MUSCLE PAIN: ICD-10-CM

## 2025-01-09 DIAGNOSIS — M25.562 BILATERAL CHRONIC KNEE PAIN: ICD-10-CM

## 2025-01-09 PROBLEM — I10 ESSENTIAL HYPERTENSION: Status: RESOLVED | Noted: 2020-01-31 | Resolved: 2025-01-09

## 2025-01-09 PROBLEM — E78.2 ELEVATED CHOLESTEROL WITH ELEVATED TRIGLYCERIDES: Status: RESOLVED | Noted: 2018-04-09 | Resolved: 2025-01-09

## 2025-01-09 RX ORDER — PREDNISONE 10 MG/1
10 TABLET ORAL 2 TIMES DAILY
Qty: 20 TABLET | Refills: 0 | Status: SHIPPED | OUTPATIENT
Start: 2025-01-09 | End: 2025-01-19

## 2025-01-09 RX ORDER — TRAMADOL HYDROCHLORIDE 50 MG/1
50 TABLET ORAL EVERY 12 HOURS PRN
Qty: 30 TABLET | Refills: 1 | Status: SHIPPED | OUTPATIENT
Start: 2025-01-09

## 2025-01-09 RX ORDER — BACLOFEN 20 MG/1
20 TABLET ORAL 4 TIMES DAILY
Qty: 360 TABLET | Refills: 3 | Status: SHIPPED | OUTPATIENT
Start: 2025-01-09 | End: 2026-01-09

## 2025-01-09 RX ORDER — GABAPENTIN 300 MG/1
300 CAPSULE ORAL 2 TIMES DAILY
Qty: 180 CAPSULE | Refills: 3 | Status: SHIPPED | OUTPATIENT
Start: 2025-01-09 | End: 2026-01-09

## 2025-01-09 NOTE — PROGRESS NOTES
[facility]  Primary Care Clinics - [place], [place]    Family Medicine Office Visit    Chief Complaint   Patient presents with    Follow-up     6 month f/u        HPI:  routine followup:    Has some various joint soreness as he had fall while packing down trash-can.    In setting of dystonia vs tremor is stable, but tends to correlate with chronic joint pain    Anxiety stable    ROS: as above    Vitals:    01/09/25 1438   BP: 112/68   BP Location: Left arm   Patient Position: Sitting   Pulse: 85   SpO2: 98%   Weight: 86.2 kg (190 lb)   Height: 6' (1.829 m)      Body mass index is 25.77 kg/m².      General:  AOx3, well nourished and developed in no acute distress  Eyes:  PERRLA, EOMI, vision intact grossly  ENT:  normal hearing, moist oral mucosa  Neck:  trachea midline with no masses or thyromegaly  Heart:  RRR, no murmurs.  No edema noted, extremities warm and well perfused  Lungs:  clear to auscultation bilaterally with symmetric chest movement  Abdomen:  Soft, nontender, nondistended.  Normal bowel sounds  Musculoskeletal:  Normal gait.  Normal posture.  Normal muscular development with no joint swelling.  Neurological:  CN II-XII grossly intact. Symmetric strength and sensation  Psych:  Normal mood and affect.  Able to demonstrate good judgement and personal insight.      Assessment/Plan:    1. Dystonia  Overview:  Onset of dystonic posture in E 09/16/19.  Initial partial response to trihexyphenidyl, but did not tolerate (side effects of confusion, speech difficulty).  Responds to carbidopa-levodopa, concern for dopamine-responsive dystonia versus atypical parkinsonism.  Will be important to treatment plan to obtain FIONA scan, likely to .      2. Bilateral chronic knee pain    3. Muscle pain    4. Abnormal involuntary movements    5. Anxiety  Overview:  Currently prescribed Wellbutrin and Ativan (taking Ativan daily).       6. Arthralgia of multiple joints         Give pulse of  steroids in setting of chronic conditions  Stable on medication  Stable with judicious medication use  As above  Stable  Stable as above    Visit today included increased complexity associated with the care of the episodic problem dystonia, muscle pain, anxiety addressed and managing the longitudinal care of the patient due to the serious and/or complex managed problem(s) dystonia, muscle pain, anxiety.

## 2025-01-13 ENCOUNTER — PATIENT MESSAGE (OUTPATIENT)
Dept: FAMILY MEDICINE | Facility: CLINIC | Age: 45
End: 2025-01-13
Payer: COMMERCIAL

## 2025-01-13 DIAGNOSIS — G24.9 DYSTONIA: ICD-10-CM

## 2025-01-14 RX ORDER — CARBIDOPA AND LEVODOPA 25; 100 MG/1; MG/1
2 TABLET ORAL 3 TIMES DAILY
Qty: 540 TABLET | Refills: 2 | Status: SHIPPED | OUTPATIENT
Start: 2025-01-14

## 2025-02-02 ENCOUNTER — HOSPITAL ENCOUNTER (EMERGENCY)
Facility: HOSPITAL | Age: 45
Discharge: HOME | End: 2025-02-02
Attending: STUDENT IN AN ORGANIZED HEALTH CARE EDUCATION/TRAINING PROGRAM
Payer: COMMERCIAL

## 2025-02-02 VITALS
OXYGEN SATURATION: 100 % | HEIGHT: 72 IN | TEMPERATURE: 98 F | SYSTOLIC BLOOD PRESSURE: 126 MMHG | HEART RATE: 100 BPM | DIASTOLIC BLOOD PRESSURE: 75 MMHG | WEIGHT: 180 LBS | BODY MASS INDEX: 24.38 KG/M2 | RESPIRATION RATE: 18 BRPM

## 2025-02-02 DIAGNOSIS — K64.4 EXTERNAL HEMORRHOIDS: Primary | ICD-10-CM

## 2025-02-02 PROCEDURE — 94761 N-INVAS EAR/PLS OXIMETRY MLT: CPT

## 2025-02-02 PROCEDURE — 99283 EMERGENCY DEPT VISIT LOW MDM: CPT

## 2025-02-02 PROCEDURE — 25000003 PHARM REV CODE 250: Performed by: STUDENT IN AN ORGANIZED HEALTH CARE EDUCATION/TRAINING PROGRAM

## 2025-02-02 RX ORDER — HYDROCORTISONE ACETATE 25 MG/1
25 SUPPOSITORY RECTAL 2 TIMES DAILY
Qty: 24 SUPPOSITORY | Refills: 0 | Status: SHIPPED | OUTPATIENT
Start: 2025-02-02

## 2025-02-02 RX ORDER — LIDOCAINE HYDROCHLORIDE 20 MG/ML
20 SOLUTION OROPHARYNGEAL
Status: COMPLETED | OUTPATIENT
Start: 2025-02-02 | End: 2025-02-02

## 2025-02-02 RX ADMIN — LIDOCAINE HYDROCHLORIDE 15 ML: 20 SOLUTION ORAL at 10:02

## 2025-02-02 NOTE — Clinical Note
"Storm Friedman" Guzman was seen and treated in our emergency department on 2/2/2025.  He may return to work on 02/04/2025.       If you have any questions or concerns, please don't hesitate to call.      Kia GLEZ    "

## 2025-02-02 NOTE — ED PROVIDER NOTES
Encounter Date: 2/2/2025       History     Chief Complaint   Patient presents with    Hemorrhoids     Painful x 5 days . OTC medications and creams attempted.      44-year-old male presents to ED with complaints of 5 day history of painful hemorrhoids.  He denies associated bleeding. He has tried over-the-counter medications including creams without any relief.  He admits to recent hard stools. Denies rectal bleeding.    The history is provided by the patient. No  was used.     Review of patient's allergies indicates:  No Known Allergies  Past Medical History:   Diagnosis Date    Acid reflux     Depression     Muscle spasm     Pain     Tremors of nervous system      No past surgical history on file.  Family History   Problem Relation Name Age of Onset    Hypertension Mother      Stroke Mother      Cancer Father      Hypertension Paternal Grandmother      Hypertension Paternal Grandfather       Social History     Tobacco Use    Smoking status: Former    Smokeless tobacco: Former   Substance Use Topics    Alcohol use: No     Comment: use to    Drug use: Yes     Types: Benzodiazepines     Comment: as prescribed     Review of Systems   Constitutional: Negative.    HENT: Negative.     Eyes: Negative.    Respiratory: Negative.     Cardiovascular: Negative.    Gastrointestinal:  Positive for rectal pain.   Endocrine: Negative.    Genitourinary: Negative.    Musculoskeletal: Negative.    Skin: Negative.    Neurological: Negative.    Hematological: Negative.    Psychiatric/Behavioral: Negative.     All other systems reviewed and are negative.      Physical Exam     Initial Vitals [02/02/25 0925]   BP Pulse Resp Temp SpO2   126/75 100 18 98 °F (36.7 °C) 100 %      MAP       --         Physical Exam    Nursing note and vitals reviewed.  Constitutional: He appears well-developed.   HENT:   Head: Normocephalic.   Eyes: Pupils are equal, round, and reactive to light.   Neck:   Normal range of  motion.  Cardiovascular:  Normal rate.           Pulmonary/Chest: Breath sounds normal.   Abdominal: Abdomen is soft. Bowel sounds are normal.   Genitourinary:    Genitourinary Comments: Large soft tender, nonthrombosed external hemorrhoids     Musculoskeletal:      Cervical back: Normal range of motion.     Neurological: He is alert and oriented to person, place, and time. He has normal strength. GCS score is 15. GCS eye subscore is 4. GCS verbal subscore is 5. GCS motor subscore is 6.   Skin: Skin is warm. Capillary refill takes less than 2 seconds.         ED Course   Procedures  Labs Reviewed - No data to display       Imaging Results    None          Medications   LIDOcaine viscous HCl 2% oral solution 20 mL (has no administration in time range)     Medical Decision Making  44-year-old male with 5 day history of hemorrhoids  flare-up.  On exam there is large soft tender, nonthrombosed external hemorrhoids  Rx lidocaine, Anusol p.r.n.  Patient was instructed to follow up with General surgery and was given strict return precautions to the ED.   The patient voiced understanding and agreed with the plan      Risk  Prescription drug management.                                      Clinical Impression:  Final diagnoses:  [K64.4] External hemorrhoids (Primary)          ED Disposition Condition    Discharge Stable          ED Prescriptions       Medication Sig Dispense Start Date End Date Auth. Provider    hydrocortisone (ANUSOL-HC) 25 mg suppository Place 1 suppository (25 mg total) rectally 2 (two) times daily. 24 suppository 2/2/2025 -- Christiano Singh MD          Follow-up Information       Follow up With Specialties Details Why Contact Info    Arnaldo Vazquez MD Family Medicine  As needed [id number]  [facility] [place] [facility] [address]  [phone]               Christiano Singh MD  02/02/25 1002

## 2025-02-02 NOTE — DISCHARGE INSTRUCTIONS
Use prescription hydrocortisone suppository twice a day as prescribed    To help with constipation:  Use stool softeners when needed.  Eat high-fiber foods. These include whole grains, fruits, and vegetables.  Drink plenty of water and other fluids each day. This helps to keep your stools soft.  Set a regular schedule to try and have a bowel movement. Do not ignore the urge to go to the bathroom. Dont hold it in.  Give yourself plenty of time to have a bowel movement, but do not linger on the toilet either, by sitting and reading for a long time.  Do mild exercise each day like taking a walk.  Avoid heavy lifting or straining while the hemorrhoid is healing.    Follow up with General surgery as referred for further management of hemorrhoids

## 2025-02-13 ENCOUNTER — TELEPHONE (OUTPATIENT)
Dept: SURGERY | Facility: CLINIC | Age: 45
End: 2025-02-13

## 2025-02-13 NOTE — TELEPHONE ENCOUNTER
PORSCHEM to inform pt that Dr. Batista was called into surgery again and his appointment will need to be rescheduled. Options for appointments would be 2/14 in the AM or 2/18 PM in [place].

## 2025-02-15 ENCOUNTER — PATIENT MESSAGE (OUTPATIENT)
Dept: INTERNAL MEDICINE | Facility: CLINIC | Age: 45
End: 2025-02-15
Payer: COMMERCIAL

## 2025-02-17 ENCOUNTER — OFFICE VISIT (OUTPATIENT)
Dept: FAMILY MEDICINE | Facility: CLINIC | Age: 45
End: 2025-02-17
Payer: COMMERCIAL

## 2025-02-17 VITALS
BODY MASS INDEX: 25.2 KG/M2 | SYSTOLIC BLOOD PRESSURE: 104 MMHG | RESPIRATION RATE: 12 BRPM | WEIGHT: 185.81 LBS | DIASTOLIC BLOOD PRESSURE: 72 MMHG | HEART RATE: 66 BPM

## 2025-02-17 DIAGNOSIS — G95.9 MYELOPATHY: ICD-10-CM

## 2025-02-17 DIAGNOSIS — G24.9 DYSTONIA: ICD-10-CM

## 2025-02-17 DIAGNOSIS — K64.9 HEMORRHOIDS, UNSPECIFIED HEMORRHOID TYPE: Primary | ICD-10-CM

## 2025-02-17 NOTE — PROGRESS NOTES
[facility]  Primary Care Clinics - [place], [place]    Family Medicine Office Visit    Chief Complaint   Patient presents with    Rectal Pain        HPI:  44 male with chronic dystonia vs tremor.  Actively sees neurology.      Presents today with worrisome hemorrhoids.  Went to ED on 2/2/25 for rectal bleeding.  Now has appt with Dr. Batista.    Using topical suppositories and numerous OTC therapies.    ROS: as above    Vitals:    02/17/25 0716   BP: 104/72   Pulse: 66   Resp: 12   Weight: 84.3 kg (185 lb 12.8 oz)      Body mass index is 25.2 kg/m².      General:  AOx3, well nourished and developed in no acute distress  Eyes:  PERRLA, EOMI, vision intact grossly  ENT:  normal hearing, moist oral mucosa  Neck:  trachea midline with no masses or thyromegaly  Heart:  RRR, no murmurs.  No edema noted, extremities warm and well perfused  Lungs:  clear to auscultation bilaterally with symmetric chest movement  Abdomen:  Soft, nontender, nondistended.  Normal bowel sounds  Musculoskeletal:  Normal gait.  Normal posture.  Normal muscular development with no joint swelling.  Neurological:  CN II-XII grossly intact. Symmetric strength and sensation  Psych:  Normal mood and affect.  Able to demonstrate good judgement and personal insight.      Assessment/Plan:    1. Hemorrhoids, unspecified hemorrhoid type    2. Dystonia  Overview:  Onset of dystonic posture in E 09/16/19.  Initial partial response to trihexyphenidyl, but did not tolerate (side effects of confusion, speech difficulty).  Responds to carbidopa-levodopa, concern for dopamine-responsive dystonia versus atypical parkinsonism.  Will be important to treatment plan to obtain FIONA scan, likely to .      3. Myelopathy         Advised extensively on behavioral change - bidet, sitz baths, adequate hydration, and continued topical OTC therapy.  See GI if unable to see surgery  Stable with medication and neurology  As above    Visit today included  increased complexity associated with the care of the episodic problem dystonia and hemorrhoid addressed and managing the longitudinal care of the patient due to the serious and/or complex managed problem(s) dystonia and hemorrhoid.

## 2025-02-17 NOTE — PATIENT INSTRUCTIONS
Keep followup  Get bidet attachment to reduce irritation to rectal area  Expose area to warm water in bath or shower nightly    Keep follow up with Dr. Batista tomorrow    Will place referral to Dr. Howard if you cannot see Dr. Batista

## 2025-02-18 ENCOUNTER — OFFICE VISIT (OUTPATIENT)
Facility: CLINIC | Age: 45
End: 2025-02-18
Payer: COMMERCIAL

## 2025-02-18 VITALS
WEIGHT: 185 LBS | HEART RATE: 87 BPM | HEIGHT: 72 IN | OXYGEN SATURATION: 98 % | DIASTOLIC BLOOD PRESSURE: 74 MMHG | SYSTOLIC BLOOD PRESSURE: 112 MMHG | BODY MASS INDEX: 25.06 KG/M2

## 2025-02-18 DIAGNOSIS — K64.2 GRADE III HEMORRHOIDS: Primary | ICD-10-CM

## 2025-02-18 RX ORDER — SODIUM CHLORIDE 9 MG/ML
INJECTION, SOLUTION INTRAVENOUS CONTINUOUS
OUTPATIENT
Start: 2025-02-18

## 2025-02-18 NOTE — PROGRESS NOTES
Subjective     Patient ID: Storm Hinds  is a 44 y.o. male     Chief Complaint: No chief complaint on file.     There were no vitals filed for this visit.    Pertinent for evaluation of bleeding painful engorged hemorrhoids.  He reports grade 3 in nature they swell and poked out.  Sometimes he has to manually reduced some.  Does bleed into the toilet.  No previous colonoscopy.  Still having problems was prescribed and you cord suppositories but he is out of him.  He is on no fiber supplementation.    Past Medical History:   Diagnosis Date    Acid reflux     Depression     Muscle spasm     Pain     Tremors of nervous system      History reviewed. No pertinent surgical history.  Family History   Problem Relation Name Age of Onset    Hypertension Mother      Stroke Mother      Cancer Father      Hypertension Paternal Grandmother      Hypertension Paternal Grandfather       History reviewed. No pertinent surgical history.  Social History     Socioeconomic History    Marital status:    Tobacco Use    Smoking status: Former    Smokeless tobacco: Former   Substance and Sexual Activity    Alcohol use: No     Comment: use to    Drug use: Yes     Types: Benzodiazepines     Comment: as prescribed    Sexual activity: Yes     Partners: Female     Birth control/protection: None     Social Drivers of Health     Financial Resource Strain: Low Risk  (2/18/2025)    Overall Financial Resource Strain (CARDIA)     Difficulty of Paying Living Expenses: Not very hard   Food Insecurity: No Food Insecurity (2/18/2025)    Hunger Vital Sign     Worried About Running Out of Food in the Last Year: Never true     Ran Out of Food in the Last Year: Never true   Transportation Needs: No Transportation Needs (2/18/2025)    PRAPARE - Transportation     Lack of Transportation (Medical): No     Lack of Transportation (Non-Medical): No   Physical Activity: Sufficiently Active (2/18/2025)    Exercise Vital Sign     Days of Exercise per Week:  5 days     Minutes of Exercise per Session: 150+ min   Stress: No Stress Concern Present (2/18/2025)    [employer] - Occupational Stress Questionnaire     Feeling of Stress : Not at all   Housing Stability: High Risk (2/18/2025)    Housing Stability Vital Sign     Unable to Pay for Housing in the Last Year: Yes     Number of Times Moved in the Last Year: 0     Homeless in the Last Year: No      Current Medications[1]   Review of patient's allergies indicates:  No Known Allergies         Review of Systems   Gastrointestinal:  Positive for blood in stool.        Bleeding painful engorged hemorrhoids for several weeks        I have reviewed the following:     Details / Date    []   Labs     []   Micro     []   Pathology     []   Imaging     []   Cardiology Procedures     []   Other         Objective         Physical Exam  Vitals and nursing note reviewed.   Constitutional:       Appearance: Normal appearance. He is normal weight.   HENT:      Head: Normocephalic and atraumatic.      Nose: Nose normal.      Mouth/Throat:      Mouth: Mucous membranes are moist.   Eyes:      Extraocular Movements: Extraocular movements intact.      Pupils: Pupils are equal, round, and reactive to light.   Cardiovascular:      Rate and Rhythm: Normal rate.   Pulmonary:      Effort: Pulmonary effort is normal.   Abdominal:      General: Abdomen is flat.      Palpations: Abdomen is soft.   Genitourinary:     Comments: Rectal exam.  Normal sphincter tone.  Very engorged erythematous external hemorrhoids with prominent internal component.  Musculoskeletal:         General: Normal range of motion.      Cervical back: Normal range of motion.   Skin:     General: Skin is warm.   Neurological:      General: No focal deficit present.      Mental Status: He is alert and oriented to person, place, and time. Mental status is at baseline.   Psychiatric:         Mood and Affect: Mood normal.         Behavior: Behavior  normal.          Assessment and Plan     1. Grade III hemorrhoids  -     Vital signs; Standing  -     Insert peripheral IV; Standing  -     Diet NPO; Standing  -     Case Request Operating Room: HEMORRHOIDECTOMY  -     Full code; Standing  -     Place in Outpatient; Standing  -     Place JOE hose; Standing    Other orders  -     0.9% NaCl infusion  -     IP VTE LOW RISK PATIENT; Standing  -     ceFAZolin 2 g in D5W 50 mL IVPB (MB+)       Orders Placed This Encounter   Procedures    Insert peripheral IV      Bleeding painful engorged internal hemorrhoids with the external component.  Needs harmonic scalpel hemorrhoidectomy.  Also needs referral for colonoscopy following above.  Risks and benefits of procedure have been explained to the patient he states he understands this understands and wishes to proceed.  In the interim we will prescribe steroid suppositories  An After Visit Summary was printed and given to the patient.       Hector Batista MD  [facility] General Surgery  [address] [place] [address],[place] [address]  [phone]     This note was created using [employer] direct voice recognition software. Note may have occasional typographical errors that may not have been identified and edited despite initial review prior to signing.     Patient instructed that best way to communicate with my office staff is for patient to get on the [facility] patient portal to expedite communication and communication issues that may occur.  Patient was given instructions on how to get on the portal.  I encouraged patient to obtain portal access as well.  Ultimately it is up to the patient to obtain access.  Patient voiced understanding.         [1]   Current Outpatient Medications:     baclofen (LIORESAL) 20 MG tablet, Take 1 tablet (20 mg total) by mouth 4 (four) times daily., Disp: 360 tablet, Rfl: 3    carbidopa-levodopa  mg (SINEMET)  mg per tablet, Take 2 tablets by mouth 3 (three) times  daily., Disp: 540 tablet, Rfl: 2    diclofenac (VOLTAREN) 75 MG EC tablet, Take 1 tablet (75 mg total) by mouth 2 (two) times daily as needed (pain)., Disp: 60 tablet, Rfl: 6    gabapentin (NEURONTIN) 300 MG capsule, Take 1 capsule (300 mg total) by mouth 2 (two) times daily., Disp: 180 capsule, Rfl: 3    hydrocortisone (ANUSOL-HC) 25 mg suppository, Place 1 suppository (25 mg total) rectally 2 (two) times daily., Disp: 24 suppository, Rfl: 0    LORazepam (ATIVAN) 1 MG tablet, TAKE ONE-HALF TABLET BY MOUTH EVERY TWELVE HOURS AS NEEDED FOR ANXIETY, Disp: 60 tablet, Rfl: 2    tobramycin sulfate 0.3% (TOBREX) 0.3 % ophthalmic solution, Place 1 drop into the left eye every 6 (six) hours., Disp: , Rfl:     traMADoL (ULTRAM) 50 mg tablet, Take 1 tablet (50 mg total) by mouth every 12 (twelve) hours as needed for Pain., Disp: 30 tablet, Rfl: 1

## 2025-02-18 NOTE — H&P (VIEW-ONLY)
Subjective     Patient ID: Storm Hinds  is a 44 y.o. male     Chief Complaint: No chief complaint on file.     There were no vitals filed for this visit.    Pertinent for evaluation of bleeding painful engorged hemorrhoids.  He reports grade 3 in nature they swell and poked out.  Sometimes he has to manually reduced some.  Does bleed into the toilet.  No previous colonoscopy.  Still having problems was prescribed and you cord suppositories but he is out of him.  He is on no fiber supplementation.    Past Medical History:   Diagnosis Date    Acid reflux     Depression     Muscle spasm     Pain     Tremors of nervous system      History reviewed. No pertinent surgical history.  Family History   Problem Relation Name Age of Onset    Hypertension Mother      Stroke Mother      Cancer Father      Hypertension Paternal Grandmother      Hypertension Paternal Grandfather       History reviewed. No pertinent surgical history.  Social History     Socioeconomic History    Marital status:    Tobacco Use    Smoking status: Former    Smokeless tobacco: Former   Substance and Sexual Activity    Alcohol use: No     Comment: use to    Drug use: Yes     Types: Benzodiazepines     Comment: as prescribed    Sexual activity: Yes     Partners: Female     Birth control/protection: None     Social Drivers of Health     Financial Resource Strain: Low Risk  (2/18/2025)    Overall Financial Resource Strain (CARDIA)     Difficulty of Paying Living Expenses: Not very hard   Food Insecurity: No Food Insecurity (2/18/2025)    Hunger Vital Sign     Worried About Running Out of Food in the Last Year: Never true     Ran Out of Food in the Last Year: Never true   Transportation Needs: No Transportation Needs (2/18/2025)    PRAPARE - Transportation     Lack of Transportation (Medical): No     Lack of Transportation (Non-Medical): No   Physical Activity: Sufficiently Active (2/18/2025)    Exercise Vital Sign     Days of Exercise per Week:  5 days     Minutes of Exercise per Session: 150+ min   Stress: No Stress Concern Present (2/18/2025)    [employer] - Occupational Stress Questionnaire     Feeling of Stress : Not at all   Housing Stability: High Risk (2/18/2025)    Housing Stability Vital Sign     Unable to Pay for Housing in the Last Year: Yes     Number of Times Moved in the Last Year: 0     Homeless in the Last Year: No      Current Medications[1]   Review of patient's allergies indicates:  No Known Allergies         Review of Systems   Gastrointestinal:  Positive for blood in stool.        Bleeding painful engorged hemorrhoids for several weeks        I have reviewed the following:     Details / Date    []   Labs     []   Micro     []   Pathology     []   Imaging     []   Cardiology Procedures     []   Other         Objective         Physical Exam  Vitals and nursing note reviewed.   Constitutional:       Appearance: Normal appearance. He is normal weight.   HENT:      Head: Normocephalic and atraumatic.      Nose: Nose normal.      Mouth/Throat:      Mouth: Mucous membranes are moist.   Eyes:      Extraocular Movements: Extraocular movements intact.      Pupils: Pupils are equal, round, and reactive to light.   Cardiovascular:      Rate and Rhythm: Normal rate.   Pulmonary:      Effort: Pulmonary effort is normal.   Abdominal:      General: Abdomen is flat.      Palpations: Abdomen is soft.   Genitourinary:     Comments: Rectal exam.  Normal sphincter tone.  Very engorged erythematous external hemorrhoids with prominent internal component.  Musculoskeletal:         General: Normal range of motion.      Cervical back: Normal range of motion.   Skin:     General: Skin is warm.   Neurological:      General: No focal deficit present.      Mental Status: He is alert and oriented to person, place, and time. Mental status is at baseline.   Psychiatric:         Mood and Affect: Mood normal.         Behavior: Behavior  normal.          Assessment and Plan     1. Grade III hemorrhoids  -     Vital signs; Standing  -     Insert peripheral IV; Standing  -     Diet NPO; Standing  -     Case Request Operating Room: HEMORRHOIDECTOMY  -     Full code; Standing  -     Place in Outpatient; Standing  -     Place JOE hose; Standing    Other orders  -     0.9% NaCl infusion  -     IP VTE LOW RISK PATIENT; Standing  -     ceFAZolin 2 g in D5W 50 mL IVPB (MB+)       Orders Placed This Encounter   Procedures    Insert peripheral IV      Bleeding painful engorged internal hemorrhoids with the external component.  Needs harmonic scalpel hemorrhoidectomy.  Also needs referral for colonoscopy following above.  Risks and benefits of procedure have been explained to the patient he states he understands this understands and wishes to proceed.  In the interim we will prescribe steroid suppositories  An After Visit Summary was printed and given to the patient.       Hector Batista MD  [facility] General Surgery  [address] [place] [address],[place] [address]  [phone]     This note was created using [employer] direct voice recognition software. Note may have occasional typographical errors that may not have been identified and edited despite initial review prior to signing.     Patient instructed that best way to communicate with my office staff is for patient to get on the [facility] patient portal to expedite communication and communication issues that may occur.  Patient was given instructions on how to get on the portal.  I encouraged patient to obtain portal access as well.  Ultimately it is up to the patient to obtain access.  Patient voiced understanding.         [1]   Current Outpatient Medications:     baclofen (LIORESAL) 20 MG tablet, Take 1 tablet (20 mg total) by mouth 4 (four) times daily., Disp: 360 tablet, Rfl: 3    carbidopa-levodopa  mg (SINEMET)  mg per tablet, Take 2 tablets by mouth 3 (three) times  daily., Disp: 540 tablet, Rfl: 2    diclofenac (VOLTAREN) 75 MG EC tablet, Take 1 tablet (75 mg total) by mouth 2 (two) times daily as needed (pain)., Disp: 60 tablet, Rfl: 6    gabapentin (NEURONTIN) 300 MG capsule, Take 1 capsule (300 mg total) by mouth 2 (two) times daily., Disp: 180 capsule, Rfl: 3    hydrocortisone (ANUSOL-HC) 25 mg suppository, Place 1 suppository (25 mg total) rectally 2 (two) times daily., Disp: 24 suppository, Rfl: 0    LORazepam (ATIVAN) 1 MG tablet, TAKE ONE-HALF TABLET BY MOUTH EVERY TWELVE HOURS AS NEEDED FOR ANXIETY, Disp: 60 tablet, Rfl: 2    tobramycin sulfate 0.3% (TOBREX) 0.3 % ophthalmic solution, Place 1 drop into the left eye every 6 (six) hours., Disp: , Rfl:     traMADoL (ULTRAM) 50 mg tablet, Take 1 tablet (50 mg total) by mouth every 12 (twelve) hours as needed for Pain., Disp: 30 tablet, Rfl: 1

## 2025-02-18 NOTE — PATIENT INSTRUCTIONS
Pre-operative Instructions     Date of procedure:  02/24/2025      Do not take the following Medications for 3 days prior to your procedure:   Aspirin, Excedrin, BC powder or goodies powders   Ibuprofen or Motrin  Naproxen or Aleve  Fish oils  Vitamin E    Pre-OP Instructions  On the night of 02/23/2025 Scrub the surgical area with Hibiclens for several minutes  On the morning of 02/24/2025 scrub the surgical area with Hibiclens for several minutes  Do not shave or clip hair at the site of your surgery  Do not wear jewelry to the hospital.  You will have to remove it.  Leave at home so that it is not lost.    Food/Drink   Do not eat or drink after Midnight     Location of Department:   [facility] [address], [place] [address]    Parking:    Use parking lot 1  Enter at the main hospital entrance  Check in with outpatient registration    Contact:   Someone from surgery will call you with a time of arrival.   If you surgery is on Monday, someone will contact you on Friday.  If you do not receive a call from surgery by 2pm the business day (02/21/2025) before your procedure, please call [phone].     Medications:   You may take your blood pressure/thyroid/seizure medications with a small sip of water. Take all other morning medications after the procedure        Transportation:   You will NOT be able to drive yourself.  You are required to have someone drive you home from the hospital due to the anesthesia.                Post-operative Instructions      RESTRICTIONS:   During your procedure today, you received medications for sedation.     These medications may affect your judgement, balance, and coordination.     DO NOT drive a car, operate machinery, make legal/financial decisions, sign important papers or drink alcohol on day of procedure.     ACTIVITY:   After your surgery you CANNOT LIFT anything over 10 pounds, no pushing, no pulling, no bending or no strenuous  exercise UNTIL released by doctor.  Do not bath until your sutures or staples are removed.  You may take a shower. Keep surgical areas clean and dry, re-bandage areas as needed.     DIET AND MEDICATIONS    May eat and drink normally unless instructed otherwise.     Continue present medications unless otherwise instructed     TREATMENT FOR COMMON SIDE EFFECTS:   Pain medication is prescribed to be taken as needed, NOT on a schedule. Use pain medication for periods of high activity and before sleep. Providers have limits on amounts of medications that they may prescribe. Use pain medication properly to assure availability.     Because air was used during your procedure you may experience mild abdominal pain, nausea, belching, bloating or excessive gas: walking, rest, eat lightly and use a heating pad to help alleviate discomfort.     Sore throat: treat with throat lozenges and/or gargle with warm salt water.     If a bowel prep was taken, you may not have a bowel movement for 1-3 days. This is normal.     IF YOU HAVE ANY OF THE SYMPTOMS BELOW, REPORT TO YOUR PHYSICIAN:     1. Excessive or unexpected pain in back or abdomen     2. Signs of infection such as: chills or fever occurring within 24 hours after the procedure.     3. Rectal bleeding, which would show as bright red, maroon, or black stools. (A tablespoon of blood from the rectum is not serious, especially if hemorrhoids are present.)     4. Vomiting     5. Weakness or dizziness.     IF YOU EXPERIENCE ANY OF THE FOLLOWING, GO DIRECTLY TO THE NEAREST EMERGENCY ROOM:  1. Difficulty breathing     2. Chills and/or fever over 101 F     3. Persistent vomiting and/or vomiting blood     4. Severe abdominal pain     5. Chest pain     6. Black, tarry stools     7. Bleeding-more than one tablespoon     8. Any other symptoms or condition that you feel may need urgent attention.       YOUR DOCTOR RECOMMENDS THESE ADDITIONAL INSTRUCTIONS:     1. If any biopsies were taken,  your results will be discussed at your follow up appointment     2. Further recommendations will depend on how you are recovering from you procedure

## 2025-02-20 ENCOUNTER — ANESTHESIA EVENT (OUTPATIENT)
Dept: SURGERY | Facility: HOSPITAL | Age: 45
End: 2025-02-20
Payer: COMMERCIAL

## 2025-02-20 NOTE — ANESTHESIA PREPROCEDURE EVALUATION
02/20/2025  Storm Hinds is a 44 y.o., male.   has no past surgical history on file.       Pre-op Assessment    I have reviewed the Patient Summary Reports.     I have reviewed the Nursing Notes. I have reviewed the NPO Status.   I have reviewed the Medications.     Review of Systems  Anesthesia Hx:  No problems with previous Anesthesia             Denies Family Hx of Anesthesia complications.    Denies Personal Hx of Anesthesia complications.                    Social:  Former Smoker 3 ppd x 15.  Quit 2009  Former multisubstance abuse.  None x years.  Outside note  Chronic benzodiazepine usage      Hematology/Oncology:    Oncology Normal    -- Anemia (37.8  mild, stable):                                  EENT/Dental:   Hearing loss          Cardiovascular:  Cardiovascular Normal                  ECG has been reviewed. EKG:                            Pulmonary:        Sleep Apnea YEVGENIY  sleep study 2022               Renal/:  Renal/ Normal                 Hepatic/GI:     GERD                Musculoskeletal:  Arthritis   DJD multilevel  myelopathy            Neurological:      Headaches (chronic intractable migraine)     Movement disorder.  Has had full eval by neurology.  Parkinson's  Memory loss                            Endocrine:  Endocrine Normal    A1C 5.6        Psych:  Psychiatric History anxiety depression              Physical Exam  General: Well nourished, Cooperative, Alert and Oriented    Airway:  Mallampati: II   Mouth Opening: Normal  TM Distance: Normal  Tongue: Normal  Neck ROM: Normal ROM    Dental:  Partial Dentures    Chest/Lungs:  Clear to auscultation, Normal Respiratory Rate    Heart:  Rate: Normal  Rhythm: Regular Rhythm    Anesthesia Plan  Type of Anesthesia, risks & benefits discussed:    Anesthesia Type: Gen ETT  Intra-op Monitoring Plan: Standard ASA Monitors  Post  Op Pain Control Plan: IV/PO Opioids PRN  Induction:  IV  Airway Plan: Video  Informed Consent: Informed consent signed with the Patient and all parties understand the risks and agree with anesthesia plan.  All questions answered.   ASA Score: 2  Day of Surgery Review of History & Physical: H&P Update referred to the surgeon/provider.    Ready For Surgery From Anesthesia Perspective.   .

## 2025-02-24 ENCOUNTER — HOSPITAL ENCOUNTER (OUTPATIENT)
Facility: HOSPITAL | Age: 45
Discharge: HOME | End: 2025-02-24
Attending: SPECIALIST | Admitting: SPECIALIST
Payer: COMMERCIAL

## 2025-02-24 ENCOUNTER — ANESTHESIA (OUTPATIENT)
Dept: SURGERY | Facility: HOSPITAL | Age: 45
End: 2025-02-24
Payer: COMMERCIAL

## 2025-02-24 VITALS
DIASTOLIC BLOOD PRESSURE: 76 MMHG | BODY MASS INDEX: 24.11 KG/M2 | HEART RATE: 71 BPM | SYSTOLIC BLOOD PRESSURE: 123 MMHG | WEIGHT: 178 LBS | TEMPERATURE: 98 F | HEIGHT: 72 IN | RESPIRATION RATE: 9 BRPM | OXYGEN SATURATION: 99 %

## 2025-02-24 DIAGNOSIS — K64.2 GRADE III HEMORRHOIDS: Primary | ICD-10-CM

## 2025-02-24 PROCEDURE — 36000707: Performed by: SPECIALIST

## 2025-02-24 PROCEDURE — 99499 UNLISTED E&M SERVICE: CPT | Mod: ,,, | Performed by: SPECIALIST

## 2025-02-24 PROCEDURE — 88304 TISSUE EXAM BY PATHOLOGIST: CPT | Mod: 26,,, | Performed by: PATHOLOGY

## 2025-02-24 PROCEDURE — 71000033 HC RECOVERY, INTIAL HOUR: Performed by: SPECIALIST

## 2025-02-24 PROCEDURE — 63600175 PHARM REV CODE 636 W HCPCS: Mod: JZ,TB | Performed by: SPECIALIST

## 2025-02-24 PROCEDURE — 27201423 OPTIME MED/SURG SUP & DEVICES STERILE SUPPLY: Performed by: SPECIALIST

## 2025-02-24 PROCEDURE — 25000003 PHARM REV CODE 250: Performed by: NURSE ANESTHETIST, CERTIFIED REGISTERED

## 2025-02-24 PROCEDURE — 71000015 HC POSTOP RECOV 1ST HR: Performed by: SPECIALIST

## 2025-02-24 PROCEDURE — 88304 TISSUE EXAM BY PATHOLOGIST: CPT | Performed by: PATHOLOGY

## 2025-02-24 PROCEDURE — 63600175 PHARM REV CODE 636 W HCPCS: Performed by: SPECIALIST

## 2025-02-24 PROCEDURE — 37000008 HC ANESTHESIA 1ST 15 MINUTES: Performed by: SPECIALIST

## 2025-02-24 PROCEDURE — 36000706: Performed by: SPECIALIST

## 2025-02-24 PROCEDURE — 46260 REMOVE IN/EX HEM GROUPS 2+: CPT | Mod: ,,, | Performed by: SPECIALIST

## 2025-02-24 PROCEDURE — 25000003 PHARM REV CODE 250: Performed by: SPECIALIST

## 2025-02-24 PROCEDURE — 63600175 PHARM REV CODE 636 W HCPCS: Performed by: NURSE ANESTHETIST, CERTIFIED REGISTERED

## 2025-02-24 PROCEDURE — 37000009 HC ANESTHESIA EA ADD 15 MINS: Performed by: SPECIALIST

## 2025-02-24 RX ORDER — ROCURONIUM BROMIDE 10 MG/ML
INJECTION, SOLUTION INTRAVENOUS
Status: DISCONTINUED | OUTPATIENT
Start: 2025-02-24 | End: 2025-02-24

## 2025-02-24 RX ORDER — ONDANSETRON HYDROCHLORIDE 2 MG/ML
4 INJECTION, SOLUTION INTRAVENOUS DAILY PRN
Status: DISCONTINUED | OUTPATIENT
Start: 2025-02-24 | End: 2025-02-24 | Stop reason: HOSPADM

## 2025-02-24 RX ORDER — OXYCODONE AND ACETAMINOPHEN 5; 325 MG/1; MG/1
1 TABLET ORAL EVERY 4 HOURS PRN
Qty: 30 TABLET | Refills: 0 | Status: SHIPPED | OUTPATIENT
Start: 2025-02-24

## 2025-02-24 RX ORDER — PROPOFOL 10 MG/ML
VIAL (ML) INTRAVENOUS
Status: DISCONTINUED | OUTPATIENT
Start: 2025-02-24 | End: 2025-02-24

## 2025-02-24 RX ORDER — MIDAZOLAM HYDROCHLORIDE 1 MG/ML
INJECTION INTRAMUSCULAR; INTRAVENOUS
Status: DISCONTINUED | OUTPATIENT
Start: 2025-02-24 | End: 2025-02-24

## 2025-02-24 RX ORDER — LIDOCAINE HYDROCHLORIDE 20 MG/ML
INJECTION INTRAVENOUS
Status: DISCONTINUED | OUTPATIENT
Start: 2025-02-24 | End: 2025-02-24

## 2025-02-24 RX ORDER — SODIUM CHLORIDE, SODIUM LACTATE, POTASSIUM CHLORIDE, CALCIUM CHLORIDE 600; 310; 30; 20 MG/100ML; MG/100ML; MG/100ML; MG/100ML
125 INJECTION, SOLUTION INTRAVENOUS CONTINUOUS
Status: DISCONTINUED | OUTPATIENT
Start: 2025-02-24 | End: 2025-02-24 | Stop reason: HOSPADM

## 2025-02-24 RX ORDER — FENTANYL CITRATE 50 UG/ML
INJECTION, SOLUTION INTRAMUSCULAR; INTRAVENOUS
Status: DISCONTINUED | OUTPATIENT
Start: 2025-02-24 | End: 2025-02-24

## 2025-02-24 RX ORDER — HYDROMORPHONE HYDROCHLORIDE 2 MG/ML
0.5 INJECTION, SOLUTION INTRAMUSCULAR; INTRAVENOUS; SUBCUTANEOUS EVERY 5 MIN PRN
Status: DISCONTINUED | OUTPATIENT
Start: 2025-02-24 | End: 2025-02-24 | Stop reason: HOSPADM

## 2025-02-24 RX ORDER — OXYCODONE HYDROCHLORIDE 5 MG/1
5 TABLET ORAL ONCE AS NEEDED
Status: COMPLETED | OUTPATIENT
Start: 2025-02-24 | End: 2025-02-24

## 2025-02-24 RX ORDER — ACETAMINOPHEN 10 MG/ML
INJECTION, SOLUTION INTRAVENOUS
Status: DISCONTINUED | OUTPATIENT
Start: 2025-02-24 | End: 2025-02-24

## 2025-02-24 RX ORDER — CEFAZOLIN 2 G/1
2 INJECTION, POWDER, FOR SOLUTION INTRAMUSCULAR; INTRAVENOUS
Status: COMPLETED | OUTPATIENT
Start: 2025-02-24 | End: 2025-02-24

## 2025-02-24 RX ORDER — DEXAMETHASONE SODIUM PHOSPHATE 4 MG/ML
INJECTION, SOLUTION INTRA-ARTICULAR; INTRALESIONAL; INTRAMUSCULAR; INTRAVENOUS; SOFT TISSUE
Status: DISCONTINUED | OUTPATIENT
Start: 2025-02-24 | End: 2025-02-24

## 2025-02-24 RX ORDER — GLUCAGON 1 MG
1 KIT INJECTION
Status: DISCONTINUED | OUTPATIENT
Start: 2025-02-24 | End: 2025-02-24 | Stop reason: HOSPADM

## 2025-02-24 RX ORDER — ONDANSETRON HYDROCHLORIDE 2 MG/ML
INJECTION, SOLUTION INTRAVENOUS
Status: DISCONTINUED | OUTPATIENT
Start: 2025-02-24 | End: 2025-02-24

## 2025-02-24 RX ORDER — SODIUM CHLORIDE 9 MG/ML
INJECTION, SOLUTION INTRAVENOUS CONTINUOUS
Status: DISCONTINUED | OUTPATIENT
Start: 2025-02-24 | End: 2025-02-24 | Stop reason: HOSPADM

## 2025-02-24 RX ORDER — BUPIVACAINE HYDROCHLORIDE 5 MG/ML
INJECTION, SOLUTION EPIDURAL; INTRACAUDAL
Status: DISCONTINUED | OUTPATIENT
Start: 2025-02-24 | End: 2025-02-24 | Stop reason: HOSPADM

## 2025-02-24 RX ORDER — LIDOCAINE HYDROCHLORIDE 10 MG/ML
1 INJECTION, SOLUTION EPIDURAL; INFILTRATION; INTRACAUDAL; PERINEURAL ONCE
Status: DISCONTINUED | OUTPATIENT
Start: 2025-02-24 | End: 2025-02-24 | Stop reason: HOSPADM

## 2025-02-24 RX ADMIN — ROCURONIUM BROMIDE 20 MG: 10 INJECTION, SOLUTION INTRAVENOUS at 12:02

## 2025-02-24 RX ADMIN — PROPOFOL 200 MG: 10 INJECTION, EMULSION INTRAVENOUS at 12:02

## 2025-02-24 RX ADMIN — HYDROMORPHONE HYDROCHLORIDE 0.5 MG: 2 INJECTION INTRAMUSCULAR; INTRAVENOUS; SUBCUTANEOUS at 01:02

## 2025-02-24 RX ADMIN — DEXAMETHASONE SODIUM PHOSPHATE 8 MG: 4 INJECTION, SOLUTION INTRAMUSCULAR; INTRAVENOUS at 12:02

## 2025-02-24 RX ADMIN — OXYCODONE HYDROCHLORIDE 5 MG: 5 TABLET ORAL at 01:02

## 2025-02-24 RX ADMIN — ROCURONIUM BROMIDE 30 MG: 10 INJECTION, SOLUTION INTRAVENOUS at 12:02

## 2025-02-24 RX ADMIN — CEFAZOLIN 2 G: 2 INJECTION, POWDER, FOR SOLUTION INTRAMUSCULAR; INTRAVENOUS at 12:02

## 2025-02-24 RX ADMIN — SODIUM CHLORIDE, POTASSIUM CHLORIDE, SODIUM LACTATE AND CALCIUM CHLORIDE: 600; 310; 30; 20 INJECTION, SOLUTION INTRAVENOUS at 12:02

## 2025-02-24 RX ADMIN — LIDOCAINE HYDROCHLORIDE 50 MG: 20 INJECTION, SOLUTION INTRAVENOUS at 12:02

## 2025-02-24 RX ADMIN — MIDAZOLAM HYDROCHLORIDE 2 MG: 1 INJECTION, SOLUTION INTRAMUSCULAR; INTRAVENOUS at 12:02

## 2025-02-24 RX ADMIN — SUGAMMADEX 200 MG: 100 INJECTION, SOLUTION INTRAVENOUS at 01:02

## 2025-02-24 RX ADMIN — ONDANSETRON 4 MG: 2 INJECTION INTRAMUSCULAR; INTRAVENOUS at 12:02

## 2025-02-24 RX ADMIN — FENTANYL CITRATE 100 MCG: 50 INJECTION INTRAMUSCULAR; INTRAVENOUS at 12:02

## 2025-02-24 RX ADMIN — ACETAMINOPHEN 1000 MG: 10 INJECTION INTRAVENOUS at 12:02

## 2025-02-24 RX ADMIN — FENTANYL CITRATE 100 MCG: 50 INJECTION INTRAMUSCULAR; INTRAVENOUS at 01:02

## 2025-02-24 NOTE — TRANSFER OF CARE
Anesthesia Transfer of Care Note    Patient: Storm Hinds    Procedure(s) Performed: Procedure(s) (LRB):  HEMORRHOIDECTOMY (N/A)    Patient location: PACU    Anesthesia Type: general    Transport from OR: Transported from OR on room air with adequate spontaneous ventilation    Post pain: adequate analgesia    Post assessment: no apparent anesthetic complications and tolerated procedure well    Post vital signs: stable    Level of consciousness: awake, alert and oriented    Nausea/Vomiting: no nausea/vomiting    Complications: none    Transfer of care protocol was followed    Last vitals: Visit Vitals  /70   Pulse 79   Temp 36.6 °C (97.9 °F)   Resp 14   Ht 6' (1.829 m)   Wt 80.7 kg (178 lb)   SpO2 96%   BMI 24.14 kg/m²

## 2025-02-24 NOTE — ANESTHESIA POSTPROCEDURE EVALUATION
Anesthesia Post Evaluation    Patient: Storm Hinds    Procedure(s) Performed: Procedure(s) (LRB):  HEMORRHOIDECTOMY (N/A)    Final Anesthesia Type: general      Patient location during evaluation: PACU  Patient participation: Yes- Able to Participate  Level of consciousness: awake and alert and oriented  Post-procedure vital signs: reviewed and stable  Pain management: adequate  Airway patency: patent    PONV status at discharge: No PONV  Anesthetic complications: no      Cardiovascular status: blood pressure returned to baseline and hemodynamically stable  Respiratory status: spontaneous ventilation and room air  Hydration status: euvolemic  Follow-up not needed.            Vitals Value Taken Time   /65 02/24/25 13:37   Temp 97 02/24/25 13:39   Pulse 78 02/24/25 13:39   Resp 0 02/24/25 13:39   SpO2 97 % 02/24/25 13:39   Vitals shown include unfiled device data.      No case tracking events are documented in the log.      Pain/Reese Score: Reese Score: 9 (2/24/2025  1:31 PM)

## 2025-02-24 NOTE — INTERVAL H&P NOTE
"Requested Prescriptions   Pending Prescriptions Disp Refills     lisinopril (PRINIVIL/ZESTRIL) 5 MG tablet [Pharmacy Med Name: LISINOPRIL 5 MG TABLET] 90 tablet 1     Sig: TAKE 1 TABLET BY MOUTH EVERY DAY       ACE Inhibitors (Including Combos) Protocol Passed - 8/7/2019 11:50 AM        Passed - Blood pressure under 140/90 in past 12 months     BP Readings from Last 3 Encounters:   10/24/18 124/72   10/08/18 146/86   10/03/18 (!) 154/92                 Passed - Recent (12 mo) or future (30 days) visit within the authorizing provider's specialty     Patient had office visit in the last 12 months or has a visit in the next 30 days with authorizing provider or within the authorizing provider's specialty.  See \"Patient Info\" tab in inbasket, or \"Choose Columns\" in Meds & Orders section of the refill encounter.              Passed - Medication is active on med list        Passed - Patient is age 18 or older        Passed - No active pregnancy on record        Passed - Normal serum creatinine on file in past 12 months     Recent Labs   Lab Test 10/24/18  1037   CR 0.88             Passed - Normal serum potassium on file in past 12 months     Recent Labs   Lab Test 10/24/18  1037   POTASSIUM 4.0             Passed - No positive pregnancy test within past 12 months        Last Written Prescription Date:  10/24/18  Last Fill Quantity: 90,  # refills: 3   Last office visit: 10/24/2018 with prescribing provider:  Birgit   Future Office Visit:   Next 5 appointments (look out 90 days)    Aug 13, 2019 12:40 PM CDT  PHYSICAL with Neno Genao MD  [facility] ([facility]) [address] [address] [phone]           " The patient has been examined and the H&P has been reviewed:    I concur with the findings and no changes have occurred since H&P was written.    Surgery risks, benefits and alternative options discussed and understood by patient/family.          There are no hospital problems to display for this patient.

## 2025-02-24 NOTE — DISCHARGE SUMMARY
[facility] Surgery  Discharge Note  Short Stay    Procedure(s) (LRB):  HEMORRHOIDECTOMY (N/A)      OUTCOME: Patient tolerated treatment/procedure well without complication and is now ready for discharge.    DISPOSITION: Home or Self Care    FINAL DIAGNOSIS:  Grade 3 internal hemorrhoids with an external component    FOLLOWUP: In clinic in 2 weeks    DISCHARGE INSTRUCTIONS:  No discharge procedures on file.     TIME SPENT ON DISCHARGE:  10 minutes

## 2025-02-24 NOTE — OP NOTE
Patient: Storm Hinds     Date of Procedure: 2/24/2025    Procedure:  Scalpel hemorrhoidectomy    Surgeon: Hector Batista MD    Assistant: None    Pre-op Diagnosis: Grade III hemorrhoids [K64.2]     Post-op Diagnosis: Grade III hemorrhoids [K64.2]    Procedure in Detail:  After informed consent was obtained, consent form signed, and questions answered, the surgical site was identified and marked appropriately.  The patient was then taken to the operating room where general endotracheal anesthesia was induced. Prophylactic IV antibiotics were administered.  The patient was positioned and the surgical field was prepped and draped in the usual sterile fashion. A thorough time-out procedure was performed with the surgical team.    Digital rectal examination was carried out of the anal canal.  Lax sphincter tone was noted.  That has a very prominent internal prolapsing hemorrhoid at the 7 o'clock position with a large external component.  This was grasped with a Dacia and pulled up and using a harmonic scalpel the hemorrhoid bed was taken down from the external hemorrhoids in the internal hemorrhoidal pillar.  Sphincter muscle was spared.  Mucosal closure was carried out using a running locking 3-0 chromic suture.  External drainage pore was left open.  Next the 2nd large hemorrhoidal component at the 1 o'clock position was identified and excised in the similar fashion.  Vascular bed was oversewn with a running locking 3-0 chromic suture mucosal.  Hemostasis was noted sterile dressings were applied patient was brought to the recovery room extubated in satisfactory condition.    Dressings were applied and the patient was awakened and transferred to the recovery room in stable condition. There were no immediate complications.    Specimen:  Passed off the table and sent for pathologic evaluation    EBL:  Less than 5 cc    Complications: None    This note was created using [employer] direct voice recognition software. Note  may have occasional typographical errors that may not have been identified and edited despite initial review prior to signing.

## 2025-02-24 NOTE — ANESTHESIA PROCEDURE NOTES
Intubation    Date/Time: 2/24/2025 12:41 PM    Performed by: Daniel Chan CRNA  Authorized by: Fortino Jimenez MD    Intubation:     Induction:  Intravenous    Intubated:  Postinduction    Mask Ventilation:  Easy mask    Attempts:  1    Attempted By:  CRNA    Method of Intubation:  Video laryngoscopy    Blade:  Bull 3    Laryngeal View Grade: Grade I - full view of cords      Difficult Airway Encountered?: No      Complications:  None    Airway Device:  Oral endotracheal tube    Airway Device Size:  7.0    Style/Cuff Inflation:  Cuffed    Tube secured:  22    Secured at:  The teeth    Placement Verified By:  Capnometry    Complicating Factors:  None    Findings Post-Intubation:  BS equal bilateral

## 2025-02-26 ENCOUNTER — PATIENT MESSAGE (OUTPATIENT)
Facility: CLINIC | Age: 45
End: 2025-02-26
Payer: COMMERCIAL

## 2025-02-26 LAB
FINAL PATHOLOGIC DIAGNOSIS: NORMAL
GROSS: NORMAL
Lab: NORMAL

## 2025-02-28 ENCOUNTER — OFFICE VISIT (OUTPATIENT)
Dept: SURGERY | Facility: CLINIC | Age: 45
End: 2025-02-28
Payer: COMMERCIAL

## 2025-02-28 ENCOUNTER — TELEPHONE (OUTPATIENT)
Dept: SURGERY | Facility: CLINIC | Age: 45
End: 2025-02-28

## 2025-02-28 VITALS
WEIGHT: 177.94 LBS | HEIGHT: 72 IN | BODY MASS INDEX: 24.1 KG/M2 | HEART RATE: 93 BPM | SYSTOLIC BLOOD PRESSURE: 136 MMHG | DIASTOLIC BLOOD PRESSURE: 71 MMHG

## 2025-02-28 DIAGNOSIS — K64.2 GRADE III HEMORRHOIDS: Primary | ICD-10-CM

## 2025-02-28 PROCEDURE — 99999 PR PBB SHADOW E&M-EST. PATIENT-LVL III: CPT | Mod: PBBFAC,,, | Performed by: SPECIALIST

## 2025-02-28 RX ORDER — OXYCODONE AND ACETAMINOPHEN 10; 325 MG/1; MG/1
1 TABLET ORAL EVERY 4 HOURS PRN
Qty: 30 TABLET | Refills: 0 | Status: SHIPPED | OUTPATIENT
Start: 2025-02-28

## 2025-02-28 NOTE — TELEPHONE ENCOUNTER
"----- Message from Margarita sent at 2/28/2025  2:15 PM CST -----  Contact: pt wife Evelyn leon  Type: Needs Medical Advice Who Called: pt abebe Leon Best Call Back Number:[phone] Information: Requesting a call back regarding  They need to know where Rx were sent. Chart states " class print" and they did not receive rx at visit Please Advise- Thank you  "

## 2025-02-28 NOTE — PROGRESS NOTES
Patient is a 44 y.o. male who presents for postoperative evaluation following  4 days out from a harmonic scalpel hemorrhoidectomy.  Patient with complaints of significant pain and discomfort    Chief Complaint   Patient presents with    Pain        Vitals:    02/28/25 1138   BP: 136/71   Pulse: 93   Weight: 80.7 kg (177 lb 14.6 oz)   Height: 6' (1.829 m)        Subjective      Physical Exam     Incision anal canal looks fine some mild swelling around the hemorrhoidectomy site at the 7 o'clock position.  No evidence of thrombosis at this time    Drains none    Pathology consistent with a hemorrhoids    Assessment & Plan     Healing appropriately.  With complaints of discomfort  Recommendations increased pain medication.  Lidocaine cream as needed.  Continue fiber and encouraged hydration with stool softeners    No orders of the defined types were placed in this encounter.       Follow-up in 2 weeks    Hector Batista MD  General Surgery  [address]   [place] [address]      Patient instructed that best way to communicate with my office staff is for patient to get on the [facility] patient portal to expedite communication and communication issues that may occur.  Patient was given instructions on how to get on the portal.  I encouraged patient to obtain portal access as well.  Ultimately it is up to the patient to obtain access.  Patient voiced understanding.

## 2025-02-28 NOTE — TELEPHONE ENCOUNTER
Writer spoke to pt's wife, wife stated she did not receive any prescriptions for the pt when they left the office. After investigating the chart, rx was sent to print, provider not in clinic. Per Dr. Batista, informed pt's wife provider will not be able to send in a rx until Monday 03/03/2025. Pt can go to ER or take over the counter medication until provider is back in clinic. Pt's wife expressed understanding and stated they would wait until provider could prescribe new rx.

## 2025-03-03 ENCOUNTER — TELEPHONE (OUTPATIENT)
Dept: SURGERY | Facility: CLINIC | Age: 45
End: 2025-03-03
Payer: COMMERCIAL

## 2025-03-03 DIAGNOSIS — K64.2 GRADE III HEMORRHOIDS: ICD-10-CM

## 2025-03-03 RX ORDER — OXYCODONE AND ACETAMINOPHEN 10; 325 MG/1; MG/1
1 TABLET ORAL EVERY 4 HOURS PRN
Qty: 30 TABLET | Refills: 0 | Status: SHIPPED | OUTPATIENT
Start: 2025-03-03

## 2025-03-03 NOTE — TELEPHONE ENCOUNTER
Writer spoke to pt, informed pt that after thoroughly going through all the paperwork, there was not a number listed on the paperwork to fax it, but that it had been sent back to them through email for them to upload into pt's work FMLA. Pt informed to call us if the pt needed anything else. Pt expressed understanding.

## 2025-03-03 NOTE — TELEPHONE ENCOUNTER
Writer spoke to pt, pt stated his prescriptions were filled and ready, Pt stated he was having multiple bowel movements daily, they looked on the normal, no diarrhea or hard stool. Pt stated he did have to wear a pad but bleeding was minimal and no strange discharges. Pt stated he was not running any fevers but pain was the worse pain he's ever felt. Pt offered to be seen this Thursday 03/06/2025. Pt declined, stating he didn't think that was necessary but we went over his post op appt date/time. Pt informed to call us if he needed anything. Pt expressed understanding.

## 2025-03-06 DIAGNOSIS — M25.561 BILATERAL CHRONIC KNEE PAIN: ICD-10-CM

## 2025-03-06 DIAGNOSIS — G89.29 BILATERAL CHRONIC KNEE PAIN: ICD-10-CM

## 2025-03-06 DIAGNOSIS — M25.562 BILATERAL CHRONIC KNEE PAIN: ICD-10-CM

## 2025-03-06 RX ORDER — DICLOFENAC SODIUM 75 MG/1
75 TABLET, DELAYED RELEASE ORAL 2 TIMES DAILY PRN
Qty: 60 TABLET | Refills: 0 | Status: SHIPPED | OUTPATIENT
Start: 2025-03-06

## 2025-03-06 NOTE — TELEPHONE ENCOUNTER
Care Due:                  Date            Visit Type   Department     Provider  --------------------------------------------------------------------------------                                MYCHART                              FOLLOWUP/OF  [facility]  Last Visit: 02-      FICE VISIT   MEDICINE       DYLAN LYNN                              EP -                              PRIMARY      [facility]  Next Visit: 04-      CARE ([facility])   MEDICINE       DYLAN LYNN                                                            Last  Test          Frequency    Reason                     Performed    Due Date  --------------------------------------------------------------------------------    CBC.........  12 months..  diclofenac...............  04- 04-    Cr..........  12 months..  diclofenac...............  07- 06-    [facility] Embedded Care Due Messages. Reference number: [id number].   3/06/2025 8:02:36 AM CST

## 2025-03-06 NOTE — TELEPHONE ENCOUNTER
Refill Routing Note   Medication(s) are not appropriate for processing by [facility] for the following reason(s):        Outside of protocol    [facility] action(s):  Route     Requires labs : Yes             Appointments  past 12m or future 3m with PCP    Date Provider   Last Visit   2/17/2025 Arnaldo Vazquez MD   Next Visit   4/10/2025 Arnaldo Vazquez MD   ED visits in past 90 days: 1        Note composed:10:06 AM 03/06/2025

## 2025-03-11 ENCOUNTER — OFFICE VISIT (OUTPATIENT)
Facility: CLINIC | Age: 45
End: 2025-03-11
Payer: COMMERCIAL

## 2025-03-11 VITALS
BODY MASS INDEX: 24.1 KG/M2 | HEIGHT: 72 IN | DIASTOLIC BLOOD PRESSURE: 64 MMHG | WEIGHT: 177.94 LBS | SYSTOLIC BLOOD PRESSURE: 110 MMHG

## 2025-03-11 DIAGNOSIS — K64.2 GRADE III HEMORRHOIDS: Primary | ICD-10-CM

## 2025-03-11 PROCEDURE — 1159F MED LIST DOCD IN RCRD: CPT | Mod: S$GLB,,, | Performed by: SPECIALIST

## 2025-03-11 PROCEDURE — 3078F DIAST BP <80 MM HG: CPT | Mod: S$GLB,,, | Performed by: SPECIALIST

## 2025-03-11 PROCEDURE — 3074F SYST BP LT 130 MM HG: CPT | Mod: S$GLB,,, | Performed by: SPECIALIST

## 2025-03-11 PROCEDURE — 99024 POSTOP FOLLOW-UP VISIT: CPT | Mod: S$GLB,,, | Performed by: SPECIALIST

## 2025-03-11 PROCEDURE — 1160F RVW MEDS BY RX/DR IN RCRD: CPT | Mod: S$GLB,,, | Performed by: SPECIALIST

## 2025-03-11 RX ORDER — LIDOCAINE HCL 4 G/100G
CREAM TOPICAL 3 TIMES DAILY
COMMUNITY
Start: 2025-03-03

## 2025-03-11 NOTE — PROGRESS NOTES
Patient is a 44 y.o. male who presents for postoperative evaluation following  hemorrhoidectomy    No chief complaint on file.       Vitals:    03/11/25 1417   BP: 110/64   BP Location: Left arm   Patient Position: Sitting   Weight: 80.7 kg (177 lb 14.6 oz)   Height: 6' (1.829 m)        Subjective      Physical Exam     Incision healing nicely    Drains  none    Pathology discussed    Assessment & Plan     Healing nicely no longer having discomfort in the longer requiring pain medicines  Recommendations continue with fiber supplementation and hydration    No orders of the defined types were placed in this encounter.       Follow-up p.rcoleman.    Hector Batista MD  General Surgery  [address]   [place] [address]      Patient instructed that best way to communicate with my office staff is for patient to get on the [facility] patient portal to expedite communication and communication issues that may occur.  Patient was given instructions on how to get on the portal.  I encouraged patient to obtain portal access as well.  Ultimately it is up to the patient to obtain access.  Patient voiced understanding.

## 2025-04-01 ENCOUNTER — OFFICE VISIT (OUTPATIENT)
Facility: CLINIC | Age: 45
End: 2025-04-01
Payer: COMMERCIAL

## 2025-04-01 VITALS — SYSTOLIC BLOOD PRESSURE: 106 MMHG | DIASTOLIC BLOOD PRESSURE: 62 MMHG

## 2025-04-01 DIAGNOSIS — K64.2 GRADE III HEMORRHOIDS: Primary | ICD-10-CM

## 2025-04-01 PROCEDURE — 1159F MED LIST DOCD IN RCRD: CPT | Mod: S$GLB,,, | Performed by: SPECIALIST

## 2025-04-01 PROCEDURE — 99024 POSTOP FOLLOW-UP VISIT: CPT | Mod: S$GLB,,, | Performed by: SPECIALIST

## 2025-04-01 PROCEDURE — 1160F RVW MEDS BY RX/DR IN RCRD: CPT | Mod: S$GLB,,, | Performed by: SPECIALIST

## 2025-04-01 NOTE — PROGRESS NOTES
Patient is a 45 y.o. male who presents for postoperative evaluation following  hemorrhoidectomy    No chief complaint on file.     Patient reports some intermittent bleeding  There were no vitals filed for this visit.     Subjective      Physical Exam     Incision reports healing    Drains none    Pathology previously discussed    Assessment & Plan     Healing adequately following hemorrhoidectomy  Recommendations continue fiber supplementation stool softeners in hydration    No orders of the defined types were placed in this encounter.       Follow-up pdanita Batista MD  General Surgery  [address]   [place] [address]      Patient instructed that best way to communicate with my office staff is for patient to get on the [facility] patient portal to expedite communication and communication issues that may occur.  Patient was given instructions on how to get on the portal.  I encouraged patient to obtain portal access as well.  Ultimately it is up to the patient to obtain access.  Patient voiced understanding.

## 2025-04-01 NOTE — LETTER
April 1, 2025      [facility] - General Surgery  [address]  [place] [address]  Phone: [phone]  Fax: [phone]       Patient: Storm Hinds   YOB: 1980  Date of Visit: 04/01/2025    To Whom It May Concern:    Sudheer Hinds  was at [facility] on 04/01/2025. The patient may return to work on 04/14/2025 with no restrictions. If you have any questions or concerns, or if I can be of further assistance, please do not hesitate to contact me.    Sincerely,    MITCHELL Salmon  Office of Dr. Hector Batista MD

## 2025-04-07 ENCOUNTER — OFFICE VISIT (OUTPATIENT)
Dept: FAMILY MEDICINE | Facility: CLINIC | Age: 45
End: 2025-04-07
Payer: COMMERCIAL

## 2025-04-07 VITALS
HEIGHT: 72 IN | WEIGHT: 184 LBS | BODY MASS INDEX: 24.92 KG/M2 | OXYGEN SATURATION: 99 % | SYSTOLIC BLOOD PRESSURE: 138 MMHG | DIASTOLIC BLOOD PRESSURE: 84 MMHG | HEART RATE: 92 BPM

## 2025-04-07 DIAGNOSIS — M25.461 KNEE EFFUSION, RIGHT: ICD-10-CM

## 2025-04-07 DIAGNOSIS — M25.50 ARTHRALGIA OF MULTIPLE JOINTS: ICD-10-CM

## 2025-04-07 DIAGNOSIS — G24.9 DYSTONIA: Primary | ICD-10-CM

## 2025-04-07 DIAGNOSIS — M10.9 GOUTY ARTHRITIS: ICD-10-CM

## 2025-04-07 DIAGNOSIS — R25.9 ABNORMAL INVOLUNTARY MOVEMENTS: ICD-10-CM

## 2025-04-07 RX ORDER — PREDNISONE 20 MG/1
20 TABLET ORAL 2 TIMES DAILY
Qty: 10 TABLET | Refills: 0 | Status: SHIPPED | OUTPATIENT
Start: 2025-04-07 | End: 2025-04-12

## 2025-04-07 RX ORDER — COLCHICINE 0.6 MG/1
TABLET ORAL
Qty: 30 TABLET | Refills: 2 | Status: SHIPPED | OUTPATIENT
Start: 2025-04-07

## 2025-04-07 NOTE — PROGRESS NOTES
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  Manages well with control of anxiety.  Here today reporting knee issues.    Recently evaluated by general surgery for hemorrhoidal issues    Today, reporting acute swelling of R knee.  Notable pain, some redness.  No injury recently, no risk or cause of infection.  Did have shrimp recently.    ROS: as above    Vitals:    04/07/25 1039   BP: 138/84   BP Location: Left arm   Patient Position: Sitting   Pulse: 92   SpO2: 99%   Weight: 83.5 kg (184 lb)   Height: 6' (1.829 m)      Body mass index is 24.95 kg/m².      General:  AOx3, well nourished and developed in no acute distress  Eyes:  PERRLA, EOMI, vision intact grossly  ENT:  normal hearing, moist oral mucosa  Neck:  trachea midline with no masses or thyromegaly  Heart:  RRR, no murmurs.  No edema noted, extremities warm and well perfused  Lungs:  clear to auscultation bilaterally with symmetric chest movement  Abdomen:  Soft, nontender, nondistended.  Normal bowel sounds  Musculoskeletal:  Normal gait.  Normal posture.  Normal muscular development with no joint swelling, but some notable swelling and scant erythema to R knee  Neurological:  CN II-XII grossly intact. Symmetric strength and sensation  Psych:  Normal mood and affect.  Able to demonstrate good judgement and personal insight.      Assessment/Plan:    1. Dystonia  Overview:  Onset of dystonic posture in LLE 09/16/19.  Initial partial response to trihexyphenidyl, but did not tolerate (side effects of confusion, speech difficulty).  Responds to carbidopa-levodopa, concern for dopamine-responsive dystonia versus atypical parkinsonism.  Will be important to treatment plan to obtain FIONA scan, likely to .      2. Abnormal involuntary movements    3. Arthralgia of multiple joints    4.  Gouty arthritis  -     Ambulatory referral/consult to Orthopedics; Future; Expected date: 04/14/2025    Other orders  -     colchicine (COLCRYS) 0.6 mg tablet; Day 1:  Take 2 tabs, then 1 tab 1 hour later.  Day 2:  Take 2 tabs daily until symptoms resolve  Dispense: 30 tablet; Refill: 2  -     predniSONE (DELTASONE) 20 MG tablet; Take 1 tablet (20 mg total) by mouth 2 (two) times daily. for 5 days  Dispense: 10 tablet; Refill: 0         Stable  Stable as above  Stable historically  I have lower indication for septic joint, given limited risk factors/cause of this.  Clinical scenario seems more likely to be gout.  Start colchicine, steroids, and ice.  Given swelling however, will send to ortho for aspiration.    Visit today included increased complexity associated with the care of the episodic problem gout, dystonia addressed and managing the longitudinal care of the patient due to the serious and/or complex managed problem(s) gout, dystonia.

## 2025-04-07 NOTE — PATIENT INSTRUCTIONS
Start prednisone and colchicine to improve pain and swelling to knee  Ice knee aggressively    If not better in 1-2 days, follow up with orthopedist Dr. De Leon to make sure no infection in joint

## 2025-04-11 ENCOUNTER — OFFICE VISIT (OUTPATIENT)
Dept: SURGERY | Facility: CLINIC | Age: 45
End: 2025-04-11
Payer: COMMERCIAL

## 2025-04-11 VITALS
SYSTOLIC BLOOD PRESSURE: 122 MMHG | WEIGHT: 184.06 LBS | DIASTOLIC BLOOD PRESSURE: 71 MMHG | HEIGHT: 72 IN | HEART RATE: 70 BPM | BODY MASS INDEX: 24.93 KG/M2

## 2025-04-11 DIAGNOSIS — K64.2 GRADE III HEMORRHOIDS: Primary | ICD-10-CM

## 2025-04-11 PROCEDURE — 99999 PR PBB SHADOW E&M-EST. PATIENT-LVL II: CPT | Mod: PBBFAC,,, | Performed by: SPECIALIST

## 2025-04-11 NOTE — PROGRESS NOTES
Patient is a 45 y.o. male who presents for postoperative evaluation following  hemorrhoidectomy    No chief complaint on file.       There were no vitals filed for this visit.     Subjective      Physical Exam     Incision completely healed    Drains none    Pathology already discussed    Assessment & Plan     Completely healed no complaints  Recommendations return to work full privilege    No orders of the defined types were placed in this encounter.       Follow-up merna Batista MD  General Surgery  [address]   [facility] [address]      Patient instructed that best way to communicate with my office staff is for patient to get on the [facility] patient portal to expedite communication and communication issues that may occur.  Patient was given instructions on how to get on the portal.  I encouraged patient to obtain portal access as well.  Ultimately it is up to the patient to obtain access.  Patient voiced understanding.

## 2025-04-19 DIAGNOSIS — M25.561 BILATERAL CHRONIC KNEE PAIN: ICD-10-CM

## 2025-04-19 DIAGNOSIS — G89.29 BILATERAL CHRONIC KNEE PAIN: ICD-10-CM

## 2025-04-19 DIAGNOSIS — M25.562 BILATERAL CHRONIC KNEE PAIN: ICD-10-CM

## 2025-04-19 NOTE — TELEPHONE ENCOUNTER
Care Due:                  Date            Visit Type   Department     Provider  --------------------------------------------------------------------------------                                EP -                              PRIMARY      [facility]  Last Visit: 04-      CARE ([facility])   MEDICINE       Arnaldo Vazquez  Next Visit: None Scheduled  None         None Found                                                            Last  Test          Frequency    Reason                     Performed    Due Date  --------------------------------------------------------------------------------    Uric Acid...  12 months..  colchicine...............  Not Found    Overdue    [employer] Embedded Care Due Messages. Reference number: [id number].   4/19/2025 5:13:44 PM CDT

## 2025-04-21 RX ORDER — DICLOFENAC SODIUM 75 MG/1
75 TABLET, DELAYED RELEASE ORAL 2 TIMES DAILY PRN
Qty: 60 TABLET | Refills: 0 | Status: SHIPPED | OUTPATIENT
Start: 2025-04-21

## 2025-04-21 NOTE — TELEPHONE ENCOUNTER
Refill Routing Note   Medication(s) are not appropriate for processing by [facility] for the following reason(s):        Outside of protocol    [facility] action(s):  Route   Requires labs : Yes             Appointments  past 12m or future 3m with PCP    Date Provider   Last Visit   4/7/2025 Arnaldo Vazquez MD   Next Visit   Visit date not found Arnaldo Vazquez MD   ED visits in past 90 days: 1        Note composed:9:04 AM 04/21/2025

## 2025-05-27 ENCOUNTER — OFFICE VISIT (OUTPATIENT)
Dept: FAMILY MEDICINE | Facility: CLINIC | Age: 45
End: 2025-05-27
Payer: COMMERCIAL

## 2025-05-27 VITALS
HEART RATE: 111 BPM | HEIGHT: 72 IN | BODY MASS INDEX: 24.4 KG/M2 | SYSTOLIC BLOOD PRESSURE: 112 MMHG | WEIGHT: 180.13 LBS | DIASTOLIC BLOOD PRESSURE: 60 MMHG | OXYGEN SATURATION: 98 %

## 2025-05-27 DIAGNOSIS — M25.562 BILATERAL CHRONIC KNEE PAIN: Primary | ICD-10-CM

## 2025-05-27 DIAGNOSIS — G89.29 BILATERAL CHRONIC KNEE PAIN: Primary | ICD-10-CM

## 2025-05-27 DIAGNOSIS — R10.9 ABDOMINAL CRAMPING: ICD-10-CM

## 2025-05-27 DIAGNOSIS — M25.561 BILATERAL CHRONIC KNEE PAIN: Primary | ICD-10-CM

## 2025-05-27 DIAGNOSIS — R11.2 NAUSEA AND VOMITING, UNSPECIFIED VOMITING TYPE: ICD-10-CM

## 2025-05-27 PROCEDURE — 3008F BODY MASS INDEX DOCD: CPT | Mod: S$GLB,,, | Performed by: STUDENT IN AN ORGANIZED HEALTH CARE EDUCATION/TRAINING PROGRAM

## 2025-05-27 PROCEDURE — 1159F MED LIST DOCD IN RCRD: CPT | Mod: S$GLB,,, | Performed by: STUDENT IN AN ORGANIZED HEALTH CARE EDUCATION/TRAINING PROGRAM

## 2025-05-27 PROCEDURE — 1160F RVW MEDS BY RX/DR IN RCRD: CPT | Mod: S$GLB,,, | Performed by: STUDENT IN AN ORGANIZED HEALTH CARE EDUCATION/TRAINING PROGRAM

## 2025-05-27 PROCEDURE — 3078F DIAST BP <80 MM HG: CPT | Mod: S$GLB,,, | Performed by: STUDENT IN AN ORGANIZED HEALTH CARE EDUCATION/TRAINING PROGRAM

## 2025-05-27 PROCEDURE — 99214 OFFICE O/P EST MOD 30 MIN: CPT | Mod: S$GLB,,, | Performed by: STUDENT IN AN ORGANIZED HEALTH CARE EDUCATION/TRAINING PROGRAM

## 2025-05-27 PROCEDURE — 3074F SYST BP LT 130 MM HG: CPT | Mod: S$GLB,,, | Performed by: STUDENT IN AN ORGANIZED HEALTH CARE EDUCATION/TRAINING PROGRAM

## 2025-05-27 PROCEDURE — G2211 COMPLEX E/M VISIT ADD ON: HCPCS | Mod: S$GLB,,, | Performed by: STUDENT IN AN ORGANIZED HEALTH CARE EDUCATION/TRAINING PROGRAM

## 2025-05-27 RX ORDER — ONDANSETRON 4 MG/1
4 TABLET, ORALLY DISINTEGRATING ORAL 2 TIMES DAILY
Qty: 30 TABLET | Refills: 0 | Status: SHIPPED | OUTPATIENT
Start: 2025-05-27

## 2025-05-27 RX ORDER — DICYCLOMINE HYDROCHLORIDE 10 MG/1
10 CAPSULE ORAL
Qty: 120 CAPSULE | Refills: 0 | Status: SHIPPED | OUTPATIENT
Start: 2025-05-27 | End: 2025-06-26

## 2025-05-27 RX ORDER — DICLOFENAC SODIUM 75 MG/1
75 TABLET, DELAYED RELEASE ORAL 2 TIMES DAILY PRN
Qty: 60 TABLET | Refills: 2 | Status: SHIPPED | OUTPATIENT
Start: 2025-05-27

## 2025-05-27 NOTE — PATIENT INSTRUCTIONS
Khang Inman,     If you are due for any health screening(s) below please notify me so we can arrange them to be ordered and scheduled. Most healthy patients at your age complete them, but you are free to accept or refuse.     If you can't do it, I'll definitely understand. If you can, I'd certainly appreciate it!    All of your core healthy metrics are met.

## 2025-05-27 NOTE — PROGRESS NOTES
[facility]  [facility] - [place], [place]    Family Medicine Office Visit    Subjective     Patient ID: Storm Hinds is a 45 y.o. male who presents to clinic today to follow up.     Medical history, surgical history, medications, allergies, and social history were reviewed and updated.     Chief Complaint: Emesis (X 1 day) and Fatigue    HPI    Had emesis this morning. Reporting nausea associated with it. No blood in emesis or coffee ground emesis. Did report some chills. No fevers. No one else with similar symptoms. He has increased his fiber and has cut back on sugar recently. Trying to eat more nuts. No diarrhea, constipation, dark stools, or blood in stool. He is not currently taking anything for his symptoms.     Has a has some chronic bilateral knee pain and takes diclofenac as needed for this.  Reported this medication works well for him.  He is requesting refill today.    Vitals:    05/27/25 1605   BP: 112/60   Pulse: (!) 111      Wt Readings from Last 3 Encounters:   05/27/25 1605 81.7 kg (180 lb 1.9 oz)   04/11/25 1129 83.5 kg (184 lb 1.4 oz)   04/07/25 1039 83.5 kg (184 lb)      Review of Systems    Review of Systems otherwise negative unless specified above.        Objective     Physical Exam  Vitals reviewed.   Constitutional:       General: He is not in acute distress.     Appearance: Normal appearance.   HENT:      Head: Normocephalic and atraumatic.      Nose: Nose normal.      Mouth/Throat:      Mouth: Mucous membranes are moist.   Cardiovascular:      Rate and Rhythm: Regular rhythm. Tachycardia present.      Heart sounds: No murmur heard.  Pulmonary:      Effort: Pulmonary effort is normal. No respiratory distress.      Breath sounds: Normal breath sounds.   Musculoskeletal:         General: Normal range of motion.   Skin:     General: Skin is warm and dry.   Neurological:      General: No focal deficit present.      Mental Status: He is alert and oriented to person, place,  and time.   Psychiatric:         Mood and Affect: Mood normal.         Behavior: Behavior normal.            Assessment and Plan     Current Outpatient Medications   Medication Instructions    baclofen (LIORESAL) 20 mg, Oral, 4 times daily    carbidopa-levodopa  mg (SINEMET)  mg per tablet 2 tablets, Oral, 3 times daily    colchicine (COLCRYS) 0.6 mg tablet Day 1:  Take 2 tabs, then 1 tab 1 hour later.  Day 2:  Take 2 tabs daily until symptoms resolve    diclofenac (VOLTAREN) 75 mg, Oral, 2 times daily PRN    dicyclomine (BENTYL) 10 mg, Oral, Before meals & nightly    gabapentin (NEURONTIN) 300 mg, Oral, 2 times daily    LORazepam (ATIVAN) 1 MG tablet TAKE ONE-HALF TABLET BY MOUTH EVERY TWELVE HOURS AS NEEDED FOR ANXIETY    ondansetron (ZOFRAN-ODT) 4 mg, Oral, 2 times daily    tobramycin sulfate 0.3% (TOBREX) 0.3 % ophthalmic solution 1 drop, Every 6 hours    traMADoL (ULTRAM) 50 mg, Oral, Every 12 hours PRN        1. Bilateral chronic knee pain  -     diclofenac (VOLTAREN) 75 MG EC tablet; Take 1 tablet (75 mg total) by mouth 2 (two) times daily as needed (Pain).  Dispense: 60 tablet; Refill: 2    2. Nausea and vomiting, unspecified vomiting type  -     ondansetron (ZOFRAN-ODT) 4 MG TbDL; Take 1 tablet (4 mg total) by mouth 2 (two) times daily.  Dispense: 30 tablet; Refill: 0    3. Abdominal cramping  -     dicyclomine (BENTYL) 10 MG capsule; Take 1 capsule (10 mg total) by mouth 4 (four) times daily before meals and nightly.  Dispense: 120 capsule; Refill: 0        Refilling his diclofenac.  Sending in symptomatic treatment for nausea, vomiting, and abdominal cramping.  Discussed that if his symptoms are ongoing for 7-10 days, would recommend completing labs for further evaluation.  Recommended that he avoid red foods, increase fluids, and eat a bland diet for the next few days to see if his symptoms improve.  Discussed that should he have fever, chills, hematemesis, bright red blood per rectum, or  wood, he should return to clinic for further evaluation.  He did have some questions regarding his Sinemet.  We will plan to get him back in with PCP to discuss this further.    Visit today included increased complexity associated with the care of the episodic problem knee pain addressed and managing the longitudinal care of the patient due to the serious and/or complex managed problem(s) knee pain.         Follow up in about 2 weeks (around 6/10/2025) for Follow up with PCP.    Questions were invited and answered. No other acute concerns at this time. Will plan to follow up as above or sooner if needed.     Sharon Orozco DO  05/27/2025 4:14 PM       This dictation has been generated using Modal Fluency Dictation. Some phonetic errors may occur. Please contact author for clarification if needed.

## 2025-05-27 NOTE — LETTER
May 27, 2025      [facility]  [address] [address]  Phone: [phone]       Patient: Storm Hinds   YOB: 1980  Date of Visit: 05/27/2025    To Whom It May Concern:    Sudheer Hinds  was at [facility] on 05/27/2025. The patient may return to work/school on 5/29/2025 with no restrictions. If you have any questions or concerns, or if I can be of further assistance, please do not hesitate to contact me.    Sincerely,    Minda Bunn MA

## 2025-06-05 ENCOUNTER — OFFICE VISIT (OUTPATIENT)
Dept: FAMILY MEDICINE | Facility: CLINIC | Age: 45
End: 2025-06-05
Payer: COMMERCIAL

## 2025-06-05 VITALS
OXYGEN SATURATION: 98 % | WEIGHT: 181.13 LBS | BODY MASS INDEX: 24.53 KG/M2 | DIASTOLIC BLOOD PRESSURE: 68 MMHG | HEIGHT: 72 IN | SYSTOLIC BLOOD PRESSURE: 102 MMHG | HEART RATE: 85 BPM

## 2025-06-05 DIAGNOSIS — G24.9 DYSTONIA: ICD-10-CM

## 2025-06-05 DIAGNOSIS — G89.29 BILATERAL CHRONIC KNEE PAIN: ICD-10-CM

## 2025-06-05 DIAGNOSIS — R25.9 ABNORMAL INVOLUNTARY MOVEMENTS: Primary | ICD-10-CM

## 2025-06-05 DIAGNOSIS — M79.10 MUSCLE PAIN: ICD-10-CM

## 2025-06-05 DIAGNOSIS — M25.561 BILATERAL CHRONIC KNEE PAIN: ICD-10-CM

## 2025-06-05 DIAGNOSIS — M25.562 BILATERAL CHRONIC KNEE PAIN: ICD-10-CM

## 2025-06-05 DIAGNOSIS — F41.9 ANXIETY: ICD-10-CM

## 2025-06-05 DIAGNOSIS — K64.2 GRADE III HEMORRHOIDS: ICD-10-CM

## 2025-06-05 PROBLEM — R41.3 MEMORY LOSS: Status: RESOLVED | Noted: 2020-07-21 | Resolved: 2025-06-05

## 2025-06-05 RX ORDER — GABAPENTIN 300 MG/1
600 CAPSULE ORAL 2 TIMES DAILY
Qty: 360 CAPSULE | Refills: 3 | Status: SHIPPED | OUTPATIENT
Start: 2025-06-05 | End: 2026-06-05

## 2025-06-05 RX ORDER — TRAMADOL HYDROCHLORIDE 50 MG/1
50 TABLET, FILM COATED ORAL EVERY 12 HOURS PRN
Qty: 30 TABLET | Refills: 1 | Status: SHIPPED | OUTPATIENT
Start: 2025-06-05

## 2025-06-05 RX ORDER — LORAZEPAM 1 MG/1
0.5 TABLET ORAL EVERY 12 HOURS PRN
Qty: 60 TABLET | Refills: 2 | Status: SHIPPED | OUTPATIENT
Start: 2025-06-05

## 2025-06-22 ENCOUNTER — PATIENT MESSAGE (OUTPATIENT)
Dept: INTERNAL MEDICINE | Facility: CLINIC | Age: 45
End: 2025-06-22
Payer: COMMERCIAL

## 2025-06-30 ENCOUNTER — PATIENT MESSAGE (OUTPATIENT)
Dept: FAMILY MEDICINE | Facility: CLINIC | Age: 45
End: 2025-06-30
Payer: COMMERCIAL

## 2025-07-01 NOTE — TELEPHONE ENCOUNTER
Kumar Sutton Silvia,  I answered the questions on this patient's PA for his Gabapentin 300 mg were he takes 2 capsules BID. Questions were answered currently pending but his insurance is [employer].  Please review patient really needs this medication.  Thank you.  Brandyn Frey LPN

## 2025-07-01 NOTE — TELEPHONE ENCOUNTER
Kumar Vazquez,  Please review this message below from this patient's wife concerning this patient's Gabapentin Rx.  The PA has been completed and it is currently pending but I did send a message to Silvia Linares RN to review because patient's insurance is [employer] and those PA must be completed in their portal.  I copied and pasted this message from spouse because she spoke with [employer] and they made a suggestion for a change in the Gabapentin Rx that could be considered.  Please review message below.  Thank you  YSABEL Velasquez (proxy for Storm Hinds)  AISHA LEONARD Staff (supporting Arnaldo Vazquez MD)14 hours ago (5:33 PM)       Hi Everett Polkwayne went to  his refill of gabapentin today and ran into a slight problem. The insurance is now requiring a prior authorization for the quantity that he is now taking. They gave us a number for that, [phone].  The other option they gave us was to change the medication to the 600mg tablets as they do not have a limit on those. Which would be easier? Thank you!

## 2025-07-08 DIAGNOSIS — G24.9 DYSTONIA: ICD-10-CM

## 2025-07-09 RX ORDER — CARBIDOPA AND LEVODOPA 25; 100 MG/1; MG/1
2 TABLET ORAL 3 TIMES DAILY
Qty: 540 TABLET | Refills: 2 | Status: SHIPPED | OUTPATIENT
Start: 2025-07-09

## 2025-07-31 DIAGNOSIS — I10 ESSENTIAL HYPERTENSION: ICD-10-CM

## 2025-08-22 ENCOUNTER — OFFICE VISIT (OUTPATIENT)
Dept: FAMILY MEDICINE | Facility: CLINIC | Age: 45
End: 2025-08-22
Payer: COMMERCIAL

## 2025-08-22 DIAGNOSIS — R07.9 CHEST PAIN, UNSPECIFIED TYPE: ICD-10-CM

## 2025-08-22 DIAGNOSIS — G24.9 DYSTONIA: Primary | ICD-10-CM

## 2025-08-22 DIAGNOSIS — G95.9 MYELOPATHY: ICD-10-CM

## 2025-08-22 RX ORDER — PANTOPRAZOLE SODIUM 40 MG/1
40 TABLET, DELAYED RELEASE ORAL DAILY
Qty: 90 TABLET | Refills: 3 | Status: SHIPPED | OUTPATIENT
Start: 2025-08-22 | End: 2026-08-22

## 2025-08-29 DIAGNOSIS — M25.562 BILATERAL CHRONIC KNEE PAIN: ICD-10-CM

## 2025-08-29 DIAGNOSIS — G89.29 BILATERAL CHRONIC KNEE PAIN: ICD-10-CM

## 2025-08-29 DIAGNOSIS — M25.561 BILATERAL CHRONIC KNEE PAIN: ICD-10-CM

## 2025-08-29 RX ORDER — DICLOFENAC SODIUM 75 MG/1
75 TABLET, DELAYED RELEASE ORAL 2 TIMES DAILY PRN
Qty: 60 TABLET | Refills: 2 | Status: SHIPPED | OUTPATIENT
Start: 2025-08-29

## 2025-09-05 ENCOUNTER — OFFICE VISIT (OUTPATIENT)
Dept: FAMILY MEDICINE | Facility: CLINIC | Age: 45
End: 2025-09-05
Payer: COMMERCIAL

## 2025-09-05 VITALS
WEIGHT: 181.19 LBS | BODY MASS INDEX: 24.58 KG/M2 | HEART RATE: 67 BPM | SYSTOLIC BLOOD PRESSURE: 105 MMHG | DIASTOLIC BLOOD PRESSURE: 60 MMHG | OXYGEN SATURATION: 98 %

## 2025-09-05 DIAGNOSIS — M25.50 ARTHRALGIA OF MULTIPLE JOINTS: Primary | ICD-10-CM

## 2025-09-05 DIAGNOSIS — G24.9 DYSTONIA: ICD-10-CM

## 2025-09-05 DIAGNOSIS — R07.9 CHEST PAIN, UNSPECIFIED TYPE: ICD-10-CM

## 2025-09-05 DIAGNOSIS — G95.9 MYELOPATHY: ICD-10-CM

## 2025-09-05 PROBLEM — R68.2 DRY MOUTH: Status: RESOLVED | Noted: 2020-01-31 | Resolved: 2025-09-05

## 2025-09-05 RX ORDER — LORAZEPAM 1 MG/1
0.5 TABLET ORAL EVERY 12 HOURS PRN
Qty: 60 TABLET | Refills: 2 | Status: SHIPPED | OUTPATIENT
Start: 2025-09-05

## 2025-09-05 RX ORDER — GABAPENTIN 600 MG/1
600 TABLET ORAL 2 TIMES DAILY
Qty: 180 TABLET | Refills: 3 | Status: SHIPPED | OUTPATIENT
Start: 2025-09-05 | End: 2026-09-05

## (undated) DEVICE — TRAY SKIN SCRUB WET 4 COMPART

## (undated) DEVICE — SPONGE GAUZE 16PLY 4X4

## (undated) DEVICE — NDL HYPO SAFETY 25GX1.5IN

## (undated) DEVICE — PENCIL SMK EVAC CONNECTOR 10FT

## (undated) DEVICE — SHEARS HARMONIC CRVD 9 CM

## (undated) DEVICE — SYR 12CC CNTRL L-L NO NDL

## (undated) DEVICE — CANISTER SUCTION RIGID 3000CC

## (undated) DEVICE — DRAPE UINDERBUT GRAD PCH

## (undated) DEVICE — Device

## (undated) DEVICE — LEGGING CLEAR POLY 2/PACK

## (undated) DEVICE — GLOVE BIOGEL ECLIPSE SZ 7.5

## (undated) DEVICE — GLOVE SENSICARE PI GRN 7

## (undated) DEVICE — GLOVE SENSICARE PI SURG 7

## (undated) DEVICE — ELECTRODE REM PLYHSV RETURN 9

## (undated) DEVICE — JELLY SURGILUBE LUBE TUBE 2OZ

## (undated) DEVICE — PAD ABDOMINAL STERILE 8X10IN

## (undated) DEVICE — GLOVE SENSICARE PI SURG 6.5

## (undated) DEVICE — GOWN POLY REINF BRTH SLV LG

## (undated) DEVICE — SUT CHROMIC 3-0 SH 27IN GUT